# Patient Record
Sex: MALE | Race: BLACK OR AFRICAN AMERICAN | NOT HISPANIC OR LATINO | Employment: UNEMPLOYED | URBAN - NONMETROPOLITAN AREA
[De-identification: names, ages, dates, MRNs, and addresses within clinical notes are randomized per-mention and may not be internally consistent; named-entity substitution may affect disease eponyms.]

---

## 2017-07-17 ENCOUNTER — APPOINTMENT (OUTPATIENT)
Dept: CT IMAGING | Facility: HOSPITAL | Age: 42
End: 2017-07-17

## 2017-07-17 ENCOUNTER — HOSPITAL ENCOUNTER (EMERGENCY)
Facility: HOSPITAL | Age: 42
Discharge: HOME | End: 2017-07-17
Attending: EMERGENCY MEDICINE | Admitting: EMERGENCY MEDICINE

## 2017-07-17 VITALS
WEIGHT: 285 LBS | OXYGEN SATURATION: 100 % | TEMPERATURE: 97.4 F | HEIGHT: 74 IN | SYSTOLIC BLOOD PRESSURE: 127 MMHG | DIASTOLIC BLOOD PRESSURE: 89 MMHG | HEART RATE: 88 BPM | RESPIRATION RATE: 20 BRPM | BODY MASS INDEX: 36.57 KG/M2

## 2017-07-17 DIAGNOSIS — F10.920 ALCOHOL INTOXICATION, UNCOMPLICATED (HCC): Primary | ICD-10-CM

## 2017-07-17 LAB
ALBUMIN SERPL-MCNC: 4.6 G/DL (ref 3.5–5)
ALBUMIN/GLOB SERPL: 1.4 G/DL (ref 1–2)
ALP SERPL-CCNC: 81 U/L (ref 38–126)
ALT SERPL W P-5'-P-CCNC: 29 U/L (ref 13–69)
AMPHET+METHAMPHET UR QL: NEGATIVE
AMPHETAMINES UR QL: NEGATIVE
ANION GAP SERPL CALCULATED.3IONS-SCNC: 19.3 MMOL/L
AST SERPL-CCNC: 34 U/L (ref 15–46)
BACTERIA UR QL AUTO: ABNORMAL /HPF
BARBITURATES UR QL SCN: NEGATIVE
BASOPHILS # BLD AUTO: 0.09 10*3/MM3 (ref 0–0.2)
BASOPHILS NFR BLD AUTO: 1.8 % (ref 0–2.5)
BENZODIAZ UR QL SCN: NEGATIVE
BILIRUB SERPL-MCNC: 0.8 MG/DL (ref 0.2–1.3)
BILIRUB UR QL STRIP: NEGATIVE
BUN BLD-MCNC: 17 MG/DL (ref 7–20)
BUN/CREAT SERPL: 15.5 (ref 6.3–21.9)
BUPRENORPHINE SERPL-MCNC: NEGATIVE NG/ML
CALCIUM SPEC-SCNC: 9.3 MG/DL (ref 8.4–10.2)
CANNABINOIDS SERPL QL: NEGATIVE
CHLORIDE SERPL-SCNC: 108 MMOL/L (ref 98–107)
CLARITY UR: CLEAR
CO2 SERPL-SCNC: 25 MMOL/L (ref 26–30)
COCAINE UR QL: NEGATIVE
COLOR UR: YELLOW
CREAT BLD-MCNC: 1.1 MG/DL (ref 0.6–1.3)
DEPRECATED RDW RBC AUTO: 45.2 FL (ref 37–54)
EOSINOPHIL # BLD AUTO: 0.1 10*3/MM3 (ref 0–0.7)
EOSINOPHIL NFR BLD AUTO: 2 % (ref 0–7)
ERYTHROCYTE [DISTWIDTH] IN BLOOD BY AUTOMATED COUNT: 15.1 % (ref 11.5–14.5)
ETHANOL BLD-MCNC: 321 MG/DL
ETHANOL UR QL: 0.32 %
GFR SERPL CREATININE-BSD FRML MDRD: 89 ML/MIN/1.73
GLOBULIN UR ELPH-MCNC: 3.2 GM/DL
GLUCOSE BLD-MCNC: 111 MG/DL (ref 74–98)
GLUCOSE UR STRIP-MCNC: NEGATIVE MG/DL
HCT VFR BLD AUTO: 44.8 % (ref 42–52)
HGB BLD-MCNC: 14.6 G/DL (ref 14–18)
HGB UR QL STRIP.AUTO: NEGATIVE
HYALINE CASTS UR QL AUTO: ABNORMAL /LPF
IMM GRANULOCYTES # BLD: 0.01 10*3/MM3 (ref 0–0.06)
IMM GRANULOCYTES NFR BLD: 0.2 % (ref 0–0.6)
KETONES UR QL STRIP: NEGATIVE
LEUKOCYTE ESTERASE UR QL STRIP.AUTO: ABNORMAL
LYMPHOCYTES # BLD AUTO: 2.45 10*3/MM3 (ref 0.6–3.4)
LYMPHOCYTES NFR BLD AUTO: 48.8 % (ref 10–50)
MCH RBC QN AUTO: 27.3 PG (ref 27–31)
MCHC RBC AUTO-ENTMCNC: 32.6 G/DL (ref 30–37)
MCV RBC AUTO: 83.9 FL (ref 80–94)
METHADONE UR QL SCN: NEGATIVE
MONOCYTES # BLD AUTO: 0.47 10*3/MM3 (ref 0–0.9)
MONOCYTES NFR BLD AUTO: 9.4 % (ref 0–12)
NEUTROPHILS # BLD AUTO: 1.9 10*3/MM3 (ref 2–6.9)
NEUTROPHILS NFR BLD AUTO: 37.8 % (ref 37–80)
NITRITE UR QL STRIP: NEGATIVE
NRBC BLD MANUAL-RTO: 0 /100 WBC (ref 0–0)
OPIATES UR QL: NEGATIVE
OXYCODONE UR QL SCN: NEGATIVE
PCP UR QL SCN: NEGATIVE
PH UR STRIP.AUTO: 5.5 [PH] (ref 5–8)
PLATELET # BLD AUTO: 275 10*3/MM3 (ref 130–400)
PMV BLD AUTO: 10.4 FL (ref 6–12)
POTASSIUM BLD-SCNC: 4.3 MMOL/L (ref 3.5–5.1)
PROPOXYPH UR QL: NEGATIVE
PROT SERPL-MCNC: 7.8 G/DL (ref 6.3–8.2)
PROT UR QL STRIP: ABNORMAL
RBC # BLD AUTO: 5.34 10*6/MM3 (ref 4.7–6.1)
RBC # UR: ABNORMAL /HPF
REF LAB TEST METHOD: ABNORMAL
SODIUM BLD-SCNC: 148 MMOL/L (ref 137–145)
SP GR UR STRIP: 1.02 (ref 1–1.03)
SQUAMOUS #/AREA URNS HPF: ABNORMAL /HPF
TRICYCLICS UR QL SCN: NEGATIVE
TROPONIN I SERPL-MCNC: <0.012 NG/ML (ref 0–0.03)
UROBILINOGEN UR QL STRIP: ABNORMAL
WBC NRBC COR # BLD: 5.02 10*3/MM3 (ref 4.8–10.8)
WBC UR QL AUTO: ABNORMAL /HPF

## 2017-07-17 PROCEDURE — 84484 ASSAY OF TROPONIN QUANT: CPT | Performed by: PHYSICIAN ASSISTANT

## 2017-07-17 PROCEDURE — 70450 CT HEAD/BRAIN W/O DYE: CPT

## 2017-07-17 PROCEDURE — 85025 COMPLETE CBC W/AUTO DIFF WBC: CPT | Performed by: PHYSICIAN ASSISTANT

## 2017-07-17 PROCEDURE — 80307 DRUG TEST PRSMV CHEM ANLYZR: CPT | Performed by: PHYSICIAN ASSISTANT

## 2017-07-17 PROCEDURE — 96366 THER/PROPH/DIAG IV INF ADDON: CPT

## 2017-07-17 PROCEDURE — 80306 DRUG TEST PRSMV INSTRMNT: CPT | Performed by: PHYSICIAN ASSISTANT

## 2017-07-17 PROCEDURE — 96375 TX/PRO/DX INJ NEW DRUG ADDON: CPT

## 2017-07-17 PROCEDURE — 25010000002 THIAMINE PER 100 MG: Performed by: PHYSICIAN ASSISTANT

## 2017-07-17 PROCEDURE — 96361 HYDRATE IV INFUSION ADD-ON: CPT

## 2017-07-17 PROCEDURE — 25010000002 MAGNESIUM SULFATE PER 500 MG OF MAGNESIUM: Performed by: PHYSICIAN ASSISTANT

## 2017-07-17 PROCEDURE — 81001 URINALYSIS AUTO W/SCOPE: CPT | Performed by: PHYSICIAN ASSISTANT

## 2017-07-17 PROCEDURE — 80053 COMPREHEN METABOLIC PANEL: CPT | Performed by: PHYSICIAN ASSISTANT

## 2017-07-17 PROCEDURE — 93005 ELECTROCARDIOGRAM TRACING: CPT | Performed by: PHYSICIAN ASSISTANT

## 2017-07-17 PROCEDURE — 96365 THER/PROPH/DIAG IV INF INIT: CPT

## 2017-07-17 PROCEDURE — 99284 EMERGENCY DEPT VISIT MOD MDM: CPT

## 2017-07-17 PROCEDURE — 25010000002 DIAZEPAM PER 5 MG: Performed by: EMERGENCY MEDICINE

## 2017-07-17 RX ORDER — MELOXICAM 7.5 MG/1
7.5 TABLET ORAL 2 TIMES DAILY
COMMUNITY

## 2017-07-17 RX ORDER — HYDROCHLOROTHIAZIDE 25 MG/1
25 TABLET ORAL DAILY
COMMUNITY

## 2017-07-17 RX ORDER — LANOLIN ALCOHOL/MO/W.PET/CERES
1000 CREAM (GRAM) TOPICAL DAILY
COMMUNITY

## 2017-07-17 RX ORDER — HYDROXYZINE PAMOATE 50 MG/1
50 CAPSULE ORAL AS NEEDED
COMMUNITY

## 2017-07-17 RX ORDER — FOLIC ACID 1 MG/1
1 TABLET ORAL DAILY
COMMUNITY

## 2017-07-17 RX ORDER — DIAZEPAM 5 MG/ML
5 INJECTION, SOLUTION INTRAMUSCULAR; INTRAVENOUS ONCE
Status: COMPLETED | OUTPATIENT
Start: 2017-07-17 | End: 2017-07-17

## 2017-07-17 RX ORDER — CLONIDINE HYDROCHLORIDE 0.1 MG/1
0.1 TABLET ORAL 2 TIMES DAILY
COMMUNITY

## 2017-07-17 RX ORDER — METHOCARBAMOL 750 MG/1
750 TABLET, FILM COATED ORAL 2 TIMES DAILY
COMMUNITY

## 2017-07-17 RX ORDER — OMEPRAZOLE 40 MG/1
40 CAPSULE, DELAYED RELEASE ORAL DAILY
COMMUNITY

## 2017-07-17 RX ADMIN — DIAZEPAM 5 MG: 5 INJECTION, SOLUTION INTRAMUSCULAR; INTRAVENOUS at 20:07

## 2017-07-17 RX ADMIN — SODIUM CHLORIDE 1000 ML: 9 INJECTION, SOLUTION INTRAVENOUS at 19:28

## 2017-07-17 RX ADMIN — FOLIC ACID 1000 ML/HR: 5 INJECTION, SOLUTION INTRAMUSCULAR; INTRAVENOUS; SUBCUTANEOUS at 20:03

## 2017-07-17 NOTE — ED PROVIDER NOTES
"Subjective   HPI Comments: 42-year-old male presents for altered mental status, he reports that he drinks daily and had \"a few drinks\" today he also worked in a hot trailer all day and did not have much water.  EMS was called because his girlfriend states that he passed out when he got shower and was confused and altered.  He said slurred speech and he smells of alcohol, she states that he is an alcoholic and withdrawal symptoms he doesn't drink.  He denies any chest pain or shortness of breath at this time and his headache is improved and he is less confused    Patient is a 42 y.o. male presenting with altered mental status.   History provided by:  Patient   used: No    Altered Mental Status   Presenting symptoms: behavior changes, disorientation and lethargy    Severity:  Mild  Most recent episode:  Today  Episode history:  Single  Duration:  30 minutes  Timing:  Intermittent  Progression:  Improving  Chronicity:  New  Context: alcohol use    Associated symptoms: headaches, slurred speech and weakness        Review of Systems   Neurological: Positive for weakness and headaches.        Altered mental status   All other systems reviewed and are negative.      Past Medical History:   Diagnosis Date   • Anxiety    • Arthritis    • Asthma    • COPD (chronic obstructive pulmonary disease)    • GERD (gastroesophageal reflux disease)    • H/O chest tube placement    • Hypertension    • Seizures     WITHDRAWAL       Allergies   Allergen Reactions   • Penicillins Rash       Past Surgical History:   Procedure Laterality Date   • KNEE ARTHROSCOPY Bilateral    • WRIST ARTHROPLASTY Left        History reviewed. No pertinent family history.    Social History     Social History   • Marital status: Single     Spouse name: N/A   • Number of children: N/A   • Years of education: N/A     Social History Main Topics   • Smoking status: Current Every Day Smoker     Packs/day: 0.50     Types: Cigarettes   • Smokeless " tobacco: None   • Alcohol use Yes      Comment: 1/2 [place] DAILY   • Drug use: No   • Sexual activity: Defer     Other Topics Concern   • None     Social History Narrative   • None           Objective   Physical Exam   Constitutional: He is oriented to person, place, and time. He appears well-developed and well-nourished.   HENT:   Head: Normocephalic and atraumatic.   Left Ear: External ear normal.   Eyes: EOM are normal. Pupils are equal, round, and reactive to light.   Neck: Normal range of motion.   Cardiovascular: Normal rate and regular rhythm.    Pulmonary/Chest: Effort normal and breath sounds normal.   Abdominal: Soft. Bowel sounds are normal.   Musculoskeletal: Normal range of motion.   Neurological: He is alert and oriented to person, place, and time.   Slow speech, ataxia   Skin: Skin is warm and dry.   Psychiatric:   Depressed affect   Nursing note and vitals reviewed.      Procedures         ED Course  ED Course                  MDM    Final diagnoses:   Alcohol intoxication, uncomplicated            Angel Campos Jr., PATIFFANIE  07/17/17 2730

## 2019-08-07 ENCOUNTER — APPOINTMENT (OUTPATIENT)
Dept: GENERAL RADIOLOGY | Facility: HOSPITAL | Age: 44
End: 2019-08-07

## 2019-08-07 ENCOUNTER — HOSPITAL ENCOUNTER (EMERGENCY)
Facility: HOSPITAL | Age: 44
Discharge: HOME | End: 2019-08-08
Attending: STUDENT IN AN ORGANIZED HEALTH CARE EDUCATION/TRAINING PROGRAM | Admitting: STUDENT IN AN ORGANIZED HEALTH CARE EDUCATION/TRAINING PROGRAM

## 2019-08-07 DIAGNOSIS — R07.9 CHEST PAIN, UNSPECIFIED TYPE: Primary | ICD-10-CM

## 2019-08-07 LAB
ALBUMIN SERPL-MCNC: 4.4 G/DL (ref 3.5–5.2)
ALBUMIN/GLOB SERPL: 1.3 G/DL
ALP SERPL-CCNC: 59 U/L (ref 39–117)
ALT SERPL W P-5'-P-CCNC: 13 U/L (ref 1–41)
ANION GAP SERPL CALCULATED.3IONS-SCNC: 18 MMOL/L (ref 5–15)
AST SERPL-CCNC: 19 U/L (ref 1–40)
BASOPHILS # BLD AUTO: 0.08 10*3/MM3 (ref 0–0.2)
BASOPHILS NFR BLD AUTO: 1.6 % (ref 0–1.5)
BILIRUB SERPL-MCNC: 0.3 MG/DL (ref 0.2–1.2)
BUN BLD-MCNC: 11 MG/DL (ref 6–20)
BUN/CREAT SERPL: 10.3 (ref 7–25)
CALCIUM SPEC-SCNC: 9.6 MG/DL (ref 8.6–10.5)
CHLORIDE SERPL-SCNC: 100 MMOL/L (ref 98–107)
CO2 SERPL-SCNC: 23.9 MMOL/L (ref 22–29)
CREAT BLD-MCNC: 1.07 MG/DL (ref 0.76–1.27)
DEPRECATED RDW RBC AUTO: 44.5 FL (ref 37–54)
EOSINOPHIL # BLD AUTO: 0.27 10*3/MM3 (ref 0–0.4)
EOSINOPHIL NFR BLD AUTO: 5.4 % (ref 0.3–6.2)
ERYTHROCYTE [DISTWIDTH] IN BLOOD BY AUTOMATED COUNT: 14.7 % (ref 12.3–15.4)
GFR SERPL CREATININE-BSD FRML MDRD: 91 ML/MIN/1.73
GLOBULIN UR ELPH-MCNC: 3.5 GM/DL
GLUCOSE BLD-MCNC: 114 MG/DL (ref 65–99)
HCT VFR BLD AUTO: 47.4 % (ref 37.5–51)
HGB BLD-MCNC: 15.1 G/DL (ref 13–17.7)
IMM GRANULOCYTES # BLD AUTO: 0.01 10*3/MM3 (ref 0–0.05)
IMM GRANULOCYTES NFR BLD AUTO: 0.2 % (ref 0–0.5)
LYMPHOCYTES # BLD AUTO: 1.91 10*3/MM3 (ref 0.7–3.1)
LYMPHOCYTES NFR BLD AUTO: 38.3 % (ref 19.6–45.3)
MCH RBC QN AUTO: 26.4 PG (ref 26.6–33)
MCHC RBC AUTO-ENTMCNC: 31.9 G/DL (ref 31.5–35.7)
MCV RBC AUTO: 82.9 FL (ref 79–97)
MONOCYTES # BLD AUTO: 0.41 10*3/MM3 (ref 0.1–0.9)
MONOCYTES NFR BLD AUTO: 8.2 % (ref 5–12)
NEUTROPHILS # BLD AUTO: 2.31 10*3/MM3 (ref 1.7–7)
NEUTROPHILS NFR BLD AUTO: 46.3 % (ref 42.7–76)
NRBC BLD AUTO-RTO: 0 /100 WBC (ref 0–0.2)
PLATELET # BLD AUTO: 315 10*3/MM3 (ref 140–450)
PMV BLD AUTO: 9.9 FL (ref 6–12)
POTASSIUM BLD-SCNC: 3.9 MMOL/L (ref 3.5–5.2)
PROT SERPL-MCNC: 7.9 G/DL (ref 6–8.5)
RBC # BLD AUTO: 5.72 10*6/MM3 (ref 4.14–5.8)
SODIUM BLD-SCNC: 138 MMOL/L (ref 136–145)
TROPONIN I SERPL-MCNC: 0.02 NG/ML (ref 0–0.05)
TROPONIN T SERPL-MCNC: <0.01 NG/ML (ref 0–0.03)
WBC NRBC COR # BLD: 4.99 10*3/MM3 (ref 3.4–10.8)

## 2019-08-07 PROCEDURE — 71045 X-RAY EXAM CHEST 1 VIEW: CPT

## 2019-08-07 PROCEDURE — 93005 ELECTROCARDIOGRAM TRACING: CPT | Performed by: STUDENT IN AN ORGANIZED HEALTH CARE EDUCATION/TRAINING PROGRAM

## 2019-08-07 PROCEDURE — 84484 ASSAY OF TROPONIN QUANT: CPT | Performed by: STUDENT IN AN ORGANIZED HEALTH CARE EDUCATION/TRAINING PROGRAM

## 2019-08-07 PROCEDURE — 85025 COMPLETE CBC W/AUTO DIFF WBC: CPT | Performed by: STUDENT IN AN ORGANIZED HEALTH CARE EDUCATION/TRAINING PROGRAM

## 2019-08-07 PROCEDURE — 80053 COMPREHEN METABOLIC PANEL: CPT | Performed by: STUDENT IN AN ORGANIZED HEALTH CARE EDUCATION/TRAINING PROGRAM

## 2019-08-07 PROCEDURE — 99284 EMERGENCY DEPT VISIT MOD MDM: CPT

## 2019-08-07 RX ORDER — SODIUM CHLORIDE 0.9 % (FLUSH) 0.9 %
10 SYRINGE (ML) INJECTION AS NEEDED
Status: DISCONTINUED | OUTPATIENT
Start: 2019-08-07 | End: 2019-08-08 | Stop reason: HOSPADM

## 2019-08-08 VITALS
DIASTOLIC BLOOD PRESSURE: 93 MMHG | HEIGHT: 74 IN | RESPIRATION RATE: 18 BRPM | TEMPERATURE: 98 F | BODY MASS INDEX: 40.43 KG/M2 | WEIGHT: 315 LBS | SYSTOLIC BLOOD PRESSURE: 136 MMHG | OXYGEN SATURATION: 99 % | HEART RATE: 78 BPM

## 2019-08-08 LAB
HOLD SPECIMEN: NORMAL
HOLD SPECIMEN: NORMAL
TROPONIN T SERPL-MCNC: <0.01 NG/ML (ref 0–0.03)
WHOLE BLOOD HOLD SPECIMEN: NORMAL
WHOLE BLOOD HOLD SPECIMEN: NORMAL

## 2019-08-08 PROCEDURE — 84484 ASSAY OF TROPONIN QUANT: CPT | Performed by: PHYSICIAN ASSISTANT

## 2019-08-08 NOTE — ED PROVIDER NOTES
Subjective   Patient is a 44-year-old male with history of anxiety, asthma, COPD, GERD, and hypertension presenting to the ER via EMS for evaluation of chest pain.  Patient is a current everyday smoker.  He states proximal 1 hour prior to arrival he was at his home moving car seats from a car when he began feeling a midsternal chest pain he describes sharp in nature that radiated toward his left shoulder.  He states that lasted approximately 5 to 6 minutes and resolved.  He states he felt a bit short of breath diaphoretic at that time.  He denies any known personal cardiac history.  Says he had another episode of pain when EMS got there that did not last as long or feel as severe.  He has not had any chest pain since that time.  He states he is felt intermittently dizzy at times but no current dizziness.  He denies any recent headaches, fever, chills, productive cough, difficulty breathing, syncope, abdominal pain, emesis, diarrhea, leg pain or swelling, or any other symptoms.  He states he had a stress test years ago.  He was given aspirin in route by EMS.            Review of Systems   All other systems reviewed and are negative.      Past Medical History:   Diagnosis Date   • Anxiety    • Arthritis    • Asthma    • COPD (chronic obstructive pulmonary disease) (CMS/HCC)    • GERD (gastroesophageal reflux disease)    • H/O chest tube placement    • Hypertension    • Seizures (CMS/[facility])     WITHDRAWAL       Allergies   Allergen Reactions   • Penicillins Rash       Past Surgical History:   Procedure Laterality Date   • KNEE ARTHROSCOPY Bilateral    • WRIST ARTHROPLASTY Left        History reviewed. No pertinent family history.    Social History     Socioeconomic History   • Marital status: Single     Spouse name: Not on file   • Number of children: Not on file   • Years of education: Not on file   • Highest education level: Not on file   Tobacco Use   • Smoking status: Current Every Day Smoker     Packs/day: 0.50      Types: Cigarettes   Substance and Sexual Activity   • Alcohol use: Yes     Alcohol/week: 4.2 oz     Types: 7 Cans of beer per week   • Drug use: No   • Sexual activity: Defer           Objective   Physical Exam   Nursing note and vitals reviewed.    GEN: No acute distress, sitting upright in stretcher.  He is awake and alert.  He is obese.  He does not appear toxic  Head: Normocephalic, atraumatic  Eyes: Pupils equal round reactive to light, EOM intact  ENT: Posterior pharynx normal in appearance, oral mucosa is moist, tongue midline   Chest: Nontender to palpation  Cardiovascular: Regular rate and rhythm  Lungs: Breathing is even and nonlabored, no wheezing or adventitious sounds appreciated  Abdomen: Soft, nontender, nondistended, no peritoneal signs  Extremities: No edema, normal appearance, full ROM without deficits.  Radial and dorsalis pedis pulses are 2+ and equal  Neuro: GCS 15  Psych: Mood and affect are appropriate    Procedures           ED Course  ED Course as of Aug 08 0201   Wed Aug 07, 2019   2336 EKG shows sinus rhythm with a rate of 78.  No significant ST segments.  Normal EKG.  Interpreted by me.  [DT]   2344 Troponin I: 0.020 [LA]   2355 Glucose: (!) 114 [LA]   2355 Creatinine: 1.07 [LA]   2355 Sodium: 138 [LA]   2355 Potassium: 3.9 [LA]   2355 Chloride: 100 [LA]   2355 Calcium: 9.6 [LA]   2355 ALT (SGPT): 13 [LA]   2355 AST (SGOT): 19 [LA]   2355 Alkaline Phosphatase: 59 [LA]   2355 Total Bilirubin: 0.3 [LA]   Thu Aug 08, 2019   0010 Troponin T: <0.010 [LA]   0024 X-ray reviewed with Dr. Carpenter and we do not see any acute cardiopulmonary abnormalities.  [LA]   0101 Lab work stable.  Initial troponin negative.  We are awaiting a repeat troponin.  [LA]   0104 On reassessment, patient is resting comfortably in stretcher, denies any recurrence of chest pain.  His vital signs are stable.  Discussed all the findings with patient as well as follow-up.  [LA]   0200 Second troponin has returned to normal.   Patient will need prompt follow-up for further evaluation.  Did discuss this with patient.  He does voice understanding.  [DT]      ED Course User Index  [DT] Tani Carpenter MD  [LA] Reema Alexis PA-C                  MDM  Number of Diagnoses or Management Options  Diagnosis management comments: On arrival, patient is hypertensive but afebrile, no acute distress, nontoxic in appearance.  He is not hypoxic.  Differential includes cardiac arrhythmia, ACS, electrolyte abnormalities, pneumonia, bronchitis, COPD exacerbation, cardiomegaly, pleural effusion, pneumothorax, and other concerns.  There is lower concern for aortic dissection, pulmonary embolism. He is PERC negative.  Will obtain basic labs including troponin, EKG, chest x-ray.     Initial EKG without signs of ischemia.  Lab work stable.  Initial troponin negative.  Patient is resting comfortably in the stretcher.  Chest x-ray without acute abnormalities.  We are awaiting second troponin and if this is negative patient can be discharged home with cardiology follow-up and strict return precautions.  Patient care was handed to Dr. Carpenter at 0100 awaiting repeat troponin.         Amount and/or Complexity of Data Reviewed  Clinical lab tests: reviewed and ordered  Tests in the radiology section of CPT®: reviewed and ordered  Discussion of test results with the performing providers: yes  Review and summarize past medical records: yes  Discuss the patient with other providers: yes  Independent visualization of images, tracings, or specimens: yes    Risk of Complications, Morbidity, and/or Mortality  Presenting problems: high  Diagnostic procedures: moderate  Management options: low          Final diagnoses:   Chest pain, unspecified type            Tani Carpenter MD  08/08/19 020

## 2020-06-10 ENCOUNTER — HOSPITAL ENCOUNTER (EMERGENCY)
Facility: HOSPITAL | Age: 45
Discharge: HOME | End: 2020-06-10
Attending: EMERGENCY MEDICINE | Admitting: EMERGENCY MEDICINE

## 2020-06-10 ENCOUNTER — APPOINTMENT (OUTPATIENT)
Dept: CT IMAGING | Facility: HOSPITAL | Age: 45
End: 2020-06-10

## 2020-06-10 ENCOUNTER — APPOINTMENT (OUTPATIENT)
Dept: GENERAL RADIOLOGY | Facility: HOSPITAL | Age: 45
End: 2020-06-10

## 2020-06-10 VITALS
TEMPERATURE: 97.3 F | SYSTOLIC BLOOD PRESSURE: 122 MMHG | DIASTOLIC BLOOD PRESSURE: 66 MMHG | BODY MASS INDEX: 40.43 KG/M2 | RESPIRATION RATE: 18 BRPM | WEIGHT: 315 LBS | OXYGEN SATURATION: 94 % | HEART RATE: 94 BPM | HEIGHT: 74 IN

## 2020-06-10 DIAGNOSIS — R10.33 PERIUMBILICAL ABDOMINAL PAIN: Primary | ICD-10-CM

## 2020-06-10 DIAGNOSIS — F10.920 ALCOHOLIC INTOXICATION WITHOUT COMPLICATION (HCC): ICD-10-CM

## 2020-06-10 LAB
ALBUMIN SERPL-MCNC: 4.3 G/DL (ref 3.5–5.2)
ALBUMIN/GLOB SERPL: 1.3 G/DL
ALP SERPL-CCNC: 92 U/L (ref 39–117)
ALT SERPL W P-5'-P-CCNC: 20 U/L (ref 1–41)
AMPHET+METHAMPHET UR QL: NEGATIVE
AMPHETAMINES UR QL: NEGATIVE
ANION GAP SERPL CALCULATED.3IONS-SCNC: 16 MMOL/L (ref 5–15)
APAP SERPL-MCNC: <5 MCG/ML (ref 10–30)
AST SERPL-CCNC: 40 U/L (ref 1–40)
BARBITURATES UR QL SCN: NEGATIVE
BASOPHILS # BLD AUTO: 0.11 10*3/MM3 (ref 0–0.2)
BASOPHILS NFR BLD AUTO: 2.1 % (ref 0–1.5)
BENZODIAZ UR QL SCN: NEGATIVE
BILIRUB SERPL-MCNC: 0.3 MG/DL (ref 0.2–1.2)
BILIRUB UR QL STRIP: NEGATIVE
BUN BLD-MCNC: 16 MG/DL (ref 6–20)
BUN/CREAT SERPL: 14.5 (ref 7–25)
BUPRENORPHINE SERPL-MCNC: NEGATIVE NG/ML
CALCIUM SPEC-SCNC: 8.6 MG/DL (ref 8.6–10.5)
CANNABINOIDS SERPL QL: NEGATIVE
CHLORIDE SERPL-SCNC: 103 MMOL/L (ref 98–107)
CLARITY UR: CLEAR
CO2 SERPL-SCNC: 22 MMOL/L (ref 22–29)
COCAINE UR QL: NEGATIVE
COLOR UR: YELLOW
CREAT BLD-MCNC: 1.1 MG/DL (ref 0.76–1.27)
DEPRECATED RDW RBC AUTO: 44.1 FL (ref 37–54)
EOSINOPHIL # BLD AUTO: 0.12 10*3/MM3 (ref 0–0.4)
EOSINOPHIL NFR BLD AUTO: 2.3 % (ref 0.3–6.2)
ERYTHROCYTE [DISTWIDTH] IN BLOOD BY AUTOMATED COUNT: 14.5 % (ref 12.3–15.4)
ETHANOL BLD-MCNC: 247 MG/DL (ref 0–10)
GFR SERPL CREATININE-BSD FRML MDRD: 88 ML/MIN/1.73
GLOBULIN UR ELPH-MCNC: 3.2 GM/DL
GLUCOSE BLD-MCNC: 95 MG/DL (ref 65–99)
GLUCOSE UR STRIP-MCNC: NEGATIVE MG/DL
HCT VFR BLD AUTO: 48.2 % (ref 37.5–51)
HGB BLD-MCNC: 15.5 G/DL (ref 13–17.7)
HGB UR QL STRIP.AUTO: NEGATIVE
HOLD SPECIMEN: NORMAL
HOLD SPECIMEN: NORMAL
IMM GRANULOCYTES # BLD AUTO: 0.01 10*3/MM3 (ref 0–0.05)
IMM GRANULOCYTES NFR BLD AUTO: 0.2 % (ref 0–0.5)
KETONES UR QL STRIP: ABNORMAL
LEUKOCYTE ESTERASE UR QL STRIP.AUTO: NEGATIVE
LIPASE SERPL-CCNC: 51 U/L (ref 13–60)
LYMPHOCYTES # BLD AUTO: 2.62 10*3/MM3 (ref 0.7–3.1)
LYMPHOCYTES NFR BLD AUTO: 49.6 % (ref 19.6–45.3)
MAGNESIUM SERPL-MCNC: 2.2 MG/DL (ref 1.6–2.6)
MCH RBC QN AUTO: 27.2 PG (ref 26.6–33)
MCHC RBC AUTO-ENTMCNC: 32.2 G/DL (ref 31.5–35.7)
MCV RBC AUTO: 84.6 FL (ref 79–97)
METHADONE UR QL SCN: NEGATIVE
MONOCYTES # BLD AUTO: 0.41 10*3/MM3 (ref 0.1–0.9)
MONOCYTES NFR BLD AUTO: 7.8 % (ref 5–12)
NEUTROPHILS # BLD AUTO: 2.01 10*3/MM3 (ref 1.7–7)
NEUTROPHILS NFR BLD AUTO: 38 % (ref 42.7–76)
NITRITE UR QL STRIP: NEGATIVE
NRBC BLD AUTO-RTO: 0 /100 WBC (ref 0–0.2)
OPIATES UR QL: NEGATIVE
OXYCODONE UR QL SCN: NEGATIVE
PCP UR QL SCN: NEGATIVE
PH UR STRIP.AUTO: <=5 [PH] (ref 5–8)
PLATELET # BLD AUTO: 382 10*3/MM3 (ref 140–450)
PMV BLD AUTO: 9.9 FL (ref 6–12)
POTASSIUM BLD-SCNC: 4.1 MMOL/L (ref 3.5–5.2)
PROPOXYPH UR QL: NEGATIVE
PROT SERPL-MCNC: 7.5 G/DL (ref 6–8.5)
PROT UR QL STRIP: ABNORMAL
RBC # BLD AUTO: 5.7 10*6/MM3 (ref 4.14–5.8)
SALICYLATES SERPL-MCNC: <0.3 MG/DL
SODIUM BLD-SCNC: 141 MMOL/L (ref 136–145)
SP GR UR STRIP: 1.03 (ref 1–1.03)
TRICYCLICS UR QL SCN: NEGATIVE
TROPONIN T SERPL-MCNC: <0.01 NG/ML (ref 0–0.03)
UROBILINOGEN UR QL STRIP: ABNORMAL
WBC NRBC COR # BLD: 5.28 10*3/MM3 (ref 3.4–10.8)
WHOLE BLOOD HOLD SPECIMEN: NORMAL
WHOLE BLOOD HOLD SPECIMEN: NORMAL

## 2020-06-10 PROCEDURE — 96374 THER/PROPH/DIAG INJ IV PUSH: CPT

## 2020-06-10 PROCEDURE — 80307 DRUG TEST PRSMV CHEM ANLYZR: CPT | Performed by: EMERGENCY MEDICINE

## 2020-06-10 PROCEDURE — 99284 EMERGENCY DEPT VISIT MOD MDM: CPT

## 2020-06-10 PROCEDURE — 93005 ELECTROCARDIOGRAM TRACING: CPT | Performed by: EMERGENCY MEDICINE

## 2020-06-10 PROCEDURE — 84484 ASSAY OF TROPONIN QUANT: CPT | Performed by: EMERGENCY MEDICINE

## 2020-06-10 PROCEDURE — 83735 ASSAY OF MAGNESIUM: CPT | Performed by: EMERGENCY MEDICINE

## 2020-06-10 PROCEDURE — 80053 COMPREHEN METABOLIC PANEL: CPT | Performed by: EMERGENCY MEDICINE

## 2020-06-10 PROCEDURE — 71045 X-RAY EXAM CHEST 1 VIEW: CPT

## 2020-06-10 PROCEDURE — 25010000002 ONDANSETRON PER 1 MG: Performed by: EMERGENCY MEDICINE

## 2020-06-10 PROCEDURE — 85025 COMPLETE CBC W/AUTO DIFF WBC: CPT | Performed by: EMERGENCY MEDICINE

## 2020-06-10 PROCEDURE — 25010000002 IOPAMIDOL 61 % SOLUTION: Performed by: EMERGENCY MEDICINE

## 2020-06-10 PROCEDURE — 81003 URINALYSIS AUTO W/O SCOPE: CPT | Performed by: EMERGENCY MEDICINE

## 2020-06-10 PROCEDURE — 25010000002 HYDROMORPHONE 1 MG/ML SOLUTION: Performed by: EMERGENCY MEDICINE

## 2020-06-10 PROCEDURE — 96375 TX/PRO/DX INJ NEW DRUG ADDON: CPT

## 2020-06-10 PROCEDURE — 74177 CT ABD & PELVIS W/CONTRAST: CPT

## 2020-06-10 PROCEDURE — 83690 ASSAY OF LIPASE: CPT | Performed by: EMERGENCY MEDICINE

## 2020-06-10 RX ORDER — SODIUM CHLORIDE 0.9 % (FLUSH) 0.9 %
10 SYRINGE (ML) INJECTION AS NEEDED
Status: DISCONTINUED | OUTPATIENT
Start: 2020-06-10 | End: 2020-06-10 | Stop reason: HOSPADM

## 2020-06-10 RX ORDER — ONDANSETRON 2 MG/ML
4 INJECTION INTRAMUSCULAR; INTRAVENOUS ONCE
Status: COMPLETED | OUTPATIENT
Start: 2020-06-10 | End: 2020-06-10

## 2020-06-10 RX ORDER — ESCITALOPRAM OXALATE 20 MG/1
20 TABLET ORAL DAILY
COMMUNITY

## 2020-06-10 RX ORDER — AMANTADINE HYDROCHLORIDE 100 MG/1
100 CAPSULE, GELATIN COATED ORAL 2 TIMES DAILY
COMMUNITY

## 2020-06-10 RX ORDER — ONDANSETRON 4 MG/1
4 TABLET, FILM COATED ORAL EVERY 8 HOURS PRN
Qty: 15 TABLET | Refills: 0 | Status: SHIPPED | OUTPATIENT
Start: 2020-06-10

## 2020-06-10 RX ORDER — CELECOXIB 400 MG/1
400 CAPSULE ORAL 2 TIMES DAILY
Status: ON HOLD | COMMUNITY
End: 2021-02-11 | Stop reason: SDUPTHER

## 2020-06-10 RX ORDER — DICYCLOMINE HCL 20 MG
20 TABLET ORAL EVERY 8 HOURS PRN
Qty: 20 TABLET | Refills: 0 | Status: SHIPPED | OUTPATIENT
Start: 2020-06-10

## 2020-06-10 RX ADMIN — ONDANSETRON 4 MG: 2 INJECTION INTRAMUSCULAR; INTRAVENOUS at 00:50

## 2020-06-10 RX ADMIN — SODIUM CHLORIDE 1000 ML: 9 INJECTION, SOLUTION INTRAVENOUS at 00:50

## 2020-06-10 RX ADMIN — IOPAMIDOL 100 ML: 612 INJECTION, SOLUTION INTRAVENOUS at 01:27

## 2020-06-10 RX ADMIN — HYDROMORPHONE HYDROCHLORIDE 1 MG: 1 INJECTION, SOLUTION INTRAMUSCULAR; INTRAVENOUS; SUBCUTANEOUS at 00:50

## 2020-06-10 NOTE — ED PROVIDER NOTES
Subjective   45-year-old male presents for evaluation of abdominal pain.  Of note, the patient states that he has a prior history of pancreatitis.  He states that he has been on a drinking binge over the past 4 days and as result has been experiencing epigastric abdominal pain for the past 3 days.  He states that the pain is limited to his upper abdomen and is nonradiating in nature.  Pain is currently 10 out of 10 in severity.  He endorses accompanying nausea.  No diarrhea.  No sick contacts.  No recent foreign travel.  He denies any known exposures to anyone with a novel coronavirus.          Review of Systems   Constitutional: Negative for chills and fever.   Gastrointestinal: Positive for abdominal pain and nausea.   All other systems reviewed and are negative.      Past Medical History:   Diagnosis Date   • Anxiety    • Arthritis    • Asthma    • COPD (chronic obstructive pulmonary disease) (CMS/[facility])    • GERD (gastroesophageal reflux disease)    • H/O chest tube placement    • Hypertension    • Seizures (CMS/[facility])     WITHDRAWAL       Allergies   Allergen Reactions   • Penicillins Rash       Past Surgical History:   Procedure Laterality Date   • KNEE ARTHROSCOPY Bilateral    • WRIST ARTHROPLASTY Left        History reviewed. No pertinent family history.    Social History     Socioeconomic History   • Marital status: Single     Spouse name: Not on file   • Number of children: Not on file   • Years of education: Not on file   • Highest education level: Not on file   Tobacco Use   • Smoking status: Current Every Day Smoker     Packs/day: 1.00     Types: Cigarettes   • Smokeless tobacco: Never Used   Substance and Sexual Activity   • Alcohol use: Yes     Alcohol/week: 7.0 standard drinks     Types: 7 Cans of beer per week     Comment: pt has drank a gallon and a half of liquor for the last 4 days   • Drug use: No   • Sexual activity: Defer           Objective   Physical Exam   Constitutional: He is oriented to  person, place, and time. He appears well-developed and well-nourished. No distress.   Nontoxic-appearing male, obese   HENT:   Head: Normocephalic and atraumatic.   Mouth/Throat: Oropharynx is clear and moist.   Neck: Normal range of motion. No JVD present.   Cardiovascular: Normal rate, regular rhythm and normal heart sounds. Exam reveals no gallop and no friction rub.   No murmur heard.  Pulmonary/Chest: Effort normal and breath sounds normal. No respiratory distress. He has no wheezes. He has no rales.   Abdominal: Soft. Normal appearance and bowel sounds are normal. He exhibits no distension and no mass. There is tenderness. There is guarding.   Focal supraumbilical abdominal tenderness with voluntary guarding noted, no pain out of proportion to exam   Musculoskeletal: Normal range of motion.   Neurological: He is alert and oriented to person, place, and time.   Skin: Skin is warm and dry. No rash noted. He is not diaphoretic. No erythema. No pallor.   Psychiatric: He has a normal mood and affect. Judgment and thought content normal.   Nursing note and vitals reviewed.      Procedures           ED Course  ED Course as of Jeff 10 0527   Wed Jeff 10, 2020   0526 45-year-old male with a prior history of pancreatitis presents for evaluation of abdominal pain for the past 3 days after recent drinking binge.  On arrival to the ED, patient nontoxic-appearing.  Exam remarkable for supraumbilical abdominal tenderness with voluntary guarding noted.  No pain out of proportion to exam.  Labs remarkable for blood alcohol level of 247.  Advanced imaging unremarkable for pancreatitis or acute/emergent intra-abdominal process.  Upon reevaluation, patient felt improved.  Reassured and counseled regarding symptomatic management.  The patient was given resources to help him with his alcoholism.  He is tolerating p.o.  His pain is adequately controlled.  He will follow-up with his primary care physician within the next week.   Agreeable with plan and given appropriate strict return precautions.    [DD]      ED Course User Index  [DD] Tani Greer MD                                   Recent Results (from the past 24 hour(s))   Comprehensive Metabolic Panel    Collection Time: 06/10/20 12:53 AM   Result Value Ref Range    Glucose 95 65 - 99 mg/dL    BUN 16 6 - 20 mg/dL    Creatinine 1.10 0.76 - 1.27 mg/dL    Sodium 141 136 - 145 mmol/L    Potassium 4.1 3.5 - 5.2 mmol/L    Chloride 103 98 - 107 mmol/L    CO2 22.0 22.0 - 29.0 mmol/L    Calcium 8.6 8.6 - 10.5 mg/dL    Total Protein 7.5 6.0 - 8.5 g/dL    Albumin 4.30 3.50 - 5.20 g/dL    ALT (SGPT) 20 1 - 41 U/L    AST (SGOT) 40 1 - 40 U/L    Alkaline Phosphatase 92 39 - 117 U/L    Total Bilirubin 0.3 0.2 - 1.2 mg/dL    eGFR  African Amer 88 >60 mL/min/1.73    Globulin 3.2 gm/dL    A/G Ratio 1.3 g/dL    BUN/Creatinine Ratio 14.5 7.0 - 25.0    Anion Gap 16.0 (H) 5.0 - 15.0 mmol/L   Lipase    Collection Time: 06/10/20 12:53 AM   Result Value Ref Range    Lipase 51 13 - 60 U/L   Troponin    Collection Time: 06/10/20 12:53 AM   Result Value Ref Range    Troponin T <0.010 0.000 - 0.030 ng/mL   Light Blue Top    Collection Time: 06/10/20 12:53 AM   Result Value Ref Range    Extra Tube hold for add-on    Green Top (Gel)    Collection Time: 06/10/20 12:53 AM   Result Value Ref Range    Extra Tube Hold for add-ons.    Lavender Top    Collection Time: 06/10/20 12:53 AM   Result Value Ref Range    Extra Tube hold for add-on    Gold Top - SST    Collection Time: 06/10/20 12:53 AM   Result Value Ref Range    Extra Tube Hold for add-ons.    CBC Auto Differential    Collection Time: 06/10/20 12:53 AM   Result Value Ref Range    WBC 5.28 3.40 - 10.80 10*3/mm3    RBC 5.70 4.14 - 5.80 10*6/mm3    Hemoglobin 15.5 13.0 - 17.7 g/dL    Hematocrit 48.2 37.5 - 51.0 %    MCV 84.6 79.0 - 97.0 fL    MCH 27.2 26.6 - 33.0 pg    MCHC 32.2 31.5 - 35.7 g/dL    RDW 14.5 12.3 - 15.4 %    RDW-SD 44.1 37.0 - 54.0 fl    MPV  9.9 6.0 - 12.0 fL    Platelets 382 140 - 450 10*3/mm3    Neutrophil % 38.0 (L) 42.7 - 76.0 %    Lymphocyte % 49.6 (H) 19.6 - 45.3 %    Monocyte % 7.8 5.0 - 12.0 %    Eosinophil % 2.3 0.3 - 6.2 %    Basophil % 2.1 (H) 0.0 - 1.5 %    Immature Grans % 0.2 0.0 - 0.5 %    Neutrophils, Absolute 2.01 1.70 - 7.00 10*3/mm3    Lymphocytes, Absolute 2.62 0.70 - 3.10 10*3/mm3    Monocytes, Absolute 0.41 0.10 - 0.90 10*3/mm3    Eosinophils, Absolute 0.12 0.00 - 0.40 10*3/mm3    Basophils, Absolute 0.11 0.00 - 0.20 10*3/mm3    Immature Grans, Absolute 0.01 0.00 - 0.05 10*3/mm3    nRBC 0.0 0.0 - 0.2 /100 WBC   Magnesium    Collection Time: 06/10/20 12:53 AM   Result Value Ref Range    Magnesium 2.2 1.6 - 2.6 mg/dL   Salicylate Level    Collection Time: 06/10/20 12:53 AM   Result Value Ref Range    Salicylate <0.3 <=30.0 mg/dL   Ethanol    Collection Time: 06/10/20 12:53 AM   Result Value Ref Range    Ethanol 247 (H) 0 - 10 mg/dL   Acetaminophen Level    Collection Time: 06/10/20 12:53 AM   Result Value Ref Range    Acetaminophen <5.0 (L) 10.0 - 30.0 mcg/mL   Urinalysis With Microscopic If Indicated (No Culture) - Urine, Clean Catch    Collection Time: 06/10/20  1:00 AM   Result Value Ref Range    Color, UA Yellow Yellow, Straw    Appearance, UA Clear Clear    pH, UA <=5.0 5.0 - 8.0    Specific Gravity, UA 1.027 1.001 - 1.030    Glucose, UA Negative Negative    Ketones, UA Trace (A) Negative    Bilirubin, UA Negative Negative    Blood, UA Negative Negative    Protein, UA Trace (A) Negative    Leuk Esterase, UA Negative Negative    Nitrite, UA Negative Negative    Urobilinogen, UA 1.0 E.U./dL 0.2 - 1.0 E.U./dL   Urine Drug Screen - Urine, Clean Catch    Collection Time: 06/10/20  1:00 AM   Result Value Ref Range    THC, Screen, Urine Negative Negative    Phencyclidine (PCP), Urine Negative Negative    Cocaine Screen, Urine Negative Negative    Methamphetamine, Ur Negative Negative    Opiate Screen Negative Negative    Amphetamine  Screen, Urine Negative Negative    Benzodiazepine Screen, Urine Negative Negative    Tricyclic Antidepressants Screen Negative Negative    Methadone Screen, Urine Negative Negative    Barbiturates Screen, Urine Negative Negative    Oxycodone Screen, Urine Negative Negative    Propoxyphene Screen Negative Negative    Buprenorphine, Screen, Urine Negative Negative     Note: In addition to lab results from this visit, the labs listed above may include labs taken at another facility or during a different encounter within the last 24 hours. Please correlate lab times with ED admission and discharge times for further clarification of the services performed during this visit.    CT Abdomen Pelvis With Contrast   Final Result   1. No acute abnormality within the abdomen or pelvis.   2. Normal pancreas.   3. Hepatomegaly and diffuse hepatic steatosis. No splenomegaly or ascites.   4. Normal appendix.      Signer Name: Connor Clemente MD    Signed: 6/10/2020 1:47 AM    Workstation Name: [facility]     Radiology [facility]      XR Chest 1 View   Final Result   No active disease.      Signer Name: Connor Clemente MD    Signed: 6/10/2020 1:43 AM    Workstation Name: [facility]     Radiology [facility]        Vitals:    06/10/20 0045 06/10/20 0100 06/10/20 0145 06/10/20 0200   BP: (!) 152/101 152/95 (!) 154/101 122/66   Pulse: 100 101 90 94   Resp:       Temp:       TempSrc:       SpO2: 95% 96% 91% 94%   Weight:       Height:         Medications   sodium chloride 0.9 % bolus 1,000 mL (0 mL Intravenous Stopped 6/10/20 0144)   HYDROmorphone (DILAUDID) injection 1 mg (1 mg Intravenous Given 6/10/20 0050)   ondansetron (ZOFRAN) injection 4 mg (4 mg Intravenous Given 6/10/20 0050)   iopamidol (ISOVUE-300) 61 % injection 100 mL ( Intravenous Canceled Entry 6/10/20 0136)     ECG/EMG Results (last 24 hours)     Procedure Component Value Units Date/Time    ECG 12 Lead [[id number]] Collected:   06/10/20 0033     Updated:  06/10/20 0033        ECG 12 Lead                     MDM    Final diagnoses:   Periumbilical abdominal pain   Alcoholic intoxication without complication (CMS/HCC)            Tani Greer MD  06/10/20 0512

## 2021-01-19 ENCOUNTER — HOSPITAL ENCOUNTER (INPATIENT)
Facility: HOSPITAL | Age: 46
LOS: 23 days | Discharge: HOME | End: 2021-02-11
Attending: EMERGENCY MEDICINE | Admitting: INTERNAL MEDICINE

## 2021-01-19 ENCOUNTER — APPOINTMENT (OUTPATIENT)
Dept: CT IMAGING | Facility: HOSPITAL | Age: 46
End: 2021-01-19

## 2021-01-19 DIAGNOSIS — F10.931 ALCOHOL WITHDRAWAL SYNDROME, WITH DELIRIUM (HCC): ICD-10-CM

## 2021-01-19 DIAGNOSIS — K85.90 ACUTE PANCREATITIS, UNSPECIFIED COMPLICATION STATUS, UNSPECIFIED PANCREATITIS TYPE: Primary | ICD-10-CM

## 2021-01-19 PROBLEM — I10 ESSENTIAL HYPERTENSION: Status: ACTIVE | Noted: 2021-01-19

## 2021-01-19 PROBLEM — F10.10 ALCOHOL ABUSE: Status: ACTIVE | Noted: 2021-01-19

## 2021-01-19 PROBLEM — K21.9 GERD WITHOUT ESOPHAGITIS: Status: ACTIVE | Noted: 2021-01-19

## 2021-01-19 PROBLEM — E87.20 LACTIC ACIDOSIS: Status: ACTIVE | Noted: 2021-01-19

## 2021-01-19 LAB
ALBUMIN SERPL-MCNC: 4.3 G/DL (ref 3.5–5.2)
ALBUMIN/GLOB SERPL: 1.3 G/DL
ALP SERPL-CCNC: 87 U/L (ref 39–117)
ALT SERPL W P-5'-P-CCNC: 15 U/L (ref 1–41)
ANION GAP SERPL CALCULATED.3IONS-SCNC: 17 MMOL/L (ref 5–15)
AST SERPL-CCNC: 26 U/L (ref 1–40)
BASOPHILS # BLD AUTO: 0.08 10*3/MM3 (ref 0–0.2)
BASOPHILS NFR BLD AUTO: 1.1 % (ref 0–1.5)
BILIRUB SERPL-MCNC: 0.5 MG/DL (ref 0–1.2)
BILIRUB UR QL STRIP: NEGATIVE
BUN SERPL-MCNC: 21 MG/DL (ref 6–20)
BUN/CREAT SERPL: 24.7 (ref 7–25)
CALCIUM SPEC-SCNC: 9.4 MG/DL (ref 8.6–10.5)
CHLORIDE SERPL-SCNC: 102 MMOL/L (ref 98–107)
CLARITY UR: CLEAR
CO2 SERPL-SCNC: 20 MMOL/L (ref 22–29)
COLOR UR: YELLOW
CREAT SERPL-MCNC: 0.85 MG/DL (ref 0.76–1.27)
D-LACTATE SERPL-SCNC: 1 MMOL/L (ref 0.5–2)
D-LACTATE SERPL-SCNC: 2.9 MMOL/L (ref 0.5–2)
DEPRECATED RDW RBC AUTO: 45.3 FL (ref 37–54)
EOSINOPHIL # BLD AUTO: 0.1 10*3/MM3 (ref 0–0.4)
EOSINOPHIL NFR BLD AUTO: 1.4 % (ref 0.3–6.2)
ERYTHROCYTE [DISTWIDTH] IN BLOOD BY AUTOMATED COUNT: 15.4 % (ref 12.3–15.4)
GFR SERPL CREATININE-BSD FRML MDRD: 118 ML/MIN/1.73
GLOBULIN UR ELPH-MCNC: 3.3 GM/DL
GLUCOSE SERPL-MCNC: 102 MG/DL (ref 65–99)
GLUCOSE UR STRIP-MCNC: NEGATIVE MG/DL
HCT VFR BLD AUTO: 50.2 % (ref 37.5–51)
HGB BLD-MCNC: 15.8 G/DL (ref 13–17.7)
HGB UR QL STRIP.AUTO: NEGATIVE
HOLD SPECIMEN: NORMAL
HOLD SPECIMEN: NORMAL
IMM GRANULOCYTES # BLD AUTO: 0.02 10*3/MM3 (ref 0–0.05)
IMM GRANULOCYTES NFR BLD AUTO: 0.3 % (ref 0–0.5)
KETONES UR QL STRIP: ABNORMAL
LACTATE HOLD SPECIMEN: NORMAL
LEUKOCYTE ESTERASE UR QL STRIP.AUTO: NEGATIVE
LIPASE SERPL-CCNC: 1298 U/L (ref 13–60)
LYMPHOCYTES # BLD AUTO: 1.83 10*3/MM3 (ref 0.7–3.1)
LYMPHOCYTES NFR BLD AUTO: 25.9 % (ref 19.6–45.3)
MAGNESIUM SERPL-MCNC: 2 MG/DL (ref 1.6–2.6)
MCH RBC QN AUTO: 26 PG (ref 26.6–33)
MCHC RBC AUTO-ENTMCNC: 31.5 G/DL (ref 31.5–35.7)
MCV RBC AUTO: 82.6 FL (ref 79–97)
MONOCYTES # BLD AUTO: 0.48 10*3/MM3 (ref 0.1–0.9)
MONOCYTES NFR BLD AUTO: 6.8 % (ref 5–12)
NEUTROPHILS NFR BLD AUTO: 4.55 10*3/MM3 (ref 1.7–7)
NEUTROPHILS NFR BLD AUTO: 64.5 % (ref 42.7–76)
NITRITE UR QL STRIP: NEGATIVE
NRBC BLD AUTO-RTO: 0 /100 WBC (ref 0–0.2)
PH UR STRIP.AUTO: 6 [PH] (ref 5–8)
PLATELET # BLD AUTO: 371 10*3/MM3 (ref 140–450)
PMV BLD AUTO: 9.8 FL (ref 6–12)
POTASSIUM SERPL-SCNC: 4 MMOL/L (ref 3.5–5.2)
PROT SERPL-MCNC: 7.6 G/DL (ref 6–8.5)
PROT UR QL STRIP: NEGATIVE
RBC # BLD AUTO: 6.08 10*6/MM3 (ref 4.14–5.8)
SODIUM SERPL-SCNC: 139 MMOL/L (ref 136–145)
SP GR UR STRIP: 1.03 (ref 1–1.03)
UROBILINOGEN UR QL STRIP: ABNORMAL
WBC # BLD AUTO: 7.06 10*3/MM3 (ref 3.4–10.8)
WHOLE BLOOD HOLD SPECIMEN: NORMAL
WHOLE BLOOD HOLD SPECIMEN: NORMAL

## 2021-01-19 PROCEDURE — 25010000002 HYDROMORPHONE PER 4 MG: Performed by: EMERGENCY MEDICINE

## 2021-01-19 PROCEDURE — 99223 1ST HOSP IP/OBS HIGH 75: CPT | Performed by: FAMILY MEDICINE

## 2021-01-19 PROCEDURE — 93005 ELECTROCARDIOGRAM TRACING: CPT | Performed by: NURSE PRACTITIONER

## 2021-01-19 PROCEDURE — 25010000002 HYDRALAZINE PER 20 MG: Performed by: FAMILY MEDICINE

## 2021-01-19 PROCEDURE — 25010000002 ONDANSETRON PER 1 MG: Performed by: FAMILY MEDICINE

## 2021-01-19 PROCEDURE — 25010000002 ONDANSETRON PER 1 MG: Performed by: EMERGENCY MEDICINE

## 2021-01-19 PROCEDURE — 85025 COMPLETE CBC W/AUTO DIFF WBC: CPT | Performed by: EMERGENCY MEDICINE

## 2021-01-19 PROCEDURE — 74177 CT ABD & PELVIS W/CONTRAST: CPT

## 2021-01-19 PROCEDURE — 25010000002 DIAZEPAM PER 5 MG: Performed by: FAMILY MEDICINE

## 2021-01-19 PROCEDURE — 25010000002 KETOROLAC TROMETHAMINE PER 15 MG: Performed by: EMERGENCY MEDICINE

## 2021-01-19 PROCEDURE — 81003 URINALYSIS AUTO W/O SCOPE: CPT | Performed by: EMERGENCY MEDICINE

## 2021-01-19 PROCEDURE — 83690 ASSAY OF LIPASE: CPT | Performed by: EMERGENCY MEDICINE

## 2021-01-19 PROCEDURE — 25010000002 THIAMINE PER 100 MG: Performed by: FAMILY MEDICINE

## 2021-01-19 PROCEDURE — 25010000002 IOPAMIDOL 61 % SOLUTION: Performed by: EMERGENCY MEDICINE

## 2021-01-19 PROCEDURE — 25010000002 HYDROMORPHONE 1 MG/ML SOLUTION: Performed by: EMERGENCY MEDICINE

## 2021-01-19 PROCEDURE — 80053 COMPREHEN METABOLIC PANEL: CPT | Performed by: EMERGENCY MEDICINE

## 2021-01-19 PROCEDURE — 99284 EMERGENCY DEPT VISIT MOD MDM: CPT

## 2021-01-19 PROCEDURE — 25010000002 HYDROMORPHONE 1 MG/ML SOLUTION: Performed by: FAMILY MEDICINE

## 2021-01-19 PROCEDURE — 25010000002 ENOXAPARIN PER 10 MG: Performed by: FAMILY MEDICINE

## 2021-01-19 PROCEDURE — 83735 ASSAY OF MAGNESIUM: CPT | Performed by: NURSE PRACTITIONER

## 2021-01-19 PROCEDURE — 83605 ASSAY OF LACTIC ACID: CPT | Performed by: EMERGENCY MEDICINE

## 2021-01-19 RX ORDER — LORAZEPAM 2 MG/ML
2 INJECTION INTRAMUSCULAR
Status: DISCONTINUED | OUTPATIENT
Start: 2021-01-19 | End: 2021-01-22

## 2021-01-19 RX ORDER — SODIUM CHLORIDE 9 MG/ML
10 INJECTION INTRAVENOUS AS NEEDED
Status: DISCONTINUED | OUTPATIENT
Start: 2021-01-19 | End: 2021-01-25

## 2021-01-19 RX ORDER — LABETALOL HYDROCHLORIDE 5 MG/ML
10 INJECTION, SOLUTION INTRAVENOUS ONCE
Status: COMPLETED | OUTPATIENT
Start: 2021-01-19 | End: 2021-01-19

## 2021-01-19 RX ORDER — SODIUM CHLORIDE 0.9 % (FLUSH) 0.9 %
10 SYRINGE (ML) INJECTION AS NEEDED
Status: DISCONTINUED | OUTPATIENT
Start: 2021-01-19 | End: 2021-01-25

## 2021-01-19 RX ORDER — DIAZEPAM 5 MG/ML
5 INJECTION, SOLUTION INTRAMUSCULAR; INTRAVENOUS EVERY 6 HOURS
Status: DISCONTINUED | OUTPATIENT
Start: 2021-01-19 | End: 2021-01-20

## 2021-01-19 RX ORDER — ACETAMINOPHEN 650 MG/1
650 SUPPOSITORY RECTAL EVERY 4 HOURS PRN
Status: DISCONTINUED | OUTPATIENT
Start: 2021-01-19 | End: 2021-01-22

## 2021-01-19 RX ORDER — KETOROLAC TROMETHAMINE 30 MG/ML
30 INJECTION, SOLUTION INTRAMUSCULAR; INTRAVENOUS EVERY 6 HOURS PRN
Status: DISCONTINUED | OUTPATIENT
Start: 2021-01-19 | End: 2021-01-19

## 2021-01-19 RX ORDER — LORAZEPAM 1 MG/1
2 TABLET ORAL
Status: DISCONTINUED | OUTPATIENT
Start: 2021-01-19 | End: 2021-01-22

## 2021-01-19 RX ORDER — SODIUM CHLORIDE 0.9 % (FLUSH) 0.9 %
10 SYRINGE (ML) INJECTION EVERY 12 HOURS SCHEDULED
Status: DISCONTINUED | OUTPATIENT
Start: 2021-01-19 | End: 2021-01-22

## 2021-01-19 RX ORDER — SODIUM CHLORIDE 0.9 % (FLUSH) 0.9 %
10 SYRINGE (ML) INJECTION AS NEEDED
Status: DISCONTINUED | OUTPATIENT
Start: 2021-01-19 | End: 2021-01-22

## 2021-01-19 RX ORDER — LORAZEPAM 2 MG/ML
1 INJECTION INTRAMUSCULAR
Status: DISCONTINUED | OUTPATIENT
Start: 2021-01-19 | End: 2021-01-22

## 2021-01-19 RX ORDER — SODIUM CHLORIDE 9 MG/ML
125 INJECTION, SOLUTION INTRAVENOUS CONTINUOUS
Status: DISCONTINUED | OUTPATIENT
Start: 2021-01-19 | End: 2021-01-21

## 2021-01-19 RX ORDER — HYDROMORPHONE HYDROCHLORIDE 1 MG/ML
0.5 INJECTION, SOLUTION INTRAMUSCULAR; INTRAVENOUS; SUBCUTANEOUS ONCE
Status: COMPLETED | OUTPATIENT
Start: 2021-01-19 | End: 2021-01-19

## 2021-01-19 RX ORDER — ONDANSETRON 4 MG/1
4 TABLET, FILM COATED ORAL EVERY 6 HOURS PRN
Status: DISCONTINUED | OUTPATIENT
Start: 2021-01-19 | End: 2021-01-22

## 2021-01-19 RX ORDER — ACETAMINOPHEN 325 MG/1
650 TABLET ORAL EVERY 4 HOURS PRN
Status: DISCONTINUED | OUTPATIENT
Start: 2021-01-19 | End: 2021-01-22

## 2021-01-19 RX ORDER — PANTOPRAZOLE SODIUM 40 MG/10ML
40 INJECTION, POWDER, LYOPHILIZED, FOR SOLUTION INTRAVENOUS
Status: DISCONTINUED | OUTPATIENT
Start: 2021-01-20 | End: 2021-01-20

## 2021-01-19 RX ORDER — ACETAMINOPHEN 160 MG/5ML
650 SOLUTION ORAL EVERY 4 HOURS PRN
Status: DISCONTINUED | OUTPATIENT
Start: 2021-01-19 | End: 2021-01-23

## 2021-01-19 RX ORDER — ONDANSETRON 2 MG/ML
4 INJECTION INTRAMUSCULAR; INTRAVENOUS EVERY 6 HOURS PRN
Status: DISCONTINUED | OUTPATIENT
Start: 2021-01-19 | End: 2021-02-11 | Stop reason: HOSPADM

## 2021-01-19 RX ORDER — LORAZEPAM 1 MG/1
1 TABLET ORAL
Status: DISCONTINUED | OUTPATIENT
Start: 2021-01-19 | End: 2021-01-22

## 2021-01-19 RX ORDER — NALOXONE HCL 0.4 MG/ML
0.4 VIAL (ML) INJECTION
Status: DISCONTINUED | OUTPATIENT
Start: 2021-01-19 | End: 2021-01-22

## 2021-01-19 RX ORDER — ONDANSETRON 2 MG/ML
4 INJECTION INTRAMUSCULAR; INTRAVENOUS ONCE
Status: COMPLETED | OUTPATIENT
Start: 2021-01-19 | End: 2021-01-19

## 2021-01-19 RX ORDER — HYDRALAZINE HYDROCHLORIDE 20 MG/ML
10 INJECTION INTRAMUSCULAR; INTRAVENOUS EVERY 6 HOURS PRN
Status: DISCONTINUED | OUTPATIENT
Start: 2021-01-19 | End: 2021-02-11 | Stop reason: HOSPADM

## 2021-01-19 RX ADMIN — HYDROMORPHONE HYDROCHLORIDE 1 MG: 1 INJECTION, SOLUTION INTRAMUSCULAR; INTRAVENOUS; SUBCUTANEOUS at 09:44

## 2021-01-19 RX ADMIN — SODIUM CHLORIDE, PRESERVATIVE FREE 10 ML: 5 INJECTION INTRAVENOUS at 20:43

## 2021-01-19 RX ADMIN — DIAZEPAM 5 MG: 5 INJECTION, SOLUTION INTRAMUSCULAR; INTRAVENOUS at 20:37

## 2021-01-19 RX ADMIN — HYDROMORPHONE HYDROCHLORIDE 1 MG: 1 INJECTION, SOLUTION INTRAMUSCULAR; INTRAVENOUS; SUBCUTANEOUS at 14:14

## 2021-01-19 RX ADMIN — KETOROLAC TROMETHAMINE 30 MG: 30 INJECTION, SOLUTION INTRAMUSCULAR; INTRAVENOUS at 11:01

## 2021-01-19 RX ADMIN — HYDROMORPHONE HYDROCHLORIDE 1 MG: 1 INJECTION, SOLUTION INTRAMUSCULAR; INTRAVENOUS; SUBCUTANEOUS at 22:05

## 2021-01-19 RX ADMIN — LABETALOL 20 MG/4 ML (5 MG/ML) INTRAVENOUS SYRINGE 10 MG: at 18:38

## 2021-01-19 RX ADMIN — SODIUM CHLORIDE 125 ML/HR: 9 INJECTION, SOLUTION INTRAVENOUS at 14:30

## 2021-01-19 RX ADMIN — ENOXAPARIN SODIUM 40 MG: 40 INJECTION SUBCUTANEOUS at 14:31

## 2021-01-19 RX ADMIN — IOPAMIDOL 100 ML: 612 INJECTION, SOLUTION INTRAVENOUS at 12:04

## 2021-01-19 RX ADMIN — HYDROMORPHONE HYDROCHLORIDE 1 MG: 1 INJECTION, SOLUTION INTRAMUSCULAR; INTRAVENOUS; SUBCUTANEOUS at 17:13

## 2021-01-19 RX ADMIN — DIAZEPAM 5 MG: 5 INJECTION, SOLUTION INTRAMUSCULAR; INTRAVENOUS at 16:18

## 2021-01-19 RX ADMIN — THIAMINE HYDROCHLORIDE 50 ML/HR: 100 INJECTION, SOLUTION INTRAMUSCULAR; INTRAVENOUS at 16:18

## 2021-01-19 RX ADMIN — SODIUM CHLORIDE 1000 ML: 9 INJECTION, SOLUTION INTRAVENOUS at 09:44

## 2021-01-19 RX ADMIN — HYDROMORPHONE HYDROCHLORIDE 0.5 MG: 1 INJECTION, SOLUTION INTRAMUSCULAR; INTRAVENOUS; SUBCUTANEOUS at 13:21

## 2021-01-19 RX ADMIN — ONDANSETRON 4 MG: 2 INJECTION INTRAMUSCULAR; INTRAVENOUS at 09:44

## 2021-01-19 RX ADMIN — HYDRALAZINE HYDROCHLORIDE 10 MG: 20 INJECTION INTRAMUSCULAR; INTRAVENOUS at 17:05

## 2021-01-19 RX ADMIN — HYDROMORPHONE HYDROCHLORIDE 1 MG: 1 INJECTION, SOLUTION INTRAMUSCULAR; INTRAVENOUS; SUBCUTANEOUS at 19:53

## 2021-01-19 RX ADMIN — ONDANSETRON 4 MG: 2 INJECTION INTRAMUSCULAR; INTRAVENOUS at 20:37

## 2021-01-20 ENCOUNTER — APPOINTMENT (OUTPATIENT)
Dept: GENERAL RADIOLOGY | Facility: HOSPITAL | Age: 46
End: 2021-01-20

## 2021-01-20 LAB
ALBUMIN SERPL-MCNC: 3.6 G/DL (ref 3.5–5.2)
ALBUMIN/GLOB SERPL: 1 G/DL
ALP SERPL-CCNC: 78 U/L (ref 39–117)
ALT SERPL W P-5'-P-CCNC: 12 U/L (ref 1–41)
ANION GAP SERPL CALCULATED.3IONS-SCNC: 18 MMOL/L (ref 5–15)
AST SERPL-CCNC: 25 U/L (ref 1–40)
BILIRUB SERPL-MCNC: 0.8 MG/DL (ref 0–1.2)
BUN SERPL-MCNC: 21 MG/DL (ref 6–20)
BUN/CREAT SERPL: 18.1 (ref 7–25)
CALCIUM SPEC-SCNC: 8.5 MG/DL (ref 8.6–10.5)
CHLORIDE SERPL-SCNC: 104 MMOL/L (ref 98–107)
CHOLEST SERPL-MCNC: 165 MG/DL (ref 0–200)
CO2 SERPL-SCNC: 16 MMOL/L (ref 22–29)
CREAT SERPL-MCNC: 1.16 MG/DL (ref 0.76–1.27)
D-LACTATE SERPL-SCNC: 2 MMOL/L (ref 0.5–2)
DEPRECATED RDW RBC AUTO: 46.7 FL (ref 37–54)
ERYTHROCYTE [DISTWIDTH] IN BLOOD BY AUTOMATED COUNT: 16.8 % (ref 12.3–15.4)
GFR SERPL CREATININE-BSD FRML MDRD: 82 ML/MIN/1.73
GLOBULIN UR ELPH-MCNC: 3.6 GM/DL
GLUCOSE BLDC GLUCOMTR-MCNC: 101 MG/DL (ref 70–130)
GLUCOSE SERPL-MCNC: 102 MG/DL (ref 65–99)
HCT VFR BLD AUTO: 56.4 % (ref 37.5–51)
HDLC SERPL-MCNC: 35 MG/DL (ref 40–60)
HGB BLD-MCNC: 17.9 G/DL (ref 13–17.7)
LDLC SERPL CALC-MCNC: 39 MG/DL (ref 0–100)
LDLC/HDLC SERPL: -0.03 {RATIO}
LIPASE SERPL-CCNC: 875 U/L (ref 13–60)
MAGNESIUM SERPL-MCNC: 1.7 MG/DL (ref 1.6–2.6)
MCH RBC QN AUTO: 27.1 PG (ref 26.6–33)
MCHC RBC AUTO-ENTMCNC: 31.7 G/DL (ref 31.5–35.7)
MCV RBC AUTO: 85.5 FL (ref 79–97)
NT-PROBNP SERPL-MCNC: 406.6 PG/ML (ref 0–450)
PLATELET # BLD AUTO: 314 10*3/MM3 (ref 140–450)
PMV BLD AUTO: 10 FL (ref 6–12)
POTASSIUM SERPL-SCNC: 4.6 MMOL/L (ref 3.5–5.2)
PROCALCITONIN SERPL-MCNC: 0.36 NG/ML (ref 0–0.25)
PROT SERPL-MCNC: 7.2 G/DL (ref 6–8.5)
RBC # BLD AUTO: 6.6 10*6/MM3 (ref 4.14–5.8)
SARS-COV-2 RNA RESP QL NAA+PROBE: NOT DETECTED
SODIUM SERPL-SCNC: 138 MMOL/L (ref 136–145)
TRIGL SERPL-MCNC: 655 MG/DL (ref 0–150)
TROPONIN T SERPL-MCNC: <0.01 NG/ML (ref 0–0.03)
TSH SERPL DL<=0.05 MIU/L-ACNC: 0.96 UIU/ML (ref 0.27–4.2)
VLDLC SERPL-MCNC: 91 MG/DL (ref 5–40)
WBC # BLD AUTO: 15.88 10*3/MM3 (ref 3.4–10.8)

## 2021-01-20 PROCEDURE — 80061 LIPID PANEL: CPT | Performed by: FAMILY MEDICINE

## 2021-01-20 PROCEDURE — 71045 X-RAY EXAM CHEST 1 VIEW: CPT

## 2021-01-20 PROCEDURE — 25010000002 LORAZEPAM PER 2 MG: Performed by: FAMILY MEDICINE

## 2021-01-20 PROCEDURE — 25010000003 MAGNESIUM SULFATE 4 GM/100ML SOLUTION: Performed by: NURSE PRACTITIONER

## 2021-01-20 PROCEDURE — 74018 RADEX ABDOMEN 1 VIEW: CPT

## 2021-01-20 PROCEDURE — 84145 PROCALCITONIN (PCT): CPT | Performed by: NURSE PRACTITIONER

## 2021-01-20 PROCEDURE — 25010000002 HYDROMORPHONE PER 4 MG: Performed by: FAMILY MEDICINE

## 2021-01-20 PROCEDURE — 99232 SBSQ HOSP IP/OBS MODERATE 35: CPT | Performed by: FAMILY MEDICINE

## 2021-01-20 PROCEDURE — 83690 ASSAY OF LIPASE: CPT | Performed by: FAMILY MEDICINE

## 2021-01-20 PROCEDURE — 80050 GENERAL HEALTH PANEL: CPT | Performed by: FAMILY MEDICINE

## 2021-01-20 PROCEDURE — 83880 ASSAY OF NATRIURETIC PEPTIDE: CPT | Performed by: NURSE PRACTITIONER

## 2021-01-20 PROCEDURE — 93010 ELECTROCARDIOGRAM REPORT: CPT | Performed by: INTERNAL MEDICINE

## 2021-01-20 PROCEDURE — 25010000002 DIAZEPAM PER 5 MG: Performed by: FAMILY MEDICINE

## 2021-01-20 PROCEDURE — 25010000002 THIAMINE PER 100 MG: Performed by: FAMILY MEDICINE

## 2021-01-20 PROCEDURE — 25010000002 HYDROMORPHONE 1 MG/ML SOLUTION: Performed by: FAMILY MEDICINE

## 2021-01-20 PROCEDURE — 87040 BLOOD CULTURE FOR BACTERIA: CPT | Performed by: NURSE PRACTITIONER

## 2021-01-20 PROCEDURE — 25010000002 ONDANSETRON PER 1 MG: Performed by: FAMILY MEDICINE

## 2021-01-20 PROCEDURE — U0004 COV-19 TEST NON-CDC HGH THRU: HCPCS | Performed by: FAMILY MEDICINE

## 2021-01-20 PROCEDURE — 94799 UNLISTED PULMONARY SVC/PX: CPT

## 2021-01-20 PROCEDURE — 25010000002 ENOXAPARIN PER 10 MG: Performed by: FAMILY MEDICINE

## 2021-01-20 PROCEDURE — 82962 GLUCOSE BLOOD TEST: CPT

## 2021-01-20 PROCEDURE — 83735 ASSAY OF MAGNESIUM: CPT | Performed by: FAMILY MEDICINE

## 2021-01-20 PROCEDURE — 83605 ASSAY OF LACTIC ACID: CPT | Performed by: NURSE PRACTITIONER

## 2021-01-20 PROCEDURE — 84484 ASSAY OF TROPONIN QUANT: CPT | Performed by: NURSE PRACTITIONER

## 2021-01-20 RX ORDER — METOPROLOL TARTRATE 5 MG/5ML
2.5 INJECTION INTRAVENOUS EVERY 6 HOURS PRN
Status: DISCONTINUED | OUTPATIENT
Start: 2021-01-20 | End: 2021-02-11 | Stop reason: HOSPADM

## 2021-01-20 RX ORDER — HYDROMORPHONE HYDROCHLORIDE 1 MG/ML
0.5 INJECTION, SOLUTION INTRAMUSCULAR; INTRAVENOUS; SUBCUTANEOUS ONCE
Status: COMPLETED | OUTPATIENT
Start: 2021-01-20 | End: 2021-01-20

## 2021-01-20 RX ORDER — MAGNESIUM SULFATE HEPTAHYDRATE 40 MG/ML
2 INJECTION, SOLUTION INTRAVENOUS AS NEEDED
Status: DISCONTINUED | OUTPATIENT
Start: 2021-01-20 | End: 2021-01-22

## 2021-01-20 RX ORDER — IPRATROPIUM BROMIDE AND ALBUTEROL SULFATE 2.5; .5 MG/3ML; MG/3ML
3 SOLUTION RESPIRATORY (INHALATION) EVERY 4 HOURS PRN
Status: DISCONTINUED | OUTPATIENT
Start: 2021-01-20 | End: 2021-01-21

## 2021-01-20 RX ORDER — PANTOPRAZOLE SODIUM 40 MG/1
40 TABLET, DELAYED RELEASE ORAL
Status: DISCONTINUED | OUTPATIENT
Start: 2021-01-21 | End: 2021-01-22

## 2021-01-20 RX ORDER — MAGNESIUM SULFATE HEPTAHYDRATE 40 MG/ML
4 INJECTION, SOLUTION INTRAVENOUS AS NEEDED
Status: DISCONTINUED | OUTPATIENT
Start: 2021-01-20 | End: 2021-01-22

## 2021-01-20 RX ORDER — DIPHENOXYLATE HYDROCHLORIDE AND ATROPINE SULFATE 2.5; .025 MG/1; MG/1
1 TABLET ORAL DAILY
Status: DISCONTINUED | OUTPATIENT
Start: 2021-01-21 | End: 2021-01-22

## 2021-01-20 RX ORDER — SIMETHICONE 80 MG
80 TABLET,CHEWABLE ORAL 4 TIMES DAILY PRN
Status: DISCONTINUED | OUTPATIENT
Start: 2021-01-20 | End: 2021-01-22

## 2021-01-20 RX ORDER — TRAMADOL HYDROCHLORIDE 50 MG/1
100 TABLET ORAL EVERY 6 HOURS PRN
Status: DISCONTINUED | OUTPATIENT
Start: 2021-01-20 | End: 2021-01-22

## 2021-01-20 RX ORDER — HYDROCODONE BITARTRATE AND ACETAMINOPHEN 5; 325 MG/1; MG/1
1 TABLET ORAL EVERY 6 HOURS PRN
Status: DISPENSED | OUTPATIENT
Start: 2021-01-20 | End: 2021-01-27

## 2021-01-20 RX ORDER — DIPHENOXYLATE HYDROCHLORIDE AND ATROPINE SULFATE 2.5; .025 MG/1; MG/1
1 TABLET ORAL ONCE
Status: COMPLETED | OUTPATIENT
Start: 2021-01-20 | End: 2021-01-20

## 2021-01-20 RX ORDER — FOLIC ACID 1 MG/1
1 TABLET ORAL DAILY
Status: DISCONTINUED | OUTPATIENT
Start: 2021-01-21 | End: 2021-01-22

## 2021-01-20 RX ORDER — DIAZEPAM 5 MG/1
5 TABLET ORAL EVERY 6 HOURS
Status: COMPLETED | OUTPATIENT
Start: 2021-01-20 | End: 2021-01-21

## 2021-01-20 RX ADMIN — THIAMINE HYDROCHLORIDE 50 ML/HR: 100 INJECTION, SOLUTION INTRAMUSCULAR; INTRAVENOUS at 10:15

## 2021-01-20 RX ADMIN — HYDROMORPHONE HYDROCHLORIDE 1 MG: 1 INJECTION, SOLUTION INTRAMUSCULAR; INTRAVENOUS; SUBCUTANEOUS at 00:10

## 2021-01-20 RX ADMIN — HYDROMORPHONE HYDROCHLORIDE 1 MG: 1 INJECTION, SOLUTION INTRAMUSCULAR; INTRAVENOUS; SUBCUTANEOUS at 11:11

## 2021-01-20 RX ADMIN — TRAMADOL HYDROCHLORIDE 100 MG: 50 TABLET, FILM COATED ORAL at 21:17

## 2021-01-20 RX ADMIN — HYDROMORPHONE HYDROCHLORIDE 1 MG: 1 INJECTION, SOLUTION INTRAMUSCULAR; INTRAVENOUS; SUBCUTANEOUS at 03:59

## 2021-01-20 RX ADMIN — METOPROLOL TARTRATE 2.5 MG: 5 INJECTION INTRAVENOUS at 23:19

## 2021-01-20 RX ADMIN — DIAZEPAM 5 MG: 5 INJECTION, SOLUTION INTRAMUSCULAR; INTRAVENOUS at 04:00

## 2021-01-20 RX ADMIN — DIAZEPAM 5 MG: 5 TABLET ORAL at 21:17

## 2021-01-20 RX ADMIN — SODIUM CHLORIDE 125 ML/HR: 9 INJECTION, SOLUTION INTRAVENOUS at 00:09

## 2021-01-20 RX ADMIN — PANTOPRAZOLE SODIUM 40 MG: 40 INJECTION, POWDER, FOR SOLUTION INTRAVENOUS at 06:18

## 2021-01-20 RX ADMIN — HYDROCODONE BITARTRATE AND ACETAMINOPHEN 1 TABLET: 5; 325 TABLET ORAL at 16:34

## 2021-01-20 RX ADMIN — LORAZEPAM 2 MG: 2 INJECTION INTRAMUSCULAR; INTRAVENOUS at 04:10

## 2021-01-20 RX ADMIN — HYDROCODONE BITARTRATE AND ACETAMINOPHEN 1 TABLET: 5; 325 TABLET ORAL at 23:19

## 2021-01-20 RX ADMIN — LORAZEPAM 1 MG: 2 INJECTION INTRAMUSCULAR; INTRAVENOUS at 00:49

## 2021-01-20 RX ADMIN — MULTIVITAMIN TABLET 1 TABLET: TABLET at 15:10

## 2021-01-20 RX ADMIN — HYDROMORPHONE HYDROCHLORIDE 0.5 MG: 1 INJECTION, SOLUTION INTRAMUSCULAR; INTRAVENOUS; SUBCUTANEOUS at 20:12

## 2021-01-20 RX ADMIN — IPRATROPIUM BROMIDE AND ALBUTEROL SULFATE 3 ML: 2.5; .5 SOLUTION RESPIRATORY (INHALATION) at 18:15

## 2021-01-20 RX ADMIN — HYDROMORPHONE HYDROCHLORIDE 1 MG: 1 INJECTION, SOLUTION INTRAMUSCULAR; INTRAVENOUS; SUBCUTANEOUS at 08:22

## 2021-01-20 RX ADMIN — ONDANSETRON 4 MG: 2 INJECTION INTRAMUSCULAR; INTRAVENOUS at 23:19

## 2021-01-20 RX ADMIN — TRAMADOL HYDROCHLORIDE 100 MG: 50 TABLET, FILM COATED ORAL at 15:10

## 2021-01-20 RX ADMIN — ENOXAPARIN SODIUM 40 MG: 40 INJECTION SUBCUTANEOUS at 08:11

## 2021-01-20 RX ADMIN — MAGNESIUM SULFATE HEPTAHYDRATE 4 G: 40 INJECTION, SOLUTION INTRAVENOUS at 05:11

## 2021-01-20 RX ADMIN — METOPROLOL TARTRATE 2.5 MG: 5 INJECTION INTRAVENOUS at 01:23

## 2021-01-20 RX ADMIN — DIAZEPAM 5 MG: 5 TABLET ORAL at 15:10

## 2021-01-20 RX ADMIN — SODIUM CHLORIDE 125 ML/HR: 9 INJECTION, SOLUTION INTRAVENOUS at 08:23

## 2021-01-20 RX ADMIN — ONDANSETRON 4 MG: 2 INJECTION INTRAMUSCULAR; INTRAVENOUS at 11:11

## 2021-01-21 ENCOUNTER — APPOINTMENT (OUTPATIENT)
Dept: GENERAL RADIOLOGY | Facility: HOSPITAL | Age: 46
End: 2021-01-21

## 2021-01-21 ENCOUNTER — APPOINTMENT (OUTPATIENT)
Dept: CARDIOLOGY | Facility: HOSPITAL | Age: 46
End: 2021-01-21

## 2021-01-21 ENCOUNTER — APPOINTMENT (OUTPATIENT)
Dept: CT IMAGING | Facility: HOSPITAL | Age: 46
End: 2021-01-21

## 2021-01-21 PROBLEM — N17.9 AKI (ACUTE KIDNEY INJURY) (HCC): Status: ACTIVE | Noted: 2021-01-21

## 2021-01-21 PROBLEM — E66.01 MORBID OBESITY (HCC): Status: ACTIVE | Noted: 2021-01-21

## 2021-01-21 LAB
AMMONIA BLD-SCNC: 48 UMOL/L (ref 16–60)
ANION GAP SERPL CALCULATED.3IONS-SCNC: 15 MMOL/L (ref 5–15)
ANION GAP SERPL CALCULATED.3IONS-SCNC: 15 MMOL/L (ref 5–15)
ANION GAP SERPL CALCULATED.3IONS-SCNC: 16 MMOL/L (ref 5–15)
ARTERIAL PATENCY WRIST A: ABNORMAL
ATMOSPHERIC PRESS: ABNORMAL MM[HG]
BASE EXCESS BLDA CALC-SCNC: -4.8 MMOL/L (ref 0–2)
BASE EXCESS BLDA CALC-SCNC: -5.9 MMOL/L (ref 0–2)
BASE EXCESS BLDA CALC-SCNC: -7.1 MMOL/L (ref 0–2)
BASOPHILS # BLD AUTO: 0.03 10*3/MM3 (ref 0–0.2)
BASOPHILS # BLD MANUAL: 0 10*3/MM3 (ref 0–0.2)
BASOPHILS NFR BLD AUTO: 0 % (ref 0–1.5)
BASOPHILS NFR BLD AUTO: 0.3 % (ref 0–1.5)
BDY SITE: ABNORMAL
BODY TEMPERATURE: 37 C
BUN SERPL-MCNC: 34 MG/DL (ref 6–20)
BUN/CREAT SERPL: 14.8 (ref 7–25)
BUN/CREAT SERPL: 16 (ref 7–25)
BUN/CREAT SERPL: 9.9 (ref 7–25)
CALCIUM SPEC-SCNC: 8.4 MG/DL (ref 8.6–10.5)
CALCIUM SPEC-SCNC: 8.5 MG/DL (ref 8.6–10.5)
CALCIUM SPEC-SCNC: 8.6 MG/DL (ref 8.6–10.5)
CHLORIDE SERPL-SCNC: 102 MMOL/L (ref 98–107)
CHLORIDE SERPL-SCNC: 105 MMOL/L (ref 98–107)
CHLORIDE SERPL-SCNC: 105 MMOL/L (ref 98–107)
CO2 BLDA-SCNC: 19.4 MMOL/L (ref 22–33)
CO2 BLDA-SCNC: 19.7 MMOL/L (ref 22–33)
CO2 BLDA-SCNC: 21.1 MMOL/L (ref 22–33)
CO2 SERPL-SCNC: 15 MMOL/L (ref 22–29)
CO2 SERPL-SCNC: 16 MMOL/L (ref 22–29)
CO2 SERPL-SCNC: 19 MMOL/L (ref 22–29)
COHGB MFR BLD: 0.9 % (ref 0–2)
COHGB MFR BLD: 1.2 % (ref 0–2)
COHGB MFR BLD: 1.3 % (ref 0–2)
CREAT SERPL-MCNC: 2.12 MG/DL (ref 0.76–1.27)
CREAT SERPL-MCNC: 2.29 MG/DL (ref 0.76–1.27)
CREAT SERPL-MCNC: 3.44 MG/DL (ref 0.76–1.27)
D-LACTATE SERPL-SCNC: 1 MMOL/L (ref 0.5–2)
DEPRECATED RDW RBC AUTO: 47.8 FL (ref 37–54)
DEPRECATED RDW RBC AUTO: 48.1 FL (ref 37–54)
EOSINOPHIL # BLD AUTO: 0.08 10*3/MM3 (ref 0–0.4)
EOSINOPHIL # BLD MANUAL: 0 10*3/MM3 (ref 0–0.4)
EOSINOPHIL NFR BLD AUTO: 0.8 % (ref 0.3–6.2)
EOSINOPHIL NFR BLD MANUAL: 0 % (ref 0.3–6.2)
EPAP: 0
ERYTHROCYTE [DISTWIDTH] IN BLOOD BY AUTOMATED COUNT: 15.3 % (ref 12.3–15.4)
ERYTHROCYTE [DISTWIDTH] IN BLOOD BY AUTOMATED COUNT: 15.6 % (ref 12.3–15.4)
GFR SERPL CREATININE-BSD FRML MDRD: 23 ML/MIN/1.73
GFR SERPL CREATININE-BSD FRML MDRD: 37 ML/MIN/1.73
GFR SERPL CREATININE-BSD FRML MDRD: 41 ML/MIN/1.73
GLUCOSE SERPL-MCNC: 106 MG/DL (ref 65–99)
GLUCOSE SERPL-MCNC: 106 MG/DL (ref 65–99)
GLUCOSE SERPL-MCNC: 135 MG/DL (ref 65–99)
HCO3 BLDA-SCNC: 18.3 MMOL/L (ref 20–26)
HCO3 BLDA-SCNC: 18.7 MMOL/L (ref 20–26)
HCO3 BLDA-SCNC: 20 MMOL/L (ref 20–26)
HCT VFR BLD AUTO: 45.7 % (ref 37.5–51)
HCT VFR BLD AUTO: 51 % (ref 37.5–51)
HCT VFR BLD CALC: 44.5 %
HCT VFR BLD CALC: 45.9 %
HCT VFR BLD CALC: 49 %
HGB BLD-MCNC: 14.1 G/DL (ref 13–17.7)
HGB BLD-MCNC: 15.7 G/DL (ref 13–17.7)
HGB BLDA-MCNC: 14.5 G/DL (ref 13.5–17.5)
HGB BLDA-MCNC: 15 G/DL (ref 13.5–17.5)
HGB BLDA-MCNC: 16 G/DL (ref 13.5–17.5)
IMM GRANULOCYTES # BLD AUTO: 0.06 10*3/MM3 (ref 0–0.05)
IMM GRANULOCYTES NFR BLD AUTO: 0.6 % (ref 0–0.5)
INHALED O2 CONCENTRATION: 21 %
INHALED O2 CONCENTRATION: 21 %
INHALED O2 CONCENTRATION: 30 %
IPAP: 0
LIPASE SERPL-CCNC: 758 U/L (ref 13–60)
LYMPHOCYTES # BLD AUTO: 0.79 10*3/MM3 (ref 0.7–3.1)
LYMPHOCYTES # BLD MANUAL: 0.82 10*3/MM3 (ref 0.7–3.1)
LYMPHOCYTES NFR BLD AUTO: 7.7 % (ref 19.6–45.3)
LYMPHOCYTES NFR BLD MANUAL: 3 % (ref 5–12)
LYMPHOCYTES NFR BLD MANUAL: 7 % (ref 19.6–45.3)
MAGNESIUM SERPL-MCNC: 2.4 MG/DL (ref 1.6–2.6)
MAGNESIUM SERPL-MCNC: 2.4 MG/DL (ref 1.6–2.6)
MCH RBC QN AUTO: 26.3 PG (ref 26.6–33)
MCH RBC QN AUTO: 26.7 PG (ref 26.6–33)
MCHC RBC AUTO-ENTMCNC: 30.8 G/DL (ref 31.5–35.7)
MCHC RBC AUTO-ENTMCNC: 30.9 G/DL (ref 31.5–35.7)
MCV RBC AUTO: 85.1 FL (ref 79–97)
MCV RBC AUTO: 86.7 FL (ref 79–97)
METHGB BLD QL: 0.4 % (ref 0–1.5)
METHGB BLD QL: 0.5 % (ref 0–1.5)
METHGB BLD QL: 0.6 % (ref 0–1.5)
MODALITY: ABNORMAL
MONOCYTES # BLD AUTO: 0.35 10*3/MM3 (ref 0.1–0.9)
MONOCYTES # BLD AUTO: 0.53 10*3/MM3 (ref 0.1–0.9)
MONOCYTES NFR BLD AUTO: 5.2 % (ref 5–12)
NEUTROPHILS # BLD AUTO: 10.58 10*3/MM3 (ref 1.7–7)
NEUTROPHILS NFR BLD AUTO: 8.74 10*3/MM3 (ref 1.7–7)
NEUTROPHILS NFR BLD AUTO: 85.4 % (ref 42.7–76)
NEUTROPHILS NFR BLD MANUAL: 62 % (ref 42.7–76)
NEUTS BAND NFR BLD MANUAL: 28 % (ref 0–5)
NOTE: ABNORMAL
NRBC BLD AUTO-RTO: 0.2 /100 WBC (ref 0–0.2)
NT-PROBNP SERPL-MCNC: 328 PG/ML (ref 0–450)
NT-PROBNP SERPL-MCNC: 809 PG/ML (ref 0–450)
OXYHGB MFR BLDV: 89.6 % (ref 94–99)
OXYHGB MFR BLDV: 91 % (ref 94–99)
OXYHGB MFR BLDV: 95.7 % (ref 94–99)
PAW @ PEAK INSP FLOW SETTING VENT: 0 CMH2O
PCO2 BLDA: 33.6 MM HG (ref 35–45)
PCO2 BLDA: 35.5 MM HG (ref 35–45)
PCO2 BLDA: 36.1 MM HG (ref 35–45)
PCO2 TEMP ADJ BLD: 33.6 MM HG (ref 35–48)
PCO2 TEMP ADJ BLD: 35.5 MM HG (ref 35–48)
PCO2 TEMP ADJ BLD: 36.1 MM HG (ref 35–48)
PH BLDA: 7.31 PH UNITS (ref 7.35–7.45)
PH BLDA: 7.36 PH UNITS (ref 7.35–7.45)
PH BLDA: 7.36 PH UNITS (ref 7.35–7.45)
PH, TEMP CORRECTED: 7.31 PH UNITS
PH, TEMP CORRECTED: 7.36 PH UNITS
PH, TEMP CORRECTED: 7.36 PH UNITS
PLAT MORPH BLD: NORMAL
PLAT MORPH BLD: NORMAL
PLATELET # BLD AUTO: 252 10*3/MM3 (ref 140–450)
PLATELET # BLD AUTO: 256 10*3/MM3 (ref 140–450)
PMV BLD AUTO: 10.8 FL (ref 6–12)
PMV BLD AUTO: 10.9 FL (ref 6–12)
PO2 BLDA: 58 MM HG (ref 83–108)
PO2 BLDA: 63.4 MM HG (ref 83–108)
PO2 BLDA: 91.5 MM HG (ref 83–108)
PO2 TEMP ADJ BLD: 58 MM HG (ref 83–108)
PO2 TEMP ADJ BLD: 63.4 MM HG (ref 83–108)
PO2 TEMP ADJ BLD: 91.5 MM HG (ref 83–108)
POTASSIUM SERPL-SCNC: 4.3 MMOL/L (ref 3.5–5.2)
POTASSIUM SERPL-SCNC: 4.4 MMOL/L (ref 3.5–5.2)
POTASSIUM SERPL-SCNC: 5 MMOL/L (ref 3.5–5.2)
PROCALCITONIN SERPL-MCNC: 3.75 NG/ML (ref 0–0.25)
QT INTERVAL: 308 MS
QTC INTERVAL: 447 MS
RBC # BLD AUTO: 5.37 10*6/MM3 (ref 4.14–5.8)
RBC # BLD AUTO: 5.88 10*6/MM3 (ref 4.14–5.8)
RBC MORPH BLD: NORMAL
RBC MORPH BLD: NORMAL
SODIUM SERPL-SCNC: 133 MMOL/L (ref 136–145)
SODIUM SERPL-SCNC: 135 MMOL/L (ref 136–145)
SODIUM SERPL-SCNC: 140 MMOL/L (ref 136–145)
TOTAL RATE: 0 BREATHS/MINUTE
TROPONIN T SERPL-MCNC: <0.01 NG/ML (ref 0–0.03)
WBC # BLD AUTO: 10.23 10*3/MM3 (ref 3.4–10.8)
WBC # BLD AUTO: 11.75 10*3/MM3 (ref 3.4–10.8)
WBC MORPH BLD: NORMAL
WBC MORPH BLD: NORMAL

## 2021-01-21 PROCEDURE — 85025 COMPLETE CBC W/AUTO DIFF WBC: CPT | Performed by: NURSE PRACTITIONER

## 2021-01-21 PROCEDURE — 93010 ELECTROCARDIOGRAM REPORT: CPT | Performed by: INTERNAL MEDICINE

## 2021-01-21 PROCEDURE — 82375 ASSAY CARBOXYHB QUANT: CPT

## 2021-01-21 PROCEDURE — 83605 ASSAY OF LACTIC ACID: CPT | Performed by: NURSE PRACTITIONER

## 2021-01-21 PROCEDURE — 83880 ASSAY OF NATRIURETIC PEPTIDE: CPT | Performed by: FAMILY MEDICINE

## 2021-01-21 PROCEDURE — 74018 RADEX ABDOMEN 1 VIEW: CPT

## 2021-01-21 PROCEDURE — 71045 X-RAY EXAM CHEST 1 VIEW: CPT

## 2021-01-21 PROCEDURE — 83690 ASSAY OF LIPASE: CPT | Performed by: NURSE PRACTITIONER

## 2021-01-21 PROCEDURE — 71250 CT THORAX DX C-: CPT

## 2021-01-21 PROCEDURE — 80048 BASIC METABOLIC PNL TOTAL CA: CPT | Performed by: NURSE PRACTITIONER

## 2021-01-21 PROCEDURE — 25010000002 ENOXAPARIN PER 10 MG: Performed by: FAMILY MEDICINE

## 2021-01-21 PROCEDURE — 85007 BL SMEAR W/DIFF WBC COUNT: CPT | Performed by: NURSE PRACTITIONER

## 2021-01-21 PROCEDURE — 83880 ASSAY OF NATRIURETIC PEPTIDE: CPT | Performed by: NURSE PRACTITIONER

## 2021-01-21 PROCEDURE — 84145 PROCALCITONIN (PCT): CPT | Performed by: NURSE PRACTITIONER

## 2021-01-21 PROCEDURE — 82140 ASSAY OF AMMONIA: CPT | Performed by: NURSE PRACTITIONER

## 2021-01-21 PROCEDURE — 94799 UNLISTED PULMONARY SVC/PX: CPT

## 2021-01-21 PROCEDURE — 80048 BASIC METABOLIC PNL TOTAL CA: CPT | Performed by: FAMILY MEDICINE

## 2021-01-21 PROCEDURE — 94660 CPAP INITIATION&MGMT: CPT

## 2021-01-21 PROCEDURE — 36600 WITHDRAWAL OF ARTERIAL BLOOD: CPT

## 2021-01-21 PROCEDURE — 83735 ASSAY OF MAGNESIUM: CPT | Performed by: NURSE PRACTITIONER

## 2021-01-21 PROCEDURE — 84484 ASSAY OF TROPONIN QUANT: CPT | Performed by: NURSE PRACTITIONER

## 2021-01-21 PROCEDURE — 83050 HGB METHEMOGLOBIN QUAN: CPT

## 2021-01-21 PROCEDURE — 93005 ELECTROCARDIOGRAM TRACING: CPT | Performed by: NURSE PRACTITIONER

## 2021-01-21 PROCEDURE — 99233 SBSQ HOSP IP/OBS HIGH 50: CPT | Performed by: FAMILY MEDICINE

## 2021-01-21 PROCEDURE — 25010000002 LORAZEPAM PER 2 MG: Performed by: FAMILY MEDICINE

## 2021-01-21 PROCEDURE — 82805 BLOOD GASES W/O2 SATURATION: CPT

## 2021-01-21 PROCEDURE — 74176 CT ABD & PELVIS W/O CONTRAST: CPT

## 2021-01-21 RX ORDER — BUMETANIDE 0.25 MG/ML
1 INJECTION INTRAMUSCULAR; INTRAVENOUS ONCE
Status: DISCONTINUED | OUTPATIENT
Start: 2021-01-21 | End: 2021-01-23

## 2021-01-21 RX ORDER — DIAZEPAM 5 MG/1
5 TABLET ORAL EVERY 6 HOURS
Status: DISCONTINUED | OUTPATIENT
Start: 2021-01-22 | End: 2021-01-21

## 2021-01-21 RX ORDER — AMANTADINE HYDROCHLORIDE 100 MG/1
100 CAPSULE, GELATIN COATED ORAL EVERY 12 HOURS SCHEDULED
Status: DISCONTINUED | OUTPATIENT
Start: 2021-01-21 | End: 2021-01-25

## 2021-01-21 RX ORDER — DIAZEPAM 5 MG/1
10 TABLET ORAL EVERY 6 HOURS
Status: DISCONTINUED | OUTPATIENT
Start: 2021-01-21 | End: 2021-01-21

## 2021-01-21 RX ORDER — IPRATROPIUM BROMIDE AND ALBUTEROL SULFATE 2.5; .5 MG/3ML; MG/3ML
3 SOLUTION RESPIRATORY (INHALATION)
Status: DISCONTINUED | OUTPATIENT
Start: 2021-01-21 | End: 2021-01-22

## 2021-01-21 RX ORDER — BUMETANIDE 0.25 MG/ML
1 INJECTION INTRAMUSCULAR; INTRAVENOUS ONCE
Status: COMPLETED | OUTPATIENT
Start: 2021-01-21 | End: 2021-01-21

## 2021-01-21 RX ORDER — DICYCLOMINE HCL 20 MG
20 TABLET ORAL EVERY 8 HOURS PRN
Status: DISCONTINUED | OUTPATIENT
Start: 2021-01-21 | End: 2021-01-22

## 2021-01-21 RX ORDER — ESCITALOPRAM OXALATE 20 MG/1
20 TABLET ORAL DAILY
Status: DISCONTINUED | OUTPATIENT
Start: 2021-01-21 | End: 2021-01-25

## 2021-01-21 RX ORDER — CLONIDINE HYDROCHLORIDE 0.1 MG/1
0.1 TABLET ORAL EVERY 12 HOURS SCHEDULED
Status: DISCONTINUED | OUTPATIENT
Start: 2021-01-21 | End: 2021-01-25

## 2021-01-21 RX ADMIN — METOPROLOL TARTRATE 2.5 MG: 5 INJECTION INTRAVENOUS at 17:02

## 2021-01-21 RX ADMIN — CLONIDINE HYDROCHLORIDE 0.1 MG: 0.1 TABLET ORAL at 12:37

## 2021-01-21 RX ADMIN — LORAZEPAM 2 MG: 2 INJECTION INTRAMUSCULAR; INTRAVENOUS at 01:34

## 2021-01-21 RX ADMIN — LORAZEPAM 2 MG: 2 INJECTION INTRAMUSCULAR; INTRAVENOUS at 03:16

## 2021-01-21 RX ADMIN — SIMETHICONE 80 MG: 80 TABLET, CHEWABLE ORAL at 00:24

## 2021-01-21 RX ADMIN — LORAZEPAM 2 MG: 2 INJECTION INTRAMUSCULAR; INTRAVENOUS at 12:36

## 2021-01-21 RX ADMIN — SODIUM CHLORIDE, PRESERVATIVE FREE 10 ML: 5 INJECTION INTRAVENOUS at 08:57

## 2021-01-21 RX ADMIN — IPRATROPIUM BROMIDE AND ALBUTEROL SULFATE 3 ML: 2.5; .5 SOLUTION RESPIRATORY (INHALATION) at 02:44

## 2021-01-21 RX ADMIN — LORAZEPAM 2 MG: 2 INJECTION INTRAMUSCULAR; INTRAVENOUS at 20:52

## 2021-01-21 RX ADMIN — ENOXAPARIN SODIUM 40 MG: 40 INJECTION SUBCUTANEOUS at 08:55

## 2021-01-21 RX ADMIN — THIAMINE HCL TAB 100 MG 100 MG: 100 TAB at 08:56

## 2021-01-21 RX ADMIN — METOPROLOL TARTRATE 2.5 MG: 5 INJECTION INTRAVENOUS at 05:30

## 2021-01-21 RX ADMIN — HYDROCODONE BITARTRATE AND ACETAMINOPHEN 1 TABLET: 5; 325 TABLET ORAL at 08:55

## 2021-01-21 RX ADMIN — ESCITALOPRAM OXALATE 20 MG: 20 TABLET ORAL at 12:37

## 2021-01-21 RX ADMIN — METOPROLOL TARTRATE 25 MG: 25 TABLET, FILM COATED ORAL at 20:31

## 2021-01-21 RX ADMIN — FOLIC ACID 1 MG: 1 TABLET ORAL at 08:56

## 2021-01-21 RX ADMIN — IPRATROPIUM BROMIDE AND ALBUTEROL SULFATE 3 ML: 2.5; .5 SOLUTION RESPIRATORY (INHALATION) at 07:23

## 2021-01-21 RX ADMIN — IPRATROPIUM BROMIDE AND ALBUTEROL SULFATE 3 ML: 2.5; .5 SOLUTION RESPIRATORY (INHALATION) at 12:10

## 2021-01-21 RX ADMIN — PANTOPRAZOLE SODIUM 40 MG: 40 TABLET, DELAYED RELEASE ORAL at 05:31

## 2021-01-21 RX ADMIN — CLONIDINE HYDROCHLORIDE 0.1 MG: 0.1 TABLET ORAL at 20:28

## 2021-01-21 RX ADMIN — MULTIVITAMIN TABLET 1 TABLET: TABLET at 08:56

## 2021-01-21 RX ADMIN — AMANTADINE HYDROCHLORIDE 100 MG: 100 CAPSULE ORAL at 20:28

## 2021-01-21 RX ADMIN — BUMETANIDE 1 MG: 0.25 INJECTION, SOLUTION INTRAMUSCULAR; INTRAVENOUS at 12:37

## 2021-01-21 RX ADMIN — DIAZEPAM 5 MG: 5 TABLET ORAL at 09:00

## 2021-01-21 RX ADMIN — TRAMADOL HYDROCHLORIDE 100 MG: 50 TABLET, FILM COATED ORAL at 03:16

## 2021-01-21 RX ADMIN — LORAZEPAM 2 MG: 2 INJECTION INTRAMUSCULAR; INTRAVENOUS at 17:02

## 2021-01-21 RX ADMIN — DIAZEPAM 5 MG: 5 TABLET ORAL at 05:30

## 2021-01-21 RX ADMIN — METOPROLOL TARTRATE 25 MG: 25 TABLET, FILM COATED ORAL at 12:37

## 2021-01-21 RX ADMIN — LORAZEPAM 2 MG: 2 INJECTION INTRAMUSCULAR; INTRAVENOUS at 20:28

## 2021-01-21 RX ADMIN — SODIUM CHLORIDE, PRESERVATIVE FREE 10 ML: 5 INJECTION INTRAVENOUS at 20:29

## 2021-01-21 RX ADMIN — AMANTADINE HYDROCHLORIDE 100 MG: 100 CAPSULE ORAL at 14:39

## 2021-01-21 RX ADMIN — LORAZEPAM 2 MG: 2 INJECTION INTRAMUSCULAR; INTRAVENOUS at 16:35

## 2021-01-21 RX ADMIN — IPRATROPIUM BROMIDE AND ALBUTEROL SULFATE 3 ML: 2.5; .5 SOLUTION RESPIRATORY (INHALATION) at 18:55

## 2021-01-21 RX ADMIN — ENOXAPARIN SODIUM 40 MG: 40 INJECTION SUBCUTANEOUS at 20:30

## 2021-01-22 ENCOUNTER — APPOINTMENT (OUTPATIENT)
Dept: GENERAL RADIOLOGY | Facility: HOSPITAL | Age: 46
End: 2021-01-22

## 2021-01-22 ENCOUNTER — APPOINTMENT (OUTPATIENT)
Dept: CARDIOLOGY | Facility: HOSPITAL | Age: 46
End: 2021-01-22

## 2021-01-22 PROBLEM — E78.1 HYPERTRIGLYCERIDEMIA: Status: ACTIVE | Noted: 2021-01-22

## 2021-01-22 PROBLEM — E66.01 CLASS 3 SEVERE OBESITY IN ADULT (HCC): Chronic | Status: ACTIVE | Noted: 2021-01-21

## 2021-01-22 PROBLEM — I10 ESSENTIAL HYPERTENSION: Chronic | Status: ACTIVE | Noted: 2021-01-19

## 2021-01-22 PROBLEM — F10.10 ALCOHOL ABUSE: Chronic | Status: ACTIVE | Noted: 2021-01-19

## 2021-01-22 PROBLEM — R50.9 FEVER: Status: ACTIVE | Noted: 2021-01-22

## 2021-01-22 PROBLEM — K21.9 GERD WITHOUT ESOPHAGITIS: Chronic | Status: ACTIVE | Noted: 2021-01-19

## 2021-01-22 PROBLEM — J96.01 ACUTE RESPIRATORY FAILURE WITH HYPOXIA (HCC): Status: ACTIVE | Noted: 2021-01-22

## 2021-01-22 PROBLEM — K76.0 FATTY LIVER: Chronic | Status: ACTIVE | Noted: 2021-01-22

## 2021-01-22 PROBLEM — K76.0 FATTY LIVER: Status: ACTIVE | Noted: 2021-01-22

## 2021-01-22 PROBLEM — F10.939 ALCOHOL WITHDRAWAL (HCC): Status: ACTIVE | Noted: 2021-01-22

## 2021-01-22 LAB
ALBUMIN SERPL-MCNC: 3.3 G/DL (ref 3.5–5.2)
ALBUMIN/GLOB SERPL: 0.8 G/DL
ALP SERPL-CCNC: 101 U/L (ref 39–117)
ALT SERPL W P-5'-P-CCNC: 32 U/L (ref 1–41)
AMMONIA BLD-SCNC: 74 UMOL/L (ref 16–60)
ANION GAP SERPL CALCULATED.3IONS-SCNC: 18 MMOL/L (ref 5–15)
ASCENDING AORTA: 2.9 CM
AST SERPL-CCNC: 63 U/L (ref 1–40)
BASOPHILS # BLD MANUAL: 0.1 10*3/MM3 (ref 0–0.2)
BASOPHILS NFR BLD AUTO: 1 % (ref 0–1.5)
BH CV ECHO MEAS - AO MAX PG (FULL): 2.1 MMHG
BH CV ECHO MEAS - AO MAX PG: 5.2 MMHG
BH CV ECHO MEAS - AO MEAN PG (FULL): 1.7 MMHG
BH CV ECHO MEAS - AO MEAN PG: 3.2 MMHG
BH CV ECHO MEAS - AO ROOT AREA (BSA CORRECTED): 1.3
BH CV ECHO MEAS - AO ROOT AREA: 9.1 CM^2
BH CV ECHO MEAS - AO ROOT DIAM: 3.4 CM
BH CV ECHO MEAS - AO V2 MAX: 114.4 CM/SEC
BH CV ECHO MEAS - AO V2 MEAN: 81.1 CM/SEC
BH CV ECHO MEAS - AO V2 VTI: 21.4 CM
BH CV ECHO MEAS - ASC AORTA: 2.9 CM
BH CV ECHO MEAS - AVA(I,A): 3 CM^2
BH CV ECHO MEAS - AVA(I,D): 3 CM^2
BH CV ECHO MEAS - AVA(V,A): 3.1 CM^2
BH CV ECHO MEAS - AVA(V,D): 3.1 CM^2
BH CV ECHO MEAS - BSA(HAYCOCK): 2.8 M^2
BH CV ECHO MEAS - BSA: 2.7 M^2
BH CV ECHO MEAS - BZI_BMI: 42 KILOGRAMS/M^2
BH CV ECHO MEAS - BZI_METRIC_HEIGHT: 188 CM
BH CV ECHO MEAS - BZI_METRIC_WEIGHT: 148.3 KG
BH CV ECHO MEAS - EDV(CUBED): 97.6 ML
BH CV ECHO MEAS - EDV(MOD-SP2): 228 ML
BH CV ECHO MEAS - EDV(MOD-SP4): 194 ML
BH CV ECHO MEAS - EDV(TEICH): 97.5 ML
BH CV ECHO MEAS - EF(CUBED): 71.1 %
BH CV ECHO MEAS - EF(MOD-BP): 53 %
BH CV ECHO MEAS - EF(MOD-SP2): 54.8 %
BH CV ECHO MEAS - EF(MOD-SP4): 53.1 %
BH CV ECHO MEAS - EF(TEICH): 62.8 %
BH CV ECHO MEAS - ESV(CUBED): 28.2 ML
BH CV ECHO MEAS - ESV(MOD-SP2): 103 ML
BH CV ECHO MEAS - ESV(MOD-SP4): 91 ML
BH CV ECHO MEAS - ESV(TEICH): 36.2 ML
BH CV ECHO MEAS - FS: 33.9 %
BH CV ECHO MEAS - IVS/LVPW: 0.9
BH CV ECHO MEAS - IVSD: 1 CM
BH CV ECHO MEAS - LA DIMENSION: 3.5 CM
BH CV ECHO MEAS - LA/AO: 1
BH CV ECHO MEAS - LAD MAJOR: 5.2 CM
BH CV ECHO MEAS - LAT PEAK E' VEL: 8.4 CM/SEC
BH CV ECHO MEAS - LATERAL E/E' RATIO: 5.6
BH CV ECHO MEAS - LV DIASTOLIC VOL/BSA (35-75): 72.4 ML/M^2
BH CV ECHO MEAS - LV IVRT: 0.08 SEC
BH CV ECHO MEAS - LV MASS(C)D: 177.1 GRAMS
BH CV ECHO MEAS - LV MASS(C)DI: 66.1 GRAMS/M^2
BH CV ECHO MEAS - LV MAX PG: 3.2 MMHG
BH CV ECHO MEAS - LV MEAN PG: 1.6 MMHG
BH CV ECHO MEAS - LV SYSTOLIC VOL/BSA (12-30): 34 ML/M^2
BH CV ECHO MEAS - LV V1 MAX: 88.8 CM/SEC
BH CV ECHO MEAS - LV V1 MEAN: 56.6 CM/SEC
BH CV ECHO MEAS - LV V1 VTI: 15.9 CM
BH CV ECHO MEAS - LVIDD: 4.6 CM
BH CV ECHO MEAS - LVIDS: 3 CM
BH CV ECHO MEAS - LVLD AP2: 9.6 CM
BH CV ECHO MEAS - LVLD AP4: 9.2 CM
BH CV ECHO MEAS - LVLS AP2: 8.4 CM
BH CV ECHO MEAS - LVLS AP4: 7.8 CM
BH CV ECHO MEAS - LVOT AREA (M): 4.2 CM^2
BH CV ECHO MEAS - LVOT AREA: 4 CM^2
BH CV ECHO MEAS - LVOT DIAM: 2.3 CM
BH CV ECHO MEAS - LVPWD: 1.1 CM
BH CV ECHO MEAS - MED PEAK E' VEL: 6.6 CM/SEC
BH CV ECHO MEAS - MEDIAL E/E' RATIO: 7.2
BH CV ECHO MEAS - MV A MAX VEL: 60.5 CM/SEC
BH CV ECHO MEAS - MV DEC SLOPE: 259.5 CM/SEC^2
BH CV ECHO MEAS - MV DEC TIME: 0.2 SEC
BH CV ECHO MEAS - MV E MAX VEL: 48.6 CM/SEC
BH CV ECHO MEAS - MV E/A: 0.8
BH CV ECHO MEAS - MV P1/2T MAX VEL: 55.6 CM/SEC
BH CV ECHO MEAS - MV P1/2T: 62.8 MSEC
BH CV ECHO MEAS - MVA P1/2T LCG: 4 CM^2
BH CV ECHO MEAS - MVA(P1/2T): 3.5 CM^2
BH CV ECHO MEAS - PA ACC SLOPE: 518.3 CM/SEC^2
BH CV ECHO MEAS - PA ACC TIME: 0.15 SEC
BH CV ECHO MEAS - PA MAX PG: 5.3 MMHG
BH CV ECHO MEAS - PA PR(ACCEL): 10.9 MMHG
BH CV ECHO MEAS - PA V2 MAX: 115.4 CM/SEC
BH CV ECHO MEAS - SI(AO): 72.7 ML/M^2
BH CV ECHO MEAS - SI(CUBED): 25.9 ML/M^2
BH CV ECHO MEAS - SI(LVOT): 23.8 ML/M^2
BH CV ECHO MEAS - SI(MOD-SP2): 46.7 ML/M^2
BH CV ECHO MEAS - SI(MOD-SP4): 38.5 ML/M^2
BH CV ECHO MEAS - SI(TEICH): 22.9 ML/M^2
BH CV ECHO MEAS - SV(AO): 194.6 ML
BH CV ECHO MEAS - SV(CUBED): 69.4 ML
BH CV ECHO MEAS - SV(LVOT): 63.8 ML
BH CV ECHO MEAS - SV(MOD-SP2): 125 ML
BH CV ECHO MEAS - SV(MOD-SP4): 103 ML
BH CV ECHO MEAS - SV(TEICH): 61.3 ML
BH CV ECHO MEAS - TAPSE (>1.6): 2 CM
BH CV ECHO MEASUREMENTS AVERAGE E/E' RATIO: 6.48
BH CV VAS BP LEFT ARM: NORMAL MMHG
BH CV XLRA - RV BASE: 4.1 CM
BH CV XLRA - RV LENGTH: 7.9 CM
BH CV XLRA - RV MID: 2.4 CM
BH CV XLRA - TDI S': 11.5 CM/SEC
BILIRUB SERPL-MCNC: 0.5 MG/DL (ref 0–1.2)
BUN SERPL-MCNC: 34 MG/DL (ref 6–20)
BUN/CREAT SERPL: 19.2 (ref 7–25)
CALCIUM SPEC-SCNC: 8.8 MG/DL (ref 8.6–10.5)
CHLORIDE SERPL-SCNC: 110 MMOL/L (ref 98–107)
CHOLEST SERPL-MCNC: 137 MG/DL (ref 0–200)
CO2 SERPL-SCNC: 18 MMOL/L (ref 22–29)
CREAT SERPL-MCNC: 1.77 MG/DL (ref 0.76–1.27)
DEPRECATED RDW RBC AUTO: 53.1 FL (ref 37–54)
EOSINOPHIL # BLD MANUAL: 0.1 10*3/MM3 (ref 0–0.4)
EOSINOPHIL NFR BLD MANUAL: 1 % (ref 0.3–6.2)
ERYTHROCYTE [DISTWIDTH] IN BLOOD BY AUTOMATED COUNT: 15.8 % (ref 12.3–15.4)
GFR SERPL CREATININE-BSD FRML MDRD: 50 ML/MIN/1.73
GLOBULIN UR ELPH-MCNC: 4.1 GM/DL
GLUCOSE SERPL-MCNC: 101 MG/DL (ref 65–99)
HCT VFR BLD AUTO: 45.1 % (ref 37.5–51)
HDLC SERPL-MCNC: 17 MG/DL (ref 40–60)
HGB BLD-MCNC: 13.3 G/DL (ref 13–17.7)
LDLC SERPL CALC-MCNC: 33 MG/DL (ref 0–100)
LDLC/HDLC SERPL: -0.41 {RATIO}
LEFT ATRIUM VOLUME INDEX: 25.4 ML/M^2
LEFT ATRIUM VOLUME: 68 ML
LIPASE SERPL-CCNC: 89 U/L (ref 13–60)
LYMPHOCYTES # BLD MANUAL: 0.86 10*3/MM3 (ref 0.7–3.1)
LYMPHOCYTES NFR BLD MANUAL: 5 % (ref 5–12)
LYMPHOCYTES NFR BLD MANUAL: 9 % (ref 19.6–45.3)
MCH RBC QN AUTO: 27 PG (ref 26.6–33)
MCHC RBC AUTO-ENTMCNC: 29.5 G/DL (ref 31.5–35.7)
MCV RBC AUTO: 91.5 FL (ref 79–97)
MONOCYTES # BLD AUTO: 0.48 10*3/MM3 (ref 0.1–0.9)
NEUTROPHILS # BLD AUTO: 7.94 10*3/MM3 (ref 1.7–7)
NEUTROPHILS NFR BLD MANUAL: 76 % (ref 42.7–76)
NEUTS BAND NFR BLD MANUAL: 7 % (ref 0–5)
NRBC SPEC MANUAL: 1 /100 WBC (ref 0–0.2)
PLAT MORPH BLD: NORMAL
PLATELET # BLD AUTO: 253 10*3/MM3 (ref 140–450)
PMV BLD AUTO: 10.8 FL (ref 6–12)
POTASSIUM SERPL-SCNC: 5.1 MMOL/L (ref 3.5–5.2)
PROT SERPL-MCNC: 7.4 G/DL (ref 6–8.5)
RBC # BLD AUTO: 4.93 10*6/MM3 (ref 4.14–5.8)
RBC MORPH BLD: NORMAL
SODIUM SERPL-SCNC: 146 MMOL/L (ref 136–145)
TRIGL SERPL-MCNC: 635 MG/DL (ref 0–150)
VARIANT LYMPHS NFR BLD MANUAL: 1 % (ref 0–5)
VLDLC SERPL-MCNC: 87 MG/DL (ref 5–40)
WBC # BLD AUTO: 9.57 10*3/MM3 (ref 3.4–10.8)
WBC MORPH BLD: NORMAL

## 2021-01-22 PROCEDURE — 93306 TTE W/DOPPLER COMPLETE: CPT

## 2021-01-22 PROCEDURE — 80053 COMPREHEN METABOLIC PANEL: CPT | Performed by: FAMILY MEDICINE

## 2021-01-22 PROCEDURE — 94799 UNLISTED PULMONARY SVC/PX: CPT

## 2021-01-22 PROCEDURE — 25010000002 LORAZEPAM PER 2 MG: Performed by: INTERNAL MEDICINE

## 2021-01-22 PROCEDURE — 82140 ASSAY OF AMMONIA: CPT | Performed by: NURSE PRACTITIONER

## 2021-01-22 PROCEDURE — 99292 CRITICAL CARE ADDL 30 MIN: CPT | Performed by: INTERNAL MEDICINE

## 2021-01-22 PROCEDURE — 25010000002 LORAZEPAM PER 2 MG: Performed by: FAMILY MEDICINE

## 2021-01-22 PROCEDURE — 25010000002 THIAMINE PER 100 MG: Performed by: INTERNAL MEDICINE

## 2021-01-22 PROCEDURE — 93306 TTE W/DOPPLER COMPLETE: CPT | Performed by: INTERNAL MEDICINE

## 2021-01-22 PROCEDURE — 87040 BLOOD CULTURE FOR BACTERIA: CPT | Performed by: INTERNAL MEDICINE

## 2021-01-22 PROCEDURE — 85007 BL SMEAR W/DIFF WBC COUNT: CPT | Performed by: INTERNAL MEDICINE

## 2021-01-22 PROCEDURE — 25010000002 MEROPENEM PER 100 MG: Performed by: INTERNAL MEDICINE

## 2021-01-22 PROCEDURE — 99291 CRITICAL CARE FIRST HOUR: CPT | Performed by: INTERNAL MEDICINE

## 2021-01-22 PROCEDURE — 74018 RADEX ABDOMEN 1 VIEW: CPT

## 2021-01-22 PROCEDURE — 80061 LIPID PANEL: CPT | Performed by: NURSE PRACTITIONER

## 2021-01-22 PROCEDURE — 94660 CPAP INITIATION&MGMT: CPT

## 2021-01-22 PROCEDURE — 25010000002 DIAZEPAM PER 5 MG: Performed by: INTERNAL MEDICINE

## 2021-01-22 PROCEDURE — 83690 ASSAY OF LIPASE: CPT | Performed by: INTERNAL MEDICINE

## 2021-01-22 PROCEDURE — 25010000002 ENOXAPARIN PER 10 MG: Performed by: FAMILY MEDICINE

## 2021-01-22 PROCEDURE — 85025 COMPLETE CBC W/AUTO DIFF WBC: CPT | Performed by: INTERNAL MEDICINE

## 2021-01-22 RX ORDER — IPRATROPIUM BROMIDE AND ALBUTEROL SULFATE 2.5; .5 MG/3ML; MG/3ML
3 SOLUTION RESPIRATORY (INHALATION)
Status: DISCONTINUED | OUTPATIENT
Start: 2021-01-22 | End: 2021-01-23

## 2021-01-22 RX ORDER — IPRATROPIUM BROMIDE AND ALBUTEROL SULFATE 2.5; .5 MG/3ML; MG/3ML
3 SOLUTION RESPIRATORY (INHALATION) ONCE
Status: COMPLETED | OUTPATIENT
Start: 2021-01-22 | End: 2021-01-22

## 2021-01-22 RX ORDER — DIAZEPAM 5 MG/ML
10 INJECTION, SOLUTION INTRAMUSCULAR; INTRAVENOUS ONCE
Status: COMPLETED | OUTPATIENT
Start: 2021-01-22 | End: 2021-01-22

## 2021-01-22 RX ORDER — LORAZEPAM 2 MG/ML
4 INJECTION INTRAMUSCULAR
Status: DISCONTINUED | OUTPATIENT
Start: 2021-01-22 | End: 2021-01-24

## 2021-01-22 RX ORDER — PANTOPRAZOLE SODIUM 40 MG/10ML
40 INJECTION, POWDER, LYOPHILIZED, FOR SOLUTION INTRAVENOUS
Status: DISCONTINUED | OUTPATIENT
Start: 2021-01-22 | End: 2021-02-10

## 2021-01-22 RX ORDER — LORAZEPAM 2 MG/ML
2 INJECTION INTRAMUSCULAR
Status: DISCONTINUED | OUTPATIENT
Start: 2021-01-22 | End: 2021-01-24

## 2021-01-22 RX ORDER — LORAZEPAM 2 MG/ML
1 INJECTION INTRAMUSCULAR
Status: DISCONTINUED | OUTPATIENT
Start: 2021-01-22 | End: 2021-01-24

## 2021-01-22 RX ORDER — DEXTROSE, SODIUM CHLORIDE, SODIUM LACTATE, POTASSIUM CHLORIDE, AND CALCIUM CHLORIDE 5; .6; .31; .03; .02 G/100ML; G/100ML; G/100ML; G/100ML; G/100ML
100 INJECTION, SOLUTION INTRAVENOUS CONTINUOUS
Status: DISCONTINUED | OUTPATIENT
Start: 2021-01-22 | End: 2021-01-24

## 2021-01-22 RX ORDER — DEXMEDETOMIDINE HYDROCHLORIDE 4 UG/ML
.2-1.5 INJECTION, SOLUTION INTRAVENOUS
Status: DISCONTINUED | OUTPATIENT
Start: 2021-01-22 | End: 2021-01-24

## 2021-01-22 RX ADMIN — DEXMEDETOMIDINE HYDROCHLORIDE 1 MCG/KG/HR: 4 INJECTION, SOLUTION INTRAVENOUS at 18:01

## 2021-01-22 RX ADMIN — LORAZEPAM 2 MG: 2 INJECTION INTRAMUSCULAR; INTRAVENOUS at 16:26

## 2021-01-22 RX ADMIN — SODIUM CHLORIDE, SODIUM LACTATE, POTASSIUM CHLORIDE, CALCIUM CHLORIDE AND DEXTROSE MONOHYDRATE 100 ML/HR: 5; 600; 310; 30; 20 INJECTION, SOLUTION INTRAVENOUS at 20:51

## 2021-01-22 RX ADMIN — LORAZEPAM 2 MG: 2 INJECTION INTRAMUSCULAR; INTRAVENOUS at 11:42

## 2021-01-22 RX ADMIN — DIAZEPAM 10 MG: 5 INJECTION, SOLUTION INTRAMUSCULAR; INTRAVENOUS at 02:12

## 2021-01-22 RX ADMIN — AMANTADINE HYDROCHLORIDE 100 MG: 100 CAPSULE ORAL at 08:47

## 2021-01-22 RX ADMIN — ENOXAPARIN SODIUM 40 MG: 40 INJECTION SUBCUTANEOUS at 20:41

## 2021-01-22 RX ADMIN — MEROPENEM 2 G: 1 INJECTION, POWDER, FOR SOLUTION INTRAVENOUS at 16:46

## 2021-01-22 RX ADMIN — DEXMEDETOMIDINE HYDROCHLORIDE 0.2 MCG/KG/HR: 4 INJECTION, SOLUTION INTRAVENOUS at 01:50

## 2021-01-22 RX ADMIN — THIAMINE HYDROCHLORIDE 500 MG: 100 INJECTION, SOLUTION INTRAMUSCULAR; INTRAVENOUS at 21:32

## 2021-01-22 RX ADMIN — ACETAMINOPHEN ORAL SOLUTION 650 MG: 650 SOLUTION ORAL at 12:14

## 2021-01-22 RX ADMIN — THIAMINE HCL TAB 100 MG 100 MG: 100 TAB at 08:13

## 2021-01-22 RX ADMIN — LORAZEPAM 2 MG: 2 INJECTION INTRAMUSCULAR; INTRAVENOUS at 22:03

## 2021-01-22 RX ADMIN — LORAZEPAM 2 MG: 2 INJECTION INTRAMUSCULAR; INTRAVENOUS at 17:01

## 2021-01-22 RX ADMIN — IPRATROPIUM BROMIDE AND ALBUTEROL SULFATE 3 ML: 2.5; .5 SOLUTION RESPIRATORY (INHALATION) at 00:40

## 2021-01-22 RX ADMIN — ESCITALOPRAM OXALATE 20 MG: 20 TABLET ORAL at 08:13

## 2021-01-22 RX ADMIN — LORAZEPAM 2 MG: 2 INJECTION INTRAMUSCULAR; INTRAVENOUS at 17:05

## 2021-01-22 RX ADMIN — LORAZEPAM 2 MG: 2 INJECTION INTRAMUSCULAR; INTRAVENOUS at 04:02

## 2021-01-22 RX ADMIN — PANTOPRAZOLE SODIUM 40 MG: 40 INJECTION, POWDER, LYOPHILIZED, FOR SOLUTION INTRAVENOUS at 06:13

## 2021-01-22 RX ADMIN — DEXMEDETOMIDINE HYDROCHLORIDE 0.6 MCG/KG/HR: 4 INJECTION, SOLUTION INTRAVENOUS at 13:06

## 2021-01-22 RX ADMIN — LORAZEPAM 2 MG: 2 INJECTION INTRAMUSCULAR; INTRAVENOUS at 21:47

## 2021-01-22 RX ADMIN — LORAZEPAM 2 MG: 2 INJECTION INTRAMUSCULAR; INTRAVENOUS at 16:50

## 2021-01-22 RX ADMIN — CLONIDINE HYDROCHLORIDE 0.1 MG: 0.1 TABLET ORAL at 08:13

## 2021-01-22 RX ADMIN — LORAZEPAM 2 MG: 2 INJECTION INTRAMUSCULAR; INTRAVENOUS at 04:30

## 2021-01-22 RX ADMIN — MULTIVITAMIN TABLET 1 TABLET: TABLET at 08:13

## 2021-01-22 RX ADMIN — SODIUM CHLORIDE, PRESERVATIVE FREE 10 ML: 5 INJECTION INTRAVENOUS at 08:14

## 2021-01-22 RX ADMIN — DEXMEDETOMIDINE HYDROCHLORIDE 1 MCG/KG/HR: 4 INJECTION, SOLUTION INTRAVENOUS at 20:41

## 2021-01-22 RX ADMIN — LORAZEPAM 2 MG: 2 INJECTION INTRAMUSCULAR; INTRAVENOUS at 17:02

## 2021-01-22 RX ADMIN — SODIUM CHLORIDE, SODIUM LACTATE, POTASSIUM CHLORIDE, CALCIUM CHLORIDE AND DEXTROSE MONOHYDRATE 100 ML/HR: 5; 600; 310; 30; 20 INJECTION, SOLUTION INTRAVENOUS at 09:31

## 2021-01-22 RX ADMIN — METOPROLOL TARTRATE 25 MG: 25 TABLET, FILM COATED ORAL at 20:41

## 2021-01-22 RX ADMIN — DEXMEDETOMIDINE HYDROCHLORIDE 1 MCG/KG/HR: 4 INJECTION, SOLUTION INTRAVENOUS at 06:51

## 2021-01-22 RX ADMIN — THIAMINE HYDROCHLORIDE 500 MG: 100 INJECTION, SOLUTION INTRAMUSCULAR; INTRAVENOUS at 14:54

## 2021-01-22 RX ADMIN — LORAZEPAM 2 MG: 2 INJECTION INTRAMUSCULAR; INTRAVENOUS at 00:11

## 2021-01-22 RX ADMIN — LORAZEPAM 2 MG: 2 INJECTION INTRAMUSCULAR; INTRAVENOUS at 21:32

## 2021-01-22 RX ADMIN — ACETAMINOPHEN 650 MG: 650 SUPPOSITORY RECTAL at 04:10

## 2021-01-22 RX ADMIN — LORAZEPAM 4 MG: 2 INJECTION INTRAMUSCULAR; INTRAVENOUS at 16:38

## 2021-01-22 RX ADMIN — DEXMEDETOMIDINE HYDROCHLORIDE 1.5 MCG/KG/HR: 4 INJECTION, SOLUTION INTRAVENOUS at 22:02

## 2021-01-22 RX ADMIN — FOLIC ACID 1 MG: 1 TABLET ORAL at 08:13

## 2021-01-22 RX ADMIN — DEXMEDETOMIDINE HYDROCHLORIDE 0.9 MCG/KG/HR: 4 INJECTION, SOLUTION INTRAVENOUS at 09:31

## 2021-01-22 RX ADMIN — ENOXAPARIN SODIUM 40 MG: 40 INJECTION SUBCUTANEOUS at 08:14

## 2021-01-22 RX ADMIN — METOPROLOL TARTRATE 25 MG: 25 TABLET, FILM COATED ORAL at 08:13

## 2021-01-22 RX ADMIN — IPRATROPIUM BROMIDE AND ALBUTEROL SULFATE 3 ML: 2.5; .5 SOLUTION RESPIRATORY (INHALATION) at 07:07

## 2021-01-22 RX ADMIN — DEXMEDETOMIDINE HYDROCHLORIDE 1.5 MCG/KG/HR: 4 INJECTION, SOLUTION INTRAVENOUS at 05:15

## 2021-01-23 ENCOUNTER — APPOINTMENT (OUTPATIENT)
Dept: GENERAL RADIOLOGY | Facility: HOSPITAL | Age: 46
End: 2021-01-23

## 2021-01-23 LAB
ALBUMIN SERPL-MCNC: 2.9 G/DL (ref 3.5–5.2)
ALBUMIN/GLOB SERPL: 0.7 G/DL
ALP SERPL-CCNC: 191 U/L (ref 39–117)
ALT SERPL W P-5'-P-CCNC: 25 U/L (ref 1–41)
ANION GAP SERPL CALCULATED.3IONS-SCNC: 13 MMOL/L (ref 5–15)
ARTERIAL PATENCY WRIST A: ABNORMAL
ARTERIAL PATENCY WRIST A: ABNORMAL
AST SERPL-CCNC: 34 U/L (ref 1–40)
ATMOSPHERIC PRESS: ABNORMAL MM[HG]
ATMOSPHERIC PRESS: ABNORMAL MM[HG]
BASE EXCESS BLDA CALC-SCNC: -1.5 MMOL/L (ref 0–2)
BASE EXCESS BLDA CALC-SCNC: 3.2 MMOL/L (ref 0–2)
BASOPHILS # BLD MANUAL: 0.08 10*3/MM3 (ref 0–0.2)
BASOPHILS NFR BLD AUTO: 1 % (ref 0–1.5)
BDY SITE: ABNORMAL
BDY SITE: ABNORMAL
BILIRUB SERPL-MCNC: 0.4 MG/DL (ref 0–1.2)
BODY TEMPERATURE: 37 C
BODY TEMPERATURE: 37 C
BUN SERPL-MCNC: 21 MG/DL (ref 6–20)
BUN/CREAT SERPL: 18.1 (ref 7–25)
CALCIUM SPEC-SCNC: 9 MG/DL (ref 8.6–10.5)
CHLORIDE SERPL-SCNC: 111 MMOL/L (ref 98–107)
CLUMPED PLATELETS: PRESENT
CO2 BLDA-SCNC: 27.3 MMOL/L (ref 22–33)
CO2 BLDA-SCNC: 32.4 MMOL/L (ref 22–33)
CO2 SERPL-SCNC: 23 MMOL/L (ref 22–29)
COHGB MFR BLD: 0.8 % (ref 0–2)
COHGB MFR BLD: 0.9 % (ref 0–2)
CREAT SERPL-MCNC: 1.16 MG/DL (ref 0.76–1.27)
DEPRECATED RDW RBC AUTO: 53 FL (ref 37–54)
DOHLE BODIES: PRESENT
EOSINOPHIL # BLD MANUAL: 0.08 10*3/MM3 (ref 0–0.4)
EOSINOPHIL NFR BLD MANUAL: 1 % (ref 0.3–6.2)
EPAP: 0
EPAP: 6
ERYTHROCYTE [DISTWIDTH] IN BLOOD BY AUTOMATED COUNT: 15.8 % (ref 12.3–15.4)
GFR SERPL CREATININE-BSD FRML MDRD: 82 ML/MIN/1.73
GLOBULIN UR ELPH-MCNC: 3.9 GM/DL
GLUCOSE SERPL-MCNC: 110 MG/DL (ref 65–99)
HCO3 BLDA-SCNC: 25.7 MMOL/L (ref 20–26)
HCO3 BLDA-SCNC: 30.6 MMOL/L (ref 20–26)
HCT VFR BLD AUTO: 43.4 % (ref 37.5–51)
HCT VFR BLD CALC: 40.3 %
HCT VFR BLD CALC: 41.3 %
HGB BLD-MCNC: 12.8 G/DL (ref 13–17.7)
HGB BLDA-MCNC: 13.1 G/DL (ref 13.5–17.5)
HGB BLDA-MCNC: 13.5 G/DL (ref 13.5–17.5)
INHALED O2 CONCENTRATION: 50 %
INHALED O2 CONCENTRATION: 50 %
IPAP: 0
IPAP: 18
LARGE PLATELETS: ABNORMAL
LYMPHOCYTES # BLD MANUAL: 0.56 10*3/MM3 (ref 0.7–3.1)
LYMPHOCYTES NFR BLD MANUAL: 6 % (ref 5–12)
LYMPHOCYTES NFR BLD MANUAL: 7 % (ref 19.6–45.3)
MAGNESIUM SERPL-MCNC: 2.7 MG/DL (ref 1.6–2.6)
MCH RBC QN AUTO: 26.8 PG (ref 26.6–33)
MCHC RBC AUTO-ENTMCNC: 29.5 G/DL (ref 31.5–35.7)
MCV RBC AUTO: 91 FL (ref 79–97)
METHGB BLD QL: 0.6 % (ref 0–1.5)
METHGB BLD QL: 0.6 % (ref 0–1.5)
MODALITY: ABNORMAL
MODALITY: ABNORMAL
MONOCYTES # BLD AUTO: 0.48 10*3/MM3 (ref 0.1–0.9)
MRSA DNA SPEC QL NAA+PROBE: NEGATIVE
NEUTROPHILS # BLD AUTO: 6.85 10*3/MM3 (ref 1.7–7)
NEUTROPHILS NFR BLD MANUAL: 72 % (ref 42.7–76)
NEUTS BAND NFR BLD MANUAL: 13 % (ref 0–5)
NOTE: ABNORMAL
NOTE: ABNORMAL
NRBC SPEC MANUAL: 2 /100 WBC (ref 0–0.2)
OXYHGB MFR BLDV: 96.6 % (ref 94–99)
OXYHGB MFR BLDV: 96.6 % (ref 94–99)
PAW @ PEAK INSP FLOW SETTING VENT: 0 CMH2O
PCO2 BLDA: 52.7 MM HG (ref 35–45)
PCO2 BLDA: 57.9 MM HG (ref 35–45)
PCO2 TEMP ADJ BLD: 52.7 MM HG (ref 35–48)
PCO2 TEMP ADJ BLD: 57.9 MM HG (ref 35–48)
PH BLDA: 7.3 PH UNITS (ref 7.35–7.45)
PH BLDA: 7.33 PH UNITS (ref 7.35–7.45)
PH, TEMP CORRECTED: 7.3 PH UNITS
PH, TEMP CORRECTED: 7.33 PH UNITS
PHOSPHATE SERPL-MCNC: 2.5 MG/DL (ref 2.5–4.5)
PLATELET # BLD AUTO: 242 10*3/MM3 (ref 140–450)
PMV BLD AUTO: 10.7 FL (ref 6–12)
PO2 BLDA: 94.9 MM HG (ref 83–108)
PO2 BLDA: 96.2 MM HG (ref 83–108)
PO2 TEMP ADJ BLD: 94.9 MM HG (ref 83–108)
PO2 TEMP ADJ BLD: 96.2 MM HG (ref 83–108)
POTASSIUM SERPL-SCNC: 4.9 MMOL/L (ref 3.5–5.2)
PROT SERPL-MCNC: 6.8 G/DL (ref 6–8.5)
QT INTERVAL: 314 MS
QTC INTERVAL: 458 MS
RBC # BLD AUTO: 4.77 10*6/MM3 (ref 4.14–5.8)
RBC MORPH BLD: NORMAL
SET MECH RESP RATE: 10
SODIUM SERPL-SCNC: 147 MMOL/L (ref 136–145)
TOTAL RATE: 0 BREATHS/MINUTE
TOTAL RATE: 33 BREATHS/MINUTE
VENTILATOR MODE: ABNORMAL
WBC # BLD AUTO: 8.06 10*3/MM3 (ref 3.4–10.8)

## 2021-01-23 PROCEDURE — 99291 CRITICAL CARE FIRST HOUR: CPT | Performed by: INTERNAL MEDICINE

## 2021-01-23 PROCEDURE — 94660 CPAP INITIATION&MGMT: CPT

## 2021-01-23 PROCEDURE — 36600 WITHDRAWAL OF ARTERIAL BLOOD: CPT

## 2021-01-23 PROCEDURE — 85007 BL SMEAR W/DIFF WBC COUNT: CPT | Performed by: INTERNAL MEDICINE

## 2021-01-23 PROCEDURE — 25010000002 THIAMINE PER 100 MG: Performed by: INTERNAL MEDICINE

## 2021-01-23 PROCEDURE — C1894 INTRO/SHEATH, NON-LASER: HCPCS

## 2021-01-23 PROCEDURE — 82375 ASSAY CARBOXYHB QUANT: CPT

## 2021-01-23 PROCEDURE — 84100 ASSAY OF PHOSPHORUS: CPT | Performed by: INTERNAL MEDICINE

## 2021-01-23 PROCEDURE — 82805 BLOOD GASES W/O2 SATURATION: CPT

## 2021-01-23 PROCEDURE — 87205 SMEAR GRAM STAIN: CPT

## 2021-01-23 PROCEDURE — 71045 X-RAY EXAM CHEST 1 VIEW: CPT

## 2021-01-23 PROCEDURE — 94799 UNLISTED PULMONARY SVC/PX: CPT

## 2021-01-23 PROCEDURE — 83735 ASSAY OF MAGNESIUM: CPT | Performed by: INTERNAL MEDICINE

## 2021-01-23 PROCEDURE — 02HV33Z INSERTION OF INFUSION DEVICE INTO SUPERIOR VENA CAVA, PERCUTANEOUS APPROACH: ICD-10-PCS | Performed by: INTERNAL MEDICINE

## 2021-01-23 PROCEDURE — 25010000002 ENOXAPARIN PER 10 MG: Performed by: FAMILY MEDICINE

## 2021-01-23 PROCEDURE — 25010000002 VANCOMYCIN 10 G RECONSTITUTED SOLUTION

## 2021-01-23 PROCEDURE — 85025 COMPLETE CBC W/AUTO DIFF WBC: CPT | Performed by: INTERNAL MEDICINE

## 2021-01-23 PROCEDURE — 25010000002 FUROSEMIDE PER 20 MG: Performed by: INTERNAL MEDICINE

## 2021-01-23 PROCEDURE — 80053 COMPREHEN METABOLIC PANEL: CPT | Performed by: INTERNAL MEDICINE

## 2021-01-23 PROCEDURE — 25010000002 LORAZEPAM PER 2 MG: Performed by: INTERNAL MEDICINE

## 2021-01-23 PROCEDURE — C1751 CATH, INF, PER/CENT/MIDLINE: HCPCS

## 2021-01-23 PROCEDURE — 87070 CULTURE OTHR SPECIMN AEROBIC: CPT

## 2021-01-23 PROCEDURE — 83050 HGB METHEMOGLOBIN QUAN: CPT

## 2021-01-23 PROCEDURE — 87641 MR-STAPH DNA AMP PROBE: CPT

## 2021-01-23 PROCEDURE — 25010000002 MEROPENEM PER 100 MG: Performed by: INTERNAL MEDICINE

## 2021-01-23 RX ORDER — SODIUM CHLORIDE 0.9 % (FLUSH) 0.9 %
10 SYRINGE (ML) INJECTION AS NEEDED
Status: DISCONTINUED | OUTPATIENT
Start: 2021-01-23 | End: 2021-02-11 | Stop reason: HOSPADM

## 2021-01-23 RX ORDER — IPRATROPIUM BROMIDE AND ALBUTEROL SULFATE 2.5; .5 MG/3ML; MG/3ML
3 SOLUTION RESPIRATORY (INHALATION)
Status: DISCONTINUED | OUTPATIENT
Start: 2021-01-23 | End: 2021-02-11 | Stop reason: HOSPADM

## 2021-01-23 RX ORDER — SODIUM CHLORIDE 0.9 % (FLUSH) 0.9 %
10 SYRINGE (ML) INJECTION EVERY 12 HOURS SCHEDULED
Status: DISCONTINUED | OUTPATIENT
Start: 2021-01-23 | End: 2021-02-11 | Stop reason: HOSPADM

## 2021-01-23 RX ORDER — ACETAMINOPHEN 160 MG/5ML
650 SOLUTION ORAL EVERY 6 HOURS PRN
Status: DISCONTINUED | OUTPATIENT
Start: 2021-01-23 | End: 2021-02-11

## 2021-01-23 RX ORDER — FUROSEMIDE 10 MG/ML
40 INJECTION INTRAMUSCULAR; INTRAVENOUS ONCE
Status: COMPLETED | OUTPATIENT
Start: 2021-01-23 | End: 2021-01-23

## 2021-01-23 RX ADMIN — FOLIC ACID 1 MG: 5 INJECTION, SOLUTION INTRAMUSCULAR; INTRAVENOUS; SUBCUTANEOUS at 09:30

## 2021-01-23 RX ADMIN — ACETAMINOPHEN ORAL SOLUTION 650 MG: 650 SOLUTION ORAL at 09:58

## 2021-01-23 RX ADMIN — METOPROLOL TARTRATE 25 MG: 25 TABLET, FILM COATED ORAL at 09:30

## 2021-01-23 RX ADMIN — IPRATROPIUM BROMIDE AND ALBUTEROL SULFATE 3 ML: 2.5; .5 SOLUTION RESPIRATORY (INHALATION) at 20:15

## 2021-01-23 RX ADMIN — ENOXAPARIN SODIUM 40 MG: 40 INJECTION SUBCUTANEOUS at 20:23

## 2021-01-23 RX ADMIN — ACETAMINOPHEN ORAL SOLUTION 650 MG: 650 SOLUTION ORAL at 16:19

## 2021-01-23 RX ADMIN — LORAZEPAM 2 MG: 2 INJECTION INTRAMUSCULAR; INTRAVENOUS at 18:42

## 2021-01-23 RX ADMIN — DEXMEDETOMIDINE HYDROCHLORIDE 1.5 MCG/KG/HR: 4 INJECTION, SOLUTION INTRAVENOUS at 11:47

## 2021-01-23 RX ADMIN — DEXMEDETOMIDINE HYDROCHLORIDE 1.5 MCG/KG/HR: 4 INJECTION, SOLUTION INTRAVENOUS at 09:58

## 2021-01-23 RX ADMIN — FUROSEMIDE 40 MG: 10 INJECTION INTRAMUSCULAR; INTRAVENOUS at 13:42

## 2021-01-23 RX ADMIN — IPRATROPIUM BROMIDE AND ALBUTEROL SULFATE 3 ML: 2.5; .5 SOLUTION RESPIRATORY (INHALATION) at 08:55

## 2021-01-23 RX ADMIN — DEXMEDETOMIDINE HYDROCHLORIDE 1.5 MCG/KG/HR: 4 INJECTION, SOLUTION INTRAVENOUS at 06:13

## 2021-01-23 RX ADMIN — LORAZEPAM 2 MG: 2 INJECTION INTRAMUSCULAR; INTRAVENOUS at 13:14

## 2021-01-23 RX ADMIN — DEXMEDETOMIDINE HYDROCHLORIDE 1.5 MCG/KG/HR: 4 INJECTION, SOLUTION INTRAVENOUS at 16:13

## 2021-01-23 RX ADMIN — LORAZEPAM 2 MG: 2 INJECTION INTRAMUSCULAR; INTRAVENOUS at 19:09

## 2021-01-23 RX ADMIN — METOPROLOL TARTRATE 25 MG: 25 TABLET, FILM COATED ORAL at 20:23

## 2021-01-23 RX ADMIN — DEXMEDETOMIDINE HYDROCHLORIDE 1.5 MCG/KG/HR: 4 INJECTION, SOLUTION INTRAVENOUS at 22:36

## 2021-01-23 RX ADMIN — AMANTADINE HYDROCHLORIDE 100 MG: 100 CAPSULE ORAL at 09:30

## 2021-01-23 RX ADMIN — DEXMEDETOMIDINE HYDROCHLORIDE 1.5 MCG/KG/HR: 4 INJECTION, SOLUTION INTRAVENOUS at 20:54

## 2021-01-23 RX ADMIN — LORAZEPAM 2 MG: 2 INJECTION INTRAMUSCULAR; INTRAVENOUS at 23:22

## 2021-01-23 RX ADMIN — THIAMINE HYDROCHLORIDE 500 MG: 100 INJECTION, SOLUTION INTRAMUSCULAR; INTRAVENOUS at 05:30

## 2021-01-23 RX ADMIN — SODIUM CHLORIDE, SODIUM LACTATE, POTASSIUM CHLORIDE, CALCIUM CHLORIDE AND DEXTROSE MONOHYDRATE 100 ML/HR: 5; 600; 310; 30; 20 INJECTION, SOLUTION INTRAVENOUS at 13:42

## 2021-01-23 RX ADMIN — MEROPENEM 2 G: 1 INJECTION, POWDER, FOR SOLUTION INTRAVENOUS at 16:13

## 2021-01-23 RX ADMIN — LORAZEPAM 2 MG: 2 INJECTION INTRAMUSCULAR; INTRAVENOUS at 00:29

## 2021-01-23 RX ADMIN — LORAZEPAM 2 MG: 2 INJECTION INTRAMUSCULAR; INTRAVENOUS at 14:03

## 2021-01-23 RX ADMIN — LORAZEPAM 2 MG: 2 INJECTION INTRAMUSCULAR; INTRAVENOUS at 10:27

## 2021-01-23 RX ADMIN — DEXMEDETOMIDINE HYDROCHLORIDE 1.5 MCG/KG/HR: 4 INJECTION, SOLUTION INTRAVENOUS at 13:42

## 2021-01-23 RX ADMIN — CLONIDINE HYDROCHLORIDE 0.1 MG: 0.1 TABLET ORAL at 00:08

## 2021-01-23 RX ADMIN — MEROPENEM 2 G: 1 INJECTION, POWDER, FOR SOLUTION INTRAVENOUS at 23:21

## 2021-01-23 RX ADMIN — ESCITALOPRAM OXALATE 20 MG: 20 TABLET ORAL at 09:30

## 2021-01-23 RX ADMIN — PANTOPRAZOLE SODIUM 40 MG: 40 INJECTION, POWDER, LYOPHILIZED, FOR SOLUTION INTRAVENOUS at 05:30

## 2021-01-23 RX ADMIN — MEROPENEM 2 G: 1 INJECTION, POWDER, FOR SOLUTION INTRAVENOUS at 00:32

## 2021-01-23 RX ADMIN — LORAZEPAM 2 MG: 2 INJECTION INTRAMUSCULAR; INTRAVENOUS at 17:22

## 2021-01-23 RX ADMIN — IPRATROPIUM BROMIDE AND ALBUTEROL SULFATE 3 ML: 2.5; .5 SOLUTION RESPIRATORY (INHALATION) at 15:19

## 2021-01-23 RX ADMIN — DEXMEDETOMIDINE HYDROCHLORIDE 1.5 MCG/KG/HR: 4 INJECTION, SOLUTION INTRAVENOUS at 17:32

## 2021-01-23 RX ADMIN — ENOXAPARIN SODIUM 40 MG: 40 INJECTION SUBCUTANEOUS at 09:29

## 2021-01-23 RX ADMIN — LORAZEPAM 2 MG: 2 INJECTION INTRAMUSCULAR; INTRAVENOUS at 10:54

## 2021-01-23 RX ADMIN — CLONIDINE HYDROCHLORIDE 0.1 MG: 0.1 TABLET ORAL at 09:30

## 2021-01-23 RX ADMIN — SODIUM CHLORIDE, PRESERVATIVE FREE 10 ML: 5 INJECTION INTRAVENOUS at 20:23

## 2021-01-23 RX ADMIN — DEXMEDETOMIDINE HYDROCHLORIDE 1.5 MCG/KG/HR: 4 INJECTION, SOLUTION INTRAVENOUS at 00:36

## 2021-01-23 RX ADMIN — AMANTADINE HYDROCHLORIDE 100 MG: 100 CAPSULE ORAL at 20:23

## 2021-01-23 RX ADMIN — CLONIDINE HYDROCHLORIDE 0.1 MG: 0.1 TABLET ORAL at 20:23

## 2021-01-23 RX ADMIN — DEXMEDETOMIDINE HYDROCHLORIDE 1.5 MCG/KG/HR: 4 INJECTION, SOLUTION INTRAVENOUS at 19:09

## 2021-01-23 RX ADMIN — VANCOMYCIN HYDROCHLORIDE 1250 MG: 10 INJECTION, POWDER, LYOPHILIZED, FOR SOLUTION INTRAVENOUS at 23:22

## 2021-01-23 RX ADMIN — LORAZEPAM 2 MG: 2 INJECTION INTRAMUSCULAR; INTRAVENOUS at 06:13

## 2021-01-23 RX ADMIN — THIAMINE HYDROCHLORIDE 500 MG: 100 INJECTION, SOLUTION INTRAMUSCULAR; INTRAVENOUS at 22:36

## 2021-01-23 RX ADMIN — LORAZEPAM 2 MG: 2 INJECTION INTRAMUSCULAR; INTRAVENOUS at 11:47

## 2021-01-23 RX ADMIN — DEXMEDETOMIDINE HYDROCHLORIDE 1.5 MCG/KG/HR: 4 INJECTION, SOLUTION INTRAVENOUS at 04:24

## 2021-01-23 RX ADMIN — AMANTADINE HYDROCHLORIDE 100 MG: 100 CAPSULE ORAL at 00:08

## 2021-01-23 RX ADMIN — LORAZEPAM 2 MG: 2 INJECTION INTRAMUSCULAR; INTRAVENOUS at 05:30

## 2021-01-23 RX ADMIN — DEXMEDETOMIDINE HYDROCHLORIDE 1.5 MCG/KG/HR: 4 INJECTION, SOLUTION INTRAVENOUS at 09:29

## 2021-01-23 RX ADMIN — LORAZEPAM 2 MG: 2 INJECTION INTRAMUSCULAR; INTRAVENOUS at 20:54

## 2021-01-23 RX ADMIN — VANCOMYCIN HYDROCHLORIDE 2500 MG: 100 INJECTION, POWDER, LYOPHILIZED, FOR SOLUTION INTRAVENOUS at 16:13

## 2021-01-23 RX ADMIN — THIAMINE HYDROCHLORIDE 500 MG: 100 INJECTION, SOLUTION INTRAMUSCULAR; INTRAVENOUS at 13:43

## 2021-01-23 RX ADMIN — LORAZEPAM 4 MG: 2 INJECTION INTRAMUSCULAR; INTRAVENOUS at 09:56

## 2021-01-23 RX ADMIN — LORAZEPAM 2 MG: 2 INJECTION INTRAMUSCULAR; INTRAVENOUS at 23:37

## 2021-01-23 RX ADMIN — DEXMEDETOMIDINE HYDROCHLORIDE 1.5 MCG/KG/HR: 4 INJECTION, SOLUTION INTRAVENOUS at 02:20

## 2021-01-23 RX ADMIN — MEROPENEM 2 G: 1 INJECTION, POWDER, FOR SOLUTION INTRAVENOUS at 09:30

## 2021-01-23 RX ADMIN — LORAZEPAM 2 MG: 2 INJECTION INTRAMUSCULAR; INTRAVENOUS at 13:42

## 2021-01-24 ENCOUNTER — APPOINTMENT (OUTPATIENT)
Dept: GENERAL RADIOLOGY | Facility: HOSPITAL | Age: 46
End: 2021-01-24

## 2021-01-24 LAB
ALBUMIN SERPL-MCNC: 2.7 G/DL (ref 3.5–5.2)
ALBUMIN/GLOB SERPL: 0.7 G/DL
ALP SERPL-CCNC: 91 U/L (ref 39–117)
ALT SERPL W P-5'-P-CCNC: 19 U/L (ref 1–41)
ANION GAP SERPL CALCULATED.3IONS-SCNC: 9 MMOL/L (ref 5–15)
ARTERIAL PATENCY WRIST A: ABNORMAL
AST SERPL-CCNC: 31 U/L (ref 1–40)
ATMOSPHERIC PRESS: ABNORMAL MM[HG]
BASE EXCESS BLDA CALC-SCNC: 6.1 MMOL/L (ref 0–2)
BASOPHILS # BLD AUTO: 0.08 10*3/MM3 (ref 0–0.2)
BASOPHILS NFR BLD AUTO: 1.1 % (ref 0–1.5)
BDY SITE: ABNORMAL
BILIRUB SERPL-MCNC: 0.3 MG/DL (ref 0–1.2)
BODY TEMPERATURE: 37 C
BUN SERPL-MCNC: 14 MG/DL (ref 6–20)
BUN/CREAT SERPL: 14.1 (ref 7–25)
CALCIUM SPEC-SCNC: 9.2 MG/DL (ref 8.6–10.5)
CHLORIDE SERPL-SCNC: 111 MMOL/L (ref 98–107)
CO2 BLDA-SCNC: 31.9 MMOL/L (ref 22–33)
CO2 SERPL-SCNC: 29 MMOL/L (ref 22–29)
COHGB MFR BLD: 0.5 % (ref 0–2)
CREAT SERPL-MCNC: 0.99 MG/DL (ref 0.76–1.27)
DEPRECATED RDW RBC AUTO: 54.2 FL (ref 37–54)
EOSINOPHIL # BLD AUTO: 0.09 10*3/MM3 (ref 0–0.4)
EOSINOPHIL NFR BLD AUTO: 1.2 % (ref 0.3–6.2)
ERYTHROCYTE [DISTWIDTH] IN BLOOD BY AUTOMATED COUNT: 15.9 % (ref 12.3–15.4)
GFR SERPL CREATININE-BSD FRML MDRD: 99 ML/MIN/1.73
GLOBULIN UR ELPH-MCNC: 4 GM/DL
GLUCOSE SERPL-MCNC: 131 MG/DL (ref 65–99)
HCO3 BLDA-SCNC: 30.6 MMOL/L (ref 20–26)
HCT VFR BLD AUTO: 45.8 % (ref 37.5–51)
HCT VFR BLD CALC: 40.4 %
HGB BLD-MCNC: 13.3 G/DL (ref 13–17.7)
HGB BLDA-MCNC: 13.2 G/DL (ref 13.5–17.5)
IMM GRANULOCYTES # BLD AUTO: 0.36 10*3/MM3 (ref 0–0.05)
IMM GRANULOCYTES NFR BLD AUTO: 4.8 % (ref 0–0.5)
INHALED O2 CONCENTRATION: 100 %
LYMPHOCYTES # BLD AUTO: 0.96 10*3/MM3 (ref 0.7–3.1)
LYMPHOCYTES NFR BLD AUTO: 12.8 % (ref 19.6–45.3)
MAGNESIUM SERPL-MCNC: 2.6 MG/DL (ref 1.6–2.6)
MCH RBC QN AUTO: 26.9 PG (ref 26.6–33)
MCHC RBC AUTO-ENTMCNC: 29 G/DL (ref 31.5–35.7)
MCV RBC AUTO: 92.5 FL (ref 79–97)
METHGB BLD QL: 0.6 % (ref 0–1.5)
MODALITY: ABNORMAL
MONOCYTES # BLD AUTO: 0.96 10*3/MM3 (ref 0.1–0.9)
MONOCYTES NFR BLD AUTO: 12.8 % (ref 5–12)
NEUTROPHILS NFR BLD AUTO: 5.03 10*3/MM3 (ref 1.7–7)
NEUTROPHILS NFR BLD AUTO: 67.3 % (ref 42.7–76)
NOTE: ABNORMAL
NRBC BLD AUTO-RTO: 1.6 /100 WBC (ref 0–0.2)
OXYHGB MFR BLDV: 98.9 % (ref 94–99)
PCO2 BLDA: 42.4 MM HG (ref 35–45)
PCO2 TEMP ADJ BLD: 42.4 MM HG (ref 35–48)
PEEP RESPIRATORY: 10 CM[H2O]
PH BLDA: 7.47 PH UNITS (ref 7.35–7.45)
PH, TEMP CORRECTED: 7.47 PH UNITS
PHOSPHATE SERPL-MCNC: 1.4 MG/DL (ref 2.5–4.5)
PLAT MORPH BLD: NORMAL
PLATELET # BLD AUTO: 287 10*3/MM3 (ref 140–450)
PMV BLD AUTO: 11.1 FL (ref 6–12)
PO2 BLDA: 209 MM HG (ref 83–108)
PO2 TEMP ADJ BLD: 209 MM HG (ref 83–108)
POTASSIUM SERPL-SCNC: 4.5 MMOL/L (ref 3.5–5.2)
PROT SERPL-MCNC: 6.7 G/DL (ref 6–8.5)
RBC # BLD AUTO: 4.95 10*6/MM3 (ref 4.14–5.8)
RBC MORPH BLD: NORMAL
SODIUM SERPL-SCNC: 149 MMOL/L (ref 136–145)
VENTILATOR MODE: ABNORMAL
WBC # BLD AUTO: 7.48 10*3/MM3 (ref 3.4–10.8)
WBC MORPH BLD: NORMAL

## 2021-01-24 PROCEDURE — 85007 BL SMEAR W/DIFF WBC COUNT: CPT | Performed by: INTERNAL MEDICINE

## 2021-01-24 PROCEDURE — 85025 COMPLETE CBC W/AUTO DIFF WBC: CPT | Performed by: INTERNAL MEDICINE

## 2021-01-24 PROCEDURE — 87205 SMEAR GRAM STAIN: CPT | Performed by: NURSE PRACTITIONER

## 2021-01-24 PROCEDURE — 25010000002 ENOXAPARIN PER 10 MG: Performed by: FAMILY MEDICINE

## 2021-01-24 PROCEDURE — 82805 BLOOD GASES W/O2 SATURATION: CPT

## 2021-01-24 PROCEDURE — 94799 UNLISTED PULMONARY SVC/PX: CPT

## 2021-01-24 PROCEDURE — 80053 COMPREHEN METABOLIC PANEL: CPT | Performed by: INTERNAL MEDICINE

## 2021-01-24 PROCEDURE — 25010000002 MEROPENEM PER 100 MG: Performed by: INTERNAL MEDICINE

## 2021-01-24 PROCEDURE — 87070 CULTURE OTHR SPECIMN AEROBIC: CPT | Performed by: NURSE PRACTITIONER

## 2021-01-24 PROCEDURE — 99291 CRITICAL CARE FIRST HOUR: CPT | Performed by: INTERNAL MEDICINE

## 2021-01-24 PROCEDURE — 31500 INSERT EMERGENCY AIRWAY: CPT

## 2021-01-24 PROCEDURE — 0BH17EZ INSERTION OF ENDOTRACHEAL AIRWAY INTO TRACHEA, VIA NATURAL OR ARTIFICIAL OPENING: ICD-10-PCS | Performed by: NURSE PRACTITIONER

## 2021-01-24 PROCEDURE — 25010000002 LORAZEPAM PER 2 MG: Performed by: INTERNAL MEDICINE

## 2021-01-24 PROCEDURE — 36600 WITHDRAWAL OF ARTERIAL BLOOD: CPT

## 2021-01-24 PROCEDURE — 84100 ASSAY OF PHOSPHORUS: CPT

## 2021-01-24 PROCEDURE — 25010000002 HYDRALAZINE PER 20 MG: Performed by: FAMILY MEDICINE

## 2021-01-24 PROCEDURE — 5A1955Z RESPIRATORY VENTILATION, GREATER THAN 96 CONSECUTIVE HOURS: ICD-10-PCS | Performed by: NURSE PRACTITIONER

## 2021-01-24 PROCEDURE — 71045 X-RAY EXAM CHEST 1 VIEW: CPT

## 2021-01-24 PROCEDURE — 25010000002 THIAMINE PER 100 MG: Performed by: INTERNAL MEDICINE

## 2021-01-24 PROCEDURE — 83050 HGB METHEMOGLOBIN QUAN: CPT

## 2021-01-24 PROCEDURE — 25010000002 FENTANYL CITRATE (PF) 2500 MCG/50ML SOLUTION: Performed by: INTERNAL MEDICINE

## 2021-01-24 PROCEDURE — 25010000002 VANCOMYCIN 10 G RECONSTITUTED SOLUTION

## 2021-01-24 PROCEDURE — 94003 VENT MGMT INPAT SUBQ DAY: CPT

## 2021-01-24 PROCEDURE — 83735 ASSAY OF MAGNESIUM: CPT | Performed by: INTERNAL MEDICINE

## 2021-01-24 PROCEDURE — 94002 VENT MGMT INPAT INIT DAY: CPT

## 2021-01-24 PROCEDURE — 82375 ASSAY CARBOXYHB QUANT: CPT

## 2021-01-24 RX ORDER — IPRATROPIUM BROMIDE AND ALBUTEROL SULFATE 2.5; .5 MG/3ML; MG/3ML
3 SOLUTION RESPIRATORY (INHALATION) EVERY 4 HOURS PRN
Status: DISCONTINUED | OUTPATIENT
Start: 2021-01-24 | End: 2021-02-11 | Stop reason: HOSPADM

## 2021-01-24 RX ORDER — LORAZEPAM 2 MG/ML
4 INJECTION INTRAMUSCULAR ONCE
Status: DISCONTINUED | OUTPATIENT
Start: 2021-01-24 | End: 2021-01-25

## 2021-01-24 RX ORDER — MIDAZOLAM IN NACL,ISO-OSMOT/PF 50 MG/50ML
INFUSION BOTTLE (ML) INTRAVENOUS
Status: COMPLETED
Start: 2021-01-24 | End: 2021-01-24

## 2021-01-24 RX ORDER — ACETYLCYSTEINE 200 MG/ML
4 SOLUTION ORAL; RESPIRATORY (INHALATION)
Status: DISPENSED | OUTPATIENT
Start: 2021-01-24 | End: 2021-01-25

## 2021-01-24 RX ORDER — SODIUM CHLORIDE 450 MG/100ML
50 INJECTION, SOLUTION INTRAVENOUS CONTINUOUS
Status: DISCONTINUED | OUTPATIENT
Start: 2021-01-24 | End: 2021-01-25

## 2021-01-24 RX ORDER — ETOMIDATE 2 MG/ML
40 INJECTION INTRAVENOUS ONCE
Status: COMPLETED | OUTPATIENT
Start: 2021-01-24 | End: 2021-01-24

## 2021-01-24 RX ORDER — CHLORHEXIDINE GLUCONATE 0.12 MG/ML
15 RINSE ORAL EVERY 12 HOURS SCHEDULED
Status: DISCONTINUED | OUTPATIENT
Start: 2021-01-24 | End: 2021-02-01

## 2021-01-24 RX ORDER — MIDAZOLAM IN NACL,ISO-OSMOT/PF 50 MG/50ML
1-10 INFUSION BOTTLE (ML) INTRAVENOUS
Status: DISCONTINUED | OUTPATIENT
Start: 2021-01-24 | End: 2021-01-27

## 2021-01-24 RX ADMIN — PANTOPRAZOLE SODIUM 40 MG: 40 INJECTION, POWDER, LYOPHILIZED, FOR SOLUTION INTRAVENOUS at 05:01

## 2021-01-24 RX ADMIN — THIAMINE HYDROCHLORIDE 500 MG: 100 INJECTION, SOLUTION INTRAMUSCULAR; INTRAVENOUS at 05:01

## 2021-01-24 RX ADMIN — MEROPENEM 2 G: 1 INJECTION, POWDER, FOR SOLUTION INTRAVENOUS at 23:47

## 2021-01-24 RX ADMIN — ENOXAPARIN SODIUM 40 MG: 40 INJECTION SUBCUTANEOUS at 09:47

## 2021-01-24 RX ADMIN — FOLIC ACID 1 MG: 5 INJECTION, SOLUTION INTRAMUSCULAR; INTRAVENOUS; SUBCUTANEOUS at 09:46

## 2021-01-24 RX ADMIN — ACETAMINOPHEN ORAL SOLUTION 649.6 MG: 650 SOLUTION ORAL at 05:02

## 2021-01-24 RX ADMIN — SODIUM CHLORIDE, SODIUM LACTATE, POTASSIUM CHLORIDE, CALCIUM CHLORIDE AND DEXTROSE MONOHYDRATE 100 ML/HR: 5; 600; 310; 30; 20 INJECTION, SOLUTION INTRAVENOUS at 09:51

## 2021-01-24 RX ADMIN — CHLORHEXIDINE GLUCONATE 0.12% ORAL RINSE 15 ML: 1.2 LIQUID ORAL at 12:33

## 2021-01-24 RX ADMIN — ENOXAPARIN SODIUM 40 MG: 40 INJECTION SUBCUTANEOUS at 21:01

## 2021-01-24 RX ADMIN — ACETYLCYSTEINE 4 ML: 200 SOLUTION ORAL; RESPIRATORY (INHALATION) at 18:51

## 2021-01-24 RX ADMIN — LORAZEPAM 2 MG: 2 INJECTION INTRAMUSCULAR; INTRAVENOUS at 03:08

## 2021-01-24 RX ADMIN — ESCITALOPRAM OXALATE 20 MG: 20 TABLET ORAL at 09:47

## 2021-01-24 RX ADMIN — METOPROLOL TARTRATE 25 MG: 25 TABLET, FILM COATED ORAL at 09:47

## 2021-01-24 RX ADMIN — ACETYLCYSTEINE 4 ML: 200 SOLUTION ORAL; RESPIRATORY (INHALATION) at 23:24

## 2021-01-24 RX ADMIN — ACETYLCYSTEINE 4 ML: 200 SOLUTION ORAL; RESPIRATORY (INHALATION) at 11:40

## 2021-01-24 RX ADMIN — LORAZEPAM 2 MG: 2 INJECTION INTRAMUSCULAR; INTRAVENOUS at 01:08

## 2021-01-24 RX ADMIN — AMANTADINE HYDROCHLORIDE 100 MG: 100 CAPSULE ORAL at 09:10

## 2021-01-24 RX ADMIN — FENTANYL CITRATE 300 MCG/HR: 0.05 INJECTION, SOLUTION INTRAMUSCULAR; INTRAVENOUS at 20:39

## 2021-01-24 RX ADMIN — SODIUM CHLORIDE, SODIUM LACTATE, POTASSIUM CHLORIDE, CALCIUM CHLORIDE AND DEXTROSE MONOHYDRATE 100 ML/HR: 5; 600; 310; 30; 20 INJECTION, SOLUTION INTRAVENOUS at 00:05

## 2021-01-24 RX ADMIN — CLONIDINE HYDROCHLORIDE 0.1 MG: 0.1 TABLET ORAL at 09:47

## 2021-01-24 RX ADMIN — IPRATROPIUM BROMIDE AND ALBUTEROL SULFATE 3 ML: 2.5; .5 SOLUTION RESPIRATORY (INHALATION) at 07:20

## 2021-01-24 RX ADMIN — THIAMINE HYDROCHLORIDE 500 MG: 100 INJECTION, SOLUTION INTRAMUSCULAR; INTRAVENOUS at 21:02

## 2021-01-24 RX ADMIN — CHLORHEXIDINE GLUCONATE 0.12% ORAL RINSE 15 ML: 1.2 LIQUID ORAL at 21:00

## 2021-01-24 RX ADMIN — DEXMEDETOMIDINE HYDROCHLORIDE 1.5 MCG/KG/HR: 4 INJECTION, SOLUTION INTRAVENOUS at 03:02

## 2021-01-24 RX ADMIN — IPRATROPIUM BROMIDE AND ALBUTEROL SULFATE 3 ML: 2.5; .5 SOLUTION RESPIRATORY (INHALATION) at 15:40

## 2021-01-24 RX ADMIN — ACETYLCYSTEINE 4 ML: 200 SOLUTION ORAL; RESPIRATORY (INHALATION) at 07:20

## 2021-01-24 RX ADMIN — ACETAMINOPHEN ORAL SOLUTION 650 MG: 650 SOLUTION ORAL at 12:31

## 2021-01-24 RX ADMIN — DEXMEDETOMIDINE HYDROCHLORIDE 1.5 MCG/KG/HR: 4 INJECTION, SOLUTION INTRAVENOUS at 09:46

## 2021-01-24 RX ADMIN — ACETYLCYSTEINE 4 ML: 200 SOLUTION ORAL; RESPIRATORY (INHALATION) at 15:40

## 2021-01-24 RX ADMIN — LORAZEPAM 2 MG: 2 INJECTION INTRAMUSCULAR; INTRAVENOUS at 00:08

## 2021-01-24 RX ADMIN — LORAZEPAM 2 MG: 2 INJECTION INTRAMUSCULAR; INTRAVENOUS at 00:32

## 2021-01-24 RX ADMIN — SODIUM CHLORIDE 50 ML/HR: 4.5 INJECTION, SOLUTION INTRAVENOUS at 21:00

## 2021-01-24 RX ADMIN — IPRATROPIUM BROMIDE AND ALBUTEROL SULFATE 3 ML: 2.5; .5 SOLUTION RESPIRATORY (INHALATION) at 11:40

## 2021-01-24 RX ADMIN — LORAZEPAM 2 MG: 2 INJECTION INTRAMUSCULAR; INTRAVENOUS at 05:48

## 2021-01-24 RX ADMIN — HYDRALAZINE HYDROCHLORIDE 10 MG: 20 INJECTION INTRAMUSCULAR; INTRAVENOUS at 01:08

## 2021-01-24 RX ADMIN — DEXMEDETOMIDINE HYDROCHLORIDE 1.5 MCG/KG/HR: 4 INJECTION, SOLUTION INTRAVENOUS at 06:40

## 2021-01-24 RX ADMIN — ACETAMINOPHEN ORAL SOLUTION 650 MG: 650 SOLUTION ORAL at 17:52

## 2021-01-24 RX ADMIN — MEROPENEM 2 G: 1 INJECTION, POWDER, FOR SOLUTION INTRAVENOUS at 09:46

## 2021-01-24 RX ADMIN — ETOMIDATE 40 MG: 2 INJECTION, SOLUTION INTRAVENOUS at 01:40

## 2021-01-24 RX ADMIN — CLONIDINE HYDROCHLORIDE 0.1 MG: 0.1 TABLET ORAL at 21:00

## 2021-01-24 RX ADMIN — Medication 2 MG/HR: at 05:43

## 2021-01-24 RX ADMIN — Medication 10 MG/HR: at 23:59

## 2021-01-24 RX ADMIN — METOPROLOL TARTRATE 25 MG: 25 TABLET, FILM COATED ORAL at 21:00

## 2021-01-24 RX ADMIN — DEXMEDETOMIDINE HYDROCHLORIDE 1.5 MCG/KG/HR: 4 INJECTION, SOLUTION INTRAVENOUS at 04:49

## 2021-01-24 RX ADMIN — FENTANYL CITRATE 200 MCG/HR: 0.05 INJECTION, SOLUTION INTRAMUSCULAR; INTRAVENOUS at 12:41

## 2021-01-24 RX ADMIN — AMANTADINE HYDROCHLORIDE 100 MG: 100 CAPSULE ORAL at 21:00

## 2021-01-24 RX ADMIN — MEROPENEM 2 G: 1 INJECTION, POWDER, FOR SOLUTION INTRAVENOUS at 16:19

## 2021-01-24 RX ADMIN — IPRATROPIUM BROMIDE AND ALBUTEROL SULFATE 3 ML: 2.5; .5 SOLUTION RESPIRATORY (INHALATION) at 23:24

## 2021-01-24 RX ADMIN — THIAMINE HYDROCHLORIDE 500 MG: 100 INJECTION, SOLUTION INTRAMUSCULAR; INTRAVENOUS at 14:57

## 2021-01-24 RX ADMIN — SODIUM PHOSPHATE, MONOBASIC, MONOHYDRATE AND SODIUM PHOSPHATE, DIBASIC, ANHYDROUS 30 MMOL: 276; 142 INJECTION, SOLUTION INTRAVENOUS at 12:31

## 2021-01-24 RX ADMIN — CHLORHEXIDINE GLUCONATE 0.12% ORAL RINSE 15 ML: 1.2 LIQUID ORAL at 03:08

## 2021-01-24 RX ADMIN — VANCOMYCIN HYDROCHLORIDE 1250 MG: 10 INJECTION, POWDER, LYOPHILIZED, FOR SOLUTION INTRAVENOUS at 12:32

## 2021-01-24 RX ADMIN — DEXMEDETOMIDINE HYDROCHLORIDE 1.5 MCG/KG/HR: 4 INJECTION, SOLUTION INTRAVENOUS at 00:05

## 2021-01-24 RX ADMIN — Medication 10 MG/HR: at 14:57

## 2021-01-24 RX ADMIN — SODIUM CHLORIDE, PRESERVATIVE FREE 10 ML: 5 INJECTION INTRAVENOUS at 21:03

## 2021-01-24 RX ADMIN — IPRATROPIUM BROMIDE AND ALBUTEROL SULFATE 3 ML: 2.5; .5 SOLUTION RESPIRATORY (INHALATION) at 18:51

## 2021-01-25 ENCOUNTER — APPOINTMENT (OUTPATIENT)
Dept: GENERAL RADIOLOGY | Facility: HOSPITAL | Age: 46
End: 2021-01-25

## 2021-01-25 LAB
ALBUMIN SERPL-MCNC: 2.5 G/DL (ref 3.5–5.2)
ALBUMIN/GLOB SERPL: 0.7 G/DL
ALP SERPL-CCNC: 74 U/L (ref 39–117)
ALT SERPL W P-5'-P-CCNC: 12 U/L (ref 1–41)
AMMONIA BLD-SCNC: 45 UMOL/L (ref 16–60)
ANION GAP SERPL CALCULATED.3IONS-SCNC: 12 MMOL/L (ref 5–15)
ARTERIAL PATENCY WRIST A: ABNORMAL
AST SERPL-CCNC: 20 U/L (ref 1–40)
ATMOSPHERIC PRESS: ABNORMAL MM[HG]
BACTERIA SPEC AEROBE CULT: NORMAL
BACTERIA SPEC AEROBE CULT: NORMAL
BACTERIA SPEC RESP CULT: NORMAL
BASE EXCESS BLDA CALC-SCNC: 6.1 MMOL/L (ref 0–2)
BASOPHILS # BLD MANUAL: 0 10*3/MM3 (ref 0–0.2)
BASOPHILS NFR BLD AUTO: 0 % (ref 0–1.5)
BDY SITE: ABNORMAL
BILIRUB SERPL-MCNC: 0.4 MG/DL (ref 0–1.2)
BODY TEMPERATURE: 37 C
BUN SERPL-MCNC: 15 MG/DL (ref 6–20)
BUN/CREAT SERPL: 13.9 (ref 7–25)
CALCIUM SPEC-SCNC: 8.6 MG/DL (ref 8.6–10.5)
CHLORIDE SERPL-SCNC: 114 MMOL/L (ref 98–107)
CO2 BLDA-SCNC: 29.9 MMOL/L (ref 22–33)
CO2 SERPL-SCNC: 27 MMOL/L (ref 22–29)
COHGB MFR BLD: 0.7 % (ref 0–2)
CREAT SERPL-MCNC: 1.08 MG/DL (ref 0.76–1.27)
DEPRECATED RDW RBC AUTO: 50.6 FL (ref 37–54)
EOSINOPHIL # BLD MANUAL: 0.31 10*3/MM3 (ref 0–0.4)
EOSINOPHIL NFR BLD MANUAL: 3 % (ref 0.3–6.2)
EPAP: 0
ERYTHROCYTE [DISTWIDTH] IN BLOOD BY AUTOMATED COUNT: 15.6 % (ref 12.3–15.4)
GFR SERPL CREATININE-BSD FRML MDRD: 89 ML/MIN/1.73
GLOBULIN UR ELPH-MCNC: 3.5 GM/DL
GLUCOSE BLDC GLUCOMTR-MCNC: 110 MG/DL (ref 70–130)
GLUCOSE BLDC GLUCOMTR-MCNC: 95 MG/DL (ref 70–130)
GLUCOSE SERPL-MCNC: 88 MG/DL (ref 65–99)
GRAM STN SPEC: NORMAL
HCO3 BLDA-SCNC: 28.9 MMOL/L (ref 20–26)
HCT VFR BLD AUTO: 41.1 % (ref 37.5–51)
HCT VFR BLD CALC: 36.8 %
HGB BLD-MCNC: 12 G/DL (ref 13–17.7)
HGB BLDA-MCNC: 12 G/DL (ref 13.5–17.5)
INHALED O2 CONCENTRATION: 40 %
IPAP: 0
LYMPHOCYTES # BLD MANUAL: 1.24 10*3/MM3 (ref 0.7–3.1)
LYMPHOCYTES NFR BLD MANUAL: 12 % (ref 19.6–45.3)
LYMPHOCYTES NFR BLD MANUAL: 8 % (ref 5–12)
MAGNESIUM SERPL-MCNC: 2.3 MG/DL (ref 1.6–2.6)
MCH RBC QN AUTO: 25.9 PG (ref 26.6–33)
MCHC RBC AUTO-ENTMCNC: 29.2 G/DL (ref 31.5–35.7)
MCV RBC AUTO: 88.6 FL (ref 79–97)
METAMYELOCYTES NFR BLD MANUAL: 2 % (ref 0–0)
METHGB BLD QL: 0.3 % (ref 0–1.5)
MODALITY: ABNORMAL
MONOCYTES # BLD AUTO: 0.82 10*3/MM3 (ref 0.1–0.9)
NEUTROPHILS # BLD AUTO: 7.73 10*3/MM3 (ref 1.7–7)
NEUTROPHILS NFR BLD MANUAL: 59 % (ref 42.7–76)
NEUTS BAND NFR BLD MANUAL: 16 % (ref 0–5)
NOTE: ABNORMAL
NRBC SPEC MANUAL: 2 /100 WBC (ref 0–0.2)
OXYHGB MFR BLDV: 97.3 % (ref 94–99)
PAW @ PEAK INSP FLOW SETTING VENT: 0 CMH2O
PCO2 BLDA: 34.7 MM HG (ref 35–45)
PCO2 TEMP ADJ BLD: 34.7 MM HG (ref 35–48)
PH BLDA: 7.53 PH UNITS (ref 7.35–7.45)
PH, TEMP CORRECTED: 7.53 PH UNITS
PHOSPHATE SERPL-MCNC: 0.5 MG/DL (ref 2.5–4.5)
PHOSPHATE SERPL-MCNC: 2.3 MG/DL (ref 2.5–4.5)
PLAT MORPH BLD: NORMAL
PLATELET # BLD AUTO: 283 10*3/MM3 (ref 140–450)
PMV BLD AUTO: 11.1 FL (ref 6–12)
PO2 BLDA: 80.9 MM HG (ref 83–108)
PO2 TEMP ADJ BLD: 80.9 MM HG (ref 83–108)
POTASSIUM SERPL-SCNC: 3 MMOL/L (ref 3.5–5.2)
POTASSIUM SERPL-SCNC: 4.3 MMOL/L (ref 3.5–5.2)
PROT SERPL-MCNC: 6 G/DL (ref 6–8.5)
RBC # BLD AUTO: 4.64 10*6/MM3 (ref 4.14–5.8)
RBC MORPH BLD: NORMAL
SODIUM SERPL-SCNC: 153 MMOL/L (ref 136–145)
TOTAL RATE: 0 BREATHS/MINUTE
WBC # BLD AUTO: 10.31 10*3/MM3 (ref 3.4–10.8)
WBC MORPH BLD: NORMAL

## 2021-01-25 PROCEDURE — 82805 BLOOD GASES W/O2 SATURATION: CPT

## 2021-01-25 PROCEDURE — 25010000002 FENTANYL CITRATE (PF) 2500 MCG/50ML SOLUTION: Performed by: INTERNAL MEDICINE

## 2021-01-25 PROCEDURE — 71045 X-RAY EXAM CHEST 1 VIEW: CPT

## 2021-01-25 PROCEDURE — 84100 ASSAY OF PHOSPHORUS: CPT | Performed by: INTERNAL MEDICINE

## 2021-01-25 PROCEDURE — 85007 BL SMEAR W/DIFF WBC COUNT: CPT | Performed by: INTERNAL MEDICINE

## 2021-01-25 PROCEDURE — 83735 ASSAY OF MAGNESIUM: CPT | Performed by: INTERNAL MEDICINE

## 2021-01-25 PROCEDURE — 25010000002 ENOXAPARIN PER 10 MG: Performed by: FAMILY MEDICINE

## 2021-01-25 PROCEDURE — 25010000002 MEROPENEM PER 100 MG: Performed by: INTERNAL MEDICINE

## 2021-01-25 PROCEDURE — 36600 WITHDRAWAL OF ARTERIAL BLOOD: CPT

## 2021-01-25 PROCEDURE — 94799 UNLISTED PULMONARY SVC/PX: CPT

## 2021-01-25 PROCEDURE — 83050 HGB METHEMOGLOBIN QUAN: CPT

## 2021-01-25 PROCEDURE — 99291 CRITICAL CARE FIRST HOUR: CPT | Performed by: INTERNAL MEDICINE

## 2021-01-25 PROCEDURE — 94003 VENT MGMT INPAT SUBQ DAY: CPT

## 2021-01-25 PROCEDURE — 82962 GLUCOSE BLOOD TEST: CPT

## 2021-01-25 PROCEDURE — 82140 ASSAY OF AMMONIA: CPT | Performed by: INTERNAL MEDICINE

## 2021-01-25 PROCEDURE — 85025 COMPLETE CBC W/AUTO DIFF WBC: CPT | Performed by: INTERNAL MEDICINE

## 2021-01-25 PROCEDURE — 25010000002 VANCOMYCIN

## 2021-01-25 PROCEDURE — 84132 ASSAY OF SERUM POTASSIUM: CPT | Performed by: INTERNAL MEDICINE

## 2021-01-25 PROCEDURE — 80053 COMPREHEN METABOLIC PANEL: CPT | Performed by: INTERNAL MEDICINE

## 2021-01-25 PROCEDURE — 82375 ASSAY CARBOXYHB QUANT: CPT

## 2021-01-25 RX ORDER — AMANTADINE HYDROCHLORIDE 100 MG/1
100 CAPSULE, GELATIN COATED ORAL EVERY 12 HOURS SCHEDULED
Status: DISCONTINUED | OUTPATIENT
Start: 2021-01-25 | End: 2021-01-27

## 2021-01-25 RX ORDER — CLONIDINE HYDROCHLORIDE 0.1 MG/1
0.1 TABLET ORAL EVERY 12 HOURS SCHEDULED
Status: DISCONTINUED | OUTPATIENT
Start: 2021-01-25 | End: 2021-02-11

## 2021-01-25 RX ORDER — LACTULOSE 10 G/15ML
30 SOLUTION ORAL ONCE
Status: COMPLETED | OUTPATIENT
Start: 2021-01-25 | End: 2021-01-25

## 2021-01-25 RX ORDER — QUETIAPINE FUMARATE 25 MG/1
50 TABLET, FILM COATED ORAL EVERY 8 HOURS SCHEDULED
Status: DISCONTINUED | OUTPATIENT
Start: 2021-01-25 | End: 2021-02-02

## 2021-01-25 RX ORDER — ESCITALOPRAM OXALATE 20 MG/1
20 TABLET ORAL DAILY
Status: DISCONTINUED | OUTPATIENT
Start: 2021-01-25 | End: 2021-02-11

## 2021-01-25 RX ORDER — DEXTROSE MONOHYDRATE 50 MG/ML
50 INJECTION, SOLUTION INTRAVENOUS CONTINUOUS
Status: DISCONTINUED | OUTPATIENT
Start: 2021-01-25 | End: 2021-01-28

## 2021-01-25 RX ORDER — POTASSIUM CHLORIDE 29.8 MG/ML
20 INJECTION INTRAVENOUS
Status: DISCONTINUED | OUTPATIENT
Start: 2021-01-25 | End: 2021-02-11 | Stop reason: HOSPADM

## 2021-01-25 RX ORDER — POTASSIUM CHLORIDE 750 MG/1
40 CAPSULE, EXTENDED RELEASE ORAL AS NEEDED
Status: DISCONTINUED | OUTPATIENT
Start: 2021-01-25 | End: 2021-01-30

## 2021-01-25 RX ORDER — DEXMEDETOMIDINE HYDROCHLORIDE 4 UG/ML
.2-1.5 INJECTION, SOLUTION INTRAVENOUS
Status: DISCONTINUED | OUTPATIENT
Start: 2021-01-25 | End: 2021-01-31

## 2021-01-25 RX ORDER — QUETIAPINE FUMARATE 25 MG/1
50 TABLET, FILM COATED ORAL EVERY 8 HOURS SCHEDULED
Status: DISCONTINUED | OUTPATIENT
Start: 2021-01-25 | End: 2021-01-25

## 2021-01-25 RX ORDER — POTASSIUM CHLORIDE 1.5 G/1.77G
40 POWDER, FOR SOLUTION ORAL AS NEEDED
Status: DISCONTINUED | OUTPATIENT
Start: 2021-01-25 | End: 2021-02-11 | Stop reason: HOSPADM

## 2021-01-25 RX ADMIN — POTASSIUM CHLORIDE 40 MEQ: 1.5 POWDER, FOR SOLUTION ORAL at 10:48

## 2021-01-25 RX ADMIN — IPRATROPIUM BROMIDE AND ALBUTEROL SULFATE 3 ML: 2.5; .5 SOLUTION RESPIRATORY (INHALATION) at 08:09

## 2021-01-25 RX ADMIN — IPRATROPIUM BROMIDE AND ALBUTEROL SULFATE 3 ML: 2.5; .5 SOLUTION RESPIRATORY (INHALATION) at 03:54

## 2021-01-25 RX ADMIN — FOLIC ACID 1 MG: 5 INJECTION, SOLUTION INTRAMUSCULAR; INTRAVENOUS; SUBCUTANEOUS at 08:24

## 2021-01-25 RX ADMIN — DEXMEDETOMIDINE HYDROCHLORIDE 0.2 MCG/KG/HR: 400 INJECTION, SOLUTION INTRAVENOUS at 20:10

## 2021-01-25 RX ADMIN — POTASSIUM CHLORIDE 40 MEQ: 1.5 POWDER, FOR SOLUTION ORAL at 14:45

## 2021-01-25 RX ADMIN — CLONIDINE HYDROCHLORIDE 0.1 MG: 0.1 TABLET ORAL at 20:10

## 2021-01-25 RX ADMIN — POTASSIUM PHOSPHATE, MONOBASIC AND POTASSIUM PHOSPHATE, DIBASIC 45 MMOL: 224; 236 INJECTION, SOLUTION, CONCENTRATE INTRAVENOUS at 08:24

## 2021-01-25 RX ADMIN — VANCOMYCIN HYDROCHLORIDE 1250 MG: 10 INJECTION, POWDER, LYOPHILIZED, FOR SOLUTION INTRAVENOUS at 01:00

## 2021-01-25 RX ADMIN — IPRATROPIUM BROMIDE AND ALBUTEROL SULFATE 3 ML: 2.5; .5 SOLUTION RESPIRATORY (INHALATION) at 11:49

## 2021-01-25 RX ADMIN — AMANTADINE HYDROCHLORIDE 100 MG: 100 CAPSULE ORAL at 20:14

## 2021-01-25 RX ADMIN — IPRATROPIUM BROMIDE AND ALBUTEROL SULFATE 3 ML: 2.5; .5 SOLUTION RESPIRATORY (INHALATION) at 16:50

## 2021-01-25 RX ADMIN — FENTANYL CITRATE 300 MCG/HR: 0.05 INJECTION, SOLUTION INTRAMUSCULAR; INTRAVENOUS at 12:56

## 2021-01-25 RX ADMIN — DEXMEDETOMIDINE HYDROCHLORIDE 0.2 MCG/KG/HR: 400 INJECTION, SOLUTION INTRAVENOUS at 10:50

## 2021-01-25 RX ADMIN — QUETIAPINE 50 MG: 25 TABLET, FILM COATED ORAL at 22:24

## 2021-01-25 RX ADMIN — IPRATROPIUM BROMIDE AND ALBUTEROL SULFATE 3 ML: 2.5; .5 SOLUTION RESPIRATORY (INHALATION) at 22:50

## 2021-01-25 RX ADMIN — ENOXAPARIN SODIUM 40 MG: 40 INJECTION SUBCUTANEOUS at 08:24

## 2021-01-25 RX ADMIN — METOPROLOL TARTRATE 25 MG: 25 TABLET, FILM COATED ORAL at 20:10

## 2021-01-25 RX ADMIN — DEXTROSE MONOHYDRATE 50 ML/HR: 50 INJECTION, SOLUTION INTRAVENOUS at 10:50

## 2021-01-25 RX ADMIN — Medication 10 MG/HR: at 09:16

## 2021-01-25 RX ADMIN — PANTOPRAZOLE SODIUM 40 MG: 40 INJECTION, POWDER, LYOPHILIZED, FOR SOLUTION INTRAVENOUS at 05:06

## 2021-01-25 RX ADMIN — LACTULOSE 30 G: 20 SOLUTION ORAL at 10:38

## 2021-01-25 RX ADMIN — FENTANYL CITRATE 300 MCG/HR: 0.05 INJECTION, SOLUTION INTRAMUSCULAR; INTRAVENOUS at 04:53

## 2021-01-25 RX ADMIN — METOPROLOL TARTRATE 25 MG: 25 TABLET, FILM COATED ORAL at 08:24

## 2021-01-25 RX ADMIN — MEROPENEM 2 G: 1 INJECTION, POWDER, FOR SOLUTION INTRAVENOUS at 15:14

## 2021-01-25 RX ADMIN — CHLORHEXIDINE GLUCONATE 0.12% ORAL RINSE 15 ML: 1.2 LIQUID ORAL at 20:10

## 2021-01-25 RX ADMIN — POTASSIUM CHLORIDE 40 MEQ: 1.5 POWDER, FOR SOLUTION ORAL at 07:17

## 2021-01-25 RX ADMIN — QUETIAPINE 50 MG: 25 TABLET, FILM COATED ORAL at 15:04

## 2021-01-25 RX ADMIN — SENNOSIDES 10 ML: 8.8 LIQUID ORAL at 20:14

## 2021-01-25 RX ADMIN — SODIUM CHLORIDE, PRESERVATIVE FREE 10 ML: 5 INJECTION INTRAVENOUS at 08:25

## 2021-01-25 RX ADMIN — ESCITALOPRAM OXALATE 20 MG: 20 TABLET ORAL at 08:24

## 2021-01-25 RX ADMIN — MEROPENEM 2 G: 1 INJECTION, POWDER, FOR SOLUTION INTRAVENOUS at 08:24

## 2021-01-25 RX ADMIN — AMANTADINE HYDROCHLORIDE 100 MG: 100 CAPSULE ORAL at 10:38

## 2021-01-25 RX ADMIN — THIAMINE HCL TAB 100 MG 100 MG: 100 TAB at 11:05

## 2021-01-25 RX ADMIN — SODIUM CHLORIDE, PRESERVATIVE FREE 10 ML: 5 INJECTION INTRAVENOUS at 20:11

## 2021-01-25 RX ADMIN — ENOXAPARIN SODIUM 40 MG: 40 INJECTION SUBCUTANEOUS at 20:10

## 2021-01-25 RX ADMIN — CHLORHEXIDINE GLUCONATE 0.12% ORAL RINSE 15 ML: 1.2 LIQUID ORAL at 08:25

## 2021-01-25 RX ADMIN — IPRATROPIUM BROMIDE AND ALBUTEROL SULFATE 3 ML: 2.5; .5 SOLUTION RESPIRATORY (INHALATION) at 19:00

## 2021-01-26 ENCOUNTER — APPOINTMENT (OUTPATIENT)
Dept: GENERAL RADIOLOGY | Facility: HOSPITAL | Age: 46
End: 2021-01-26

## 2021-01-26 LAB
ALBUMIN SERPL-MCNC: 2.6 G/DL (ref 3.5–5.2)
ALP SERPL-CCNC: 102 U/L (ref 39–117)
ALT SERPL W P-5'-P-CCNC: 25 U/L (ref 1–41)
ANION GAP SERPL CALCULATED.3IONS-SCNC: 12 MMOL/L (ref 5–15)
AST SERPL-CCNC: 69 U/L (ref 1–40)
BACTERIA SPEC RESP CULT: NO GROWTH
BASOPHILS # BLD AUTO: 0.07 10*3/MM3 (ref 0–0.2)
BASOPHILS NFR BLD AUTO: 0.6 % (ref 0–1.5)
BILIRUB SERPL-MCNC: 0.3 MG/DL (ref 0–1.2)
BUN SERPL-MCNC: 21 MG/DL (ref 6–20)
CALCIUM SPEC-SCNC: 8.4 MG/DL (ref 8.6–10.5)
CHLORIDE SERPL-SCNC: 112 MMOL/L (ref 98–107)
CHOLEST SERPL-MCNC: 110 MG/DL (ref 0–200)
CLUMPED PLATELETS: PRESENT
CO2 SERPL-SCNC: 25 MMOL/L (ref 22–29)
CREAT SERPL-MCNC: 1.38 MG/DL (ref 0.76–1.27)
CRP SERPL-MCNC: 27.02 MG/DL (ref 0–0.5)
DEPRECATED RDW RBC AUTO: 54 FL (ref 37–54)
EOSINOPHIL # BLD AUTO: 0.2 10*3/MM3 (ref 0–0.4)
EOSINOPHIL NFR BLD AUTO: 1.7 % (ref 0.3–6.2)
ERYTHROCYTE [DISTWIDTH] IN BLOOD BY AUTOMATED COUNT: 16.5 % (ref 12.3–15.4)
GLUCOSE BLDC GLUCOMTR-MCNC: 118 MG/DL (ref 70–130)
GLUCOSE BLDC GLUCOMTR-MCNC: 121 MG/DL (ref 70–130)
GLUCOSE BLDC GLUCOMTR-MCNC: 122 MG/DL (ref 70–130)
GLUCOSE BLDC GLUCOMTR-MCNC: 128 MG/DL (ref 70–130)
GLUCOSE BLDC GLUCOMTR-MCNC: 141 MG/DL (ref 70–130)
GLUCOSE SERPL-MCNC: 123 MG/DL (ref 65–99)
GRAM STN SPEC: NORMAL
HCT VFR BLD AUTO: 41.5 % (ref 37.5–51)
HGB BLD-MCNC: 12 G/DL (ref 13–17.7)
IMM GRANULOCYTES # BLD AUTO: 0.57 10*3/MM3 (ref 0–0.05)
IMM GRANULOCYTES NFR BLD AUTO: 4.8 % (ref 0–0.5)
LIPASE SERPL-CCNC: 50 U/L (ref 13–60)
LYMPHOCYTES # BLD AUTO: 1.66 10*3/MM3 (ref 0.7–3.1)
LYMPHOCYTES NFR BLD AUTO: 14.1 % (ref 19.6–45.3)
MAGNESIUM SERPL-MCNC: 2.2 MG/DL (ref 1.6–2.6)
MCH RBC QN AUTO: 26 PG (ref 26.6–33)
MCHC RBC AUTO-ENTMCNC: 28.9 G/DL (ref 31.5–35.7)
MCV RBC AUTO: 89.8 FL (ref 79–97)
MONOCYTES # BLD AUTO: 0.57 10*3/MM3 (ref 0.1–0.9)
MONOCYTES NFR BLD AUTO: 4.8 % (ref 5–12)
NEUTROPHILS NFR BLD AUTO: 74 % (ref 42.7–76)
NEUTROPHILS NFR BLD AUTO: 8.69 10*3/MM3 (ref 1.7–7)
NRBC BLD AUTO-RTO: 1.6 /100 WBC (ref 0–0.2)
PHOSPHATE SERPL-MCNC: 3.1 MG/DL (ref 2.5–4.5)
PLATELET # BLD AUTO: 299 10*3/MM3 (ref 140–450)
PMV BLD AUTO: 11.1 FL (ref 6–12)
POTASSIUM SERPL-SCNC: 4 MMOL/L (ref 3.5–5.2)
PREALB SERPL-MCNC: 5.7 MG/DL (ref 20–40)
PROT SERPL-MCNC: 6.3 G/DL (ref 6–8.5)
RBC # BLD AUTO: 4.62 10*6/MM3 (ref 4.14–5.8)
RBC MORPH BLD: NORMAL
SODIUM SERPL-SCNC: 149 MMOL/L (ref 136–145)
TRIGL SERPL-MCNC: 364 MG/DL (ref 0–150)
WBC # BLD AUTO: 11.76 10*3/MM3 (ref 3.4–10.8)
WBC MORPH BLD: NORMAL

## 2021-01-26 PROCEDURE — 25010000002 MEROPENEM PER 100 MG: Performed by: INTERNAL MEDICINE

## 2021-01-26 PROCEDURE — 94799 UNLISTED PULMONARY SVC/PX: CPT

## 2021-01-26 PROCEDURE — 83735 ASSAY OF MAGNESIUM: CPT

## 2021-01-26 PROCEDURE — 25010000002 ENOXAPARIN PER 10 MG: Performed by: FAMILY MEDICINE

## 2021-01-26 PROCEDURE — 84100 ASSAY OF PHOSPHORUS: CPT

## 2021-01-26 PROCEDURE — 99291 CRITICAL CARE FIRST HOUR: CPT | Performed by: INTERNAL MEDICINE

## 2021-01-26 PROCEDURE — 82962 GLUCOSE BLOOD TEST: CPT

## 2021-01-26 PROCEDURE — 84478 ASSAY OF TRIGLYCERIDES: CPT

## 2021-01-26 PROCEDURE — 25010000002 FENTANYL CITRATE (PF) 2500 MCG/50ML SOLUTION: Performed by: INTERNAL MEDICINE

## 2021-01-26 PROCEDURE — 94003 VENT MGMT INPAT SUBQ DAY: CPT

## 2021-01-26 PROCEDURE — 84134 ASSAY OF PREALBUMIN: CPT

## 2021-01-26 PROCEDURE — 85007 BL SMEAR W/DIFF WBC COUNT: CPT | Performed by: INTERNAL MEDICINE

## 2021-01-26 PROCEDURE — 80053 COMPREHEN METABOLIC PANEL: CPT

## 2021-01-26 PROCEDURE — 86140 C-REACTIVE PROTEIN: CPT

## 2021-01-26 PROCEDURE — 85025 COMPLETE CBC W/AUTO DIFF WBC: CPT | Performed by: INTERNAL MEDICINE

## 2021-01-26 PROCEDURE — 82465 ASSAY BLD/SERUM CHOLESTEROL: CPT

## 2021-01-26 PROCEDURE — 94640 AIRWAY INHALATION TREATMENT: CPT

## 2021-01-26 PROCEDURE — 83690 ASSAY OF LIPASE: CPT | Performed by: INTERNAL MEDICINE

## 2021-01-26 PROCEDURE — 71045 X-RAY EXAM CHEST 1 VIEW: CPT

## 2021-01-26 RX ORDER — LACTULOSE 10 G/15ML
30 SOLUTION ORAL DAILY
Status: DISCONTINUED | OUTPATIENT
Start: 2021-01-26 | End: 2021-01-27

## 2021-01-26 RX ADMIN — ENOXAPARIN SODIUM 40 MG: 40 INJECTION SUBCUTANEOUS at 21:58

## 2021-01-26 RX ADMIN — CHLORHEXIDINE GLUCONATE 0.12% ORAL RINSE 15 ML: 1.2 LIQUID ORAL at 08:10

## 2021-01-26 RX ADMIN — MEROPENEM 2 G: 1 INJECTION, POWDER, FOR SOLUTION INTRAVENOUS at 00:14

## 2021-01-26 RX ADMIN — ACETAMINOPHEN ORAL SOLUTION 650 MG: 650 SOLUTION ORAL at 06:35

## 2021-01-26 RX ADMIN — CLONIDINE HYDROCHLORIDE 0.1 MG: 0.1 TABLET ORAL at 08:10

## 2021-01-26 RX ADMIN — MEROPENEM 2 G: 1 INJECTION, POWDER, FOR SOLUTION INTRAVENOUS at 17:39

## 2021-01-26 RX ADMIN — IPRATROPIUM BROMIDE AND ALBUTEROL SULFATE 3 ML: 2.5; .5 SOLUTION RESPIRATORY (INHALATION) at 05:11

## 2021-01-26 RX ADMIN — IPRATROPIUM BROMIDE AND ALBUTEROL SULFATE 3 ML: 2.5; .5 SOLUTION RESPIRATORY (INHALATION) at 15:18

## 2021-01-26 RX ADMIN — SODIUM PHOSPHATE, MONOBASIC, MONOHYDRATE AND SODIUM PHOSPHATE, DIBASIC, ANHYDROUS 15 MMOL: 276; 142 INJECTION, SOLUTION INTRAVENOUS at 01:15

## 2021-01-26 RX ADMIN — DEXMEDETOMIDINE HYDROCHLORIDE 0.5 MCG/KG/HR: 400 INJECTION, SOLUTION INTRAVENOUS at 05:30

## 2021-01-26 RX ADMIN — FENTANYL CITRATE 250 MCG/HR: 0.05 INJECTION, SOLUTION INTRAMUSCULAR; INTRAVENOUS at 01:15

## 2021-01-26 RX ADMIN — IPRATROPIUM BROMIDE AND ALBUTEROL SULFATE 3 ML: 2.5; .5 SOLUTION RESPIRATORY (INHALATION) at 07:07

## 2021-01-26 RX ADMIN — AMANTADINE HYDROCHLORIDE 100 MG: 100 CAPSULE ORAL at 21:58

## 2021-01-26 RX ADMIN — METOPROLOL TARTRATE 25 MG: 25 TABLET, FILM COATED ORAL at 21:58

## 2021-01-26 RX ADMIN — CHLORHEXIDINE GLUCONATE 0.12% ORAL RINSE 15 ML: 1.2 LIQUID ORAL at 21:58

## 2021-01-26 RX ADMIN — FENTANYL CITRATE 200 MCG/HR: 0.05 INJECTION, SOLUTION INTRAMUSCULAR; INTRAVENOUS at 08:39

## 2021-01-26 RX ADMIN — SODIUM CHLORIDE, PRESERVATIVE FREE 10 ML: 5 INJECTION INTRAVENOUS at 21:58

## 2021-01-26 RX ADMIN — IPRATROPIUM BROMIDE AND ALBUTEROL SULFATE 3 ML: 2.5; .5 SOLUTION RESPIRATORY (INHALATION) at 19:25

## 2021-01-26 RX ADMIN — THIAMINE HCL TAB 100 MG 100 MG: 100 TAB at 08:10

## 2021-01-26 RX ADMIN — SENNOSIDES 10 ML: 8.8 LIQUID ORAL at 21:58

## 2021-01-26 RX ADMIN — QUETIAPINE 50 MG: 25 TABLET, FILM COATED ORAL at 21:58

## 2021-01-26 RX ADMIN — DEXMEDETOMIDINE HYDROCHLORIDE 0.5 MCG/KG/HR: 400 INJECTION, SOLUTION INTRAVENOUS at 10:37

## 2021-01-26 RX ADMIN — QUETIAPINE 50 MG: 25 TABLET, FILM COATED ORAL at 06:36

## 2021-01-26 RX ADMIN — DEXMEDETOMIDINE HYDROCHLORIDE 0.5 MCG/KG/HR: 400 INJECTION, SOLUTION INTRAVENOUS at 15:54

## 2021-01-26 RX ADMIN — CLONIDINE HYDROCHLORIDE 0.1 MG: 0.1 TABLET ORAL at 21:58

## 2021-01-26 RX ADMIN — ACETAMINOPHEN ORAL SOLUTION 649.6 MG: 650 SOLUTION ORAL at 14:23

## 2021-01-26 RX ADMIN — ACETAMINOPHEN ORAL SOLUTION 650 MG: 650 SOLUTION ORAL at 22:32

## 2021-01-26 RX ADMIN — QUETIAPINE 50 MG: 25 TABLET, FILM COATED ORAL at 14:23

## 2021-01-26 RX ADMIN — LACTULOSE 20 G: 20 SOLUTION ORAL at 10:36

## 2021-01-26 RX ADMIN — SODIUM CHLORIDE, PRESERVATIVE FREE 10 ML: 5 INJECTION INTRAVENOUS at 08:11

## 2021-01-26 RX ADMIN — ESCITALOPRAM OXALATE 20 MG: 20 TABLET ORAL at 08:10

## 2021-01-26 RX ADMIN — MEROPENEM 2 G: 1 INJECTION, POWDER, FOR SOLUTION INTRAVENOUS at 08:10

## 2021-01-26 RX ADMIN — ENOXAPARIN SODIUM 40 MG: 40 INJECTION SUBCUTANEOUS at 08:10

## 2021-01-26 RX ADMIN — AMANTADINE HYDROCHLORIDE 100 MG: 100 CAPSULE ORAL at 08:10

## 2021-01-26 RX ADMIN — DEXMEDETOMIDINE HYDROCHLORIDE 0.5 MCG/KG/HR: 400 INJECTION, SOLUTION INTRAVENOUS at 21:16

## 2021-01-26 RX ADMIN — DEXTROSE MONOHYDRATE 50 ML/HR: 50 INJECTION, SOLUTION INTRAVENOUS at 06:36

## 2021-01-26 RX ADMIN — IPRATROPIUM BROMIDE AND ALBUTEROL SULFATE 3 ML: 2.5; .5 SOLUTION RESPIRATORY (INHALATION) at 02:57

## 2021-01-26 RX ADMIN — IPRATROPIUM BROMIDE AND ALBUTEROL SULFATE 3 ML: 2.5; .5 SOLUTION RESPIRATORY (INHALATION) at 13:06

## 2021-01-26 RX ADMIN — METOPROLOL TARTRATE 25 MG: 25 TABLET, FILM COATED ORAL at 08:10

## 2021-01-26 RX ADMIN — PANTOPRAZOLE SODIUM 40 MG: 40 INJECTION, POWDER, LYOPHILIZED, FOR SOLUTION INTRAVENOUS at 06:36

## 2021-01-26 RX ADMIN — FOLIC ACID 1 MG: 5 INJECTION, SOLUTION INTRAMUSCULAR; INTRAVENOUS; SUBCUTANEOUS at 08:10

## 2021-01-27 ENCOUNTER — APPOINTMENT (OUTPATIENT)
Dept: GENERAL RADIOLOGY | Facility: HOSPITAL | Age: 46
End: 2021-01-27

## 2021-01-27 ENCOUNTER — APPOINTMENT (OUTPATIENT)
Dept: CT IMAGING | Facility: HOSPITAL | Age: 46
End: 2021-01-27

## 2021-01-27 LAB
ALBUMIN SERPL-MCNC: 2.8 G/DL (ref 3.5–5.2)
ANION GAP SERPL CALCULATED.3IONS-SCNC: 10 MMOL/L (ref 5–15)
BACTERIA SPEC AEROBE CULT: NORMAL
BACTERIA SPEC AEROBE CULT: NORMAL
BASOPHILS # BLD AUTO: 0.09 10*3/MM3 (ref 0–0.2)
BASOPHILS NFR BLD AUTO: 0.6 % (ref 0–1.5)
BUN SERPL-MCNC: 26 MG/DL (ref 6–20)
BUN/CREAT SERPL: 19.5 (ref 7–25)
CALCIUM SPEC-SCNC: 9 MG/DL (ref 8.6–10.5)
CHLORIDE SERPL-SCNC: 110 MMOL/L (ref 98–107)
CO2 SERPL-SCNC: 27 MMOL/L (ref 22–29)
CREAT SERPL-MCNC: 1.33 MG/DL (ref 0.76–1.27)
D-LACTATE SERPL-SCNC: 1.5 MMOL/L (ref 0.5–2)
DEPRECATED RDW RBC AUTO: 53 FL (ref 37–54)
EOSINOPHIL # BLD AUTO: 0.2 10*3/MM3 (ref 0–0.4)
EOSINOPHIL NFR BLD AUTO: 1.3 % (ref 0.3–6.2)
ERYTHROCYTE [DISTWIDTH] IN BLOOD BY AUTOMATED COUNT: 16.3 % (ref 12.3–15.4)
GFR SERPL CREATININE-BSD FRML MDRD: 70 ML/MIN/1.73
GLUCOSE BLDC GLUCOMTR-MCNC: 118 MG/DL (ref 70–130)
GLUCOSE BLDC GLUCOMTR-MCNC: 118 MG/DL (ref 70–130)
GLUCOSE BLDC GLUCOMTR-MCNC: 128 MG/DL (ref 70–130)
GLUCOSE BLDC GLUCOMTR-MCNC: 143 MG/DL (ref 70–130)
GLUCOSE SERPL-MCNC: 119 MG/DL (ref 65–99)
HCT VFR BLD AUTO: 42.3 % (ref 37.5–51)
HGB BLD-MCNC: 12.3 G/DL (ref 13–17.7)
IMM GRANULOCYTES # BLD AUTO: 0.37 10*3/MM3 (ref 0–0.05)
IMM GRANULOCYTES NFR BLD AUTO: 2.4 % (ref 0–0.5)
LIPASE SERPL-CCNC: 62 U/L (ref 13–60)
LYMPHOCYTES # BLD AUTO: 1.97 10*3/MM3 (ref 0.7–3.1)
LYMPHOCYTES NFR BLD AUTO: 12.8 % (ref 19.6–45.3)
MCH RBC QN AUTO: 25.8 PG (ref 26.6–33)
MCHC RBC AUTO-ENTMCNC: 29.1 G/DL (ref 31.5–35.7)
MCV RBC AUTO: 88.9 FL (ref 79–97)
MONOCYTES # BLD AUTO: 0.56 10*3/MM3 (ref 0.1–0.9)
MONOCYTES NFR BLD AUTO: 3.6 % (ref 5–12)
NEUTROPHILS NFR BLD AUTO: 12.18 10*3/MM3 (ref 1.7–7)
NEUTROPHILS NFR BLD AUTO: 79.3 % (ref 42.7–76)
NRBC BLD AUTO-RTO: 1.1 /100 WBC (ref 0–0.2)
PHOSPHATE SERPL-MCNC: 4 MG/DL (ref 2.5–4.5)
PLATELET # BLD AUTO: 287 10*3/MM3 (ref 140–450)
PMV BLD AUTO: 11.9 FL (ref 6–12)
POTASSIUM SERPL-SCNC: 3.9 MMOL/L (ref 3.5–5.2)
RBC # BLD AUTO: 4.76 10*6/MM3 (ref 4.14–5.8)
SODIUM SERPL-SCNC: 147 MMOL/L (ref 136–145)
WBC # BLD AUTO: 15.37 10*3/MM3 (ref 3.4–10.8)

## 2021-01-27 PROCEDURE — 25010000002 ENOXAPARIN PER 10 MG: Performed by: FAMILY MEDICINE

## 2021-01-27 PROCEDURE — 0 DIATRIZOATE MEGLUMINE & SODIUM PER 1 ML

## 2021-01-27 PROCEDURE — 94003 VENT MGMT INPAT SUBQ DAY: CPT

## 2021-01-27 PROCEDURE — 94799 UNLISTED PULMONARY SVC/PX: CPT

## 2021-01-27 PROCEDURE — 83605 ASSAY OF LACTIC ACID: CPT | Performed by: INTERNAL MEDICINE

## 2021-01-27 PROCEDURE — 74177 CT ABD & PELVIS W/CONTRAST: CPT

## 2021-01-27 PROCEDURE — 99291 CRITICAL CARE FIRST HOUR: CPT | Performed by: INTERNAL MEDICINE

## 2021-01-27 PROCEDURE — 25010000002 IOPAMIDOL 61 % SOLUTION: Performed by: INTERNAL MEDICINE

## 2021-01-27 PROCEDURE — 85025 COMPLETE CBC W/AUTO DIFF WBC: CPT | Performed by: INTERNAL MEDICINE

## 2021-01-27 PROCEDURE — 71045 X-RAY EXAM CHEST 1 VIEW: CPT

## 2021-01-27 PROCEDURE — 82962 GLUCOSE BLOOD TEST: CPT

## 2021-01-27 PROCEDURE — 25010000002 FENTANYL CITRATE (PF) 2500 MCG/50ML SOLUTION: Performed by: INTERNAL MEDICINE

## 2021-01-27 PROCEDURE — 74018 RADEX ABDOMEN 1 VIEW: CPT

## 2021-01-27 PROCEDURE — 83690 ASSAY OF LIPASE: CPT | Performed by: INTERNAL MEDICINE

## 2021-01-27 PROCEDURE — 25010000002 MEROPENEM PER 100 MG: Performed by: INTERNAL MEDICINE

## 2021-01-27 PROCEDURE — 25010000002 DIAZEPAM PER 5 MG: Performed by: NURSE PRACTITIONER

## 2021-01-27 PROCEDURE — 80069 RENAL FUNCTION PANEL: CPT | Performed by: INTERNAL MEDICINE

## 2021-01-27 RX ORDER — MIDAZOLAM HYDROCHLORIDE 1 MG/ML
4 INJECTION INTRAMUSCULAR; INTRAVENOUS
Status: COMPLETED | OUTPATIENT
Start: 2021-01-27 | End: 2021-01-28

## 2021-01-27 RX ORDER — DIAZEPAM 5 MG/ML
10 INJECTION, SOLUTION INTRAMUSCULAR; INTRAVENOUS ONCE
Status: COMPLETED | OUTPATIENT
Start: 2021-01-27 | End: 2021-01-27

## 2021-01-27 RX ORDER — AMANTADINE HYDROCHLORIDE 100 MG/1
100 CAPSULE, GELATIN COATED ORAL DAILY
Status: DISCONTINUED | OUTPATIENT
Start: 2021-01-28 | End: 2021-01-28

## 2021-01-27 RX ADMIN — CHLORHEXIDINE GLUCONATE 0.12% ORAL RINSE 15 ML: 1.2 LIQUID ORAL at 08:37

## 2021-01-27 RX ADMIN — DEXMEDETOMIDINE HYDROCHLORIDE 0.7 MCG/KG/HR: 400 INJECTION, SOLUTION INTRAVENOUS at 09:24

## 2021-01-27 RX ADMIN — THIAMINE HCL TAB 100 MG 100 MG: 100 TAB at 13:17

## 2021-01-27 RX ADMIN — SENNOSIDES 10 ML: 8.8 LIQUID ORAL at 21:26

## 2021-01-27 RX ADMIN — IPRATROPIUM BROMIDE AND ALBUTEROL SULFATE 3 ML: 2.5; .5 SOLUTION RESPIRATORY (INHALATION) at 07:24

## 2021-01-27 RX ADMIN — QUETIAPINE 50 MG: 25 TABLET, FILM COATED ORAL at 13:17

## 2021-01-27 RX ADMIN — QUETIAPINE 50 MG: 25 TABLET, FILM COATED ORAL at 05:47

## 2021-01-27 RX ADMIN — PANTOPRAZOLE SODIUM 40 MG: 40 INJECTION, POWDER, LYOPHILIZED, FOR SOLUTION INTRAVENOUS at 05:47

## 2021-01-27 RX ADMIN — FENTANYL CITRATE 200 MCG/HR: 0.05 INJECTION, SOLUTION INTRAMUSCULAR; INTRAVENOUS at 03:28

## 2021-01-27 RX ADMIN — DEXMEDETOMIDINE HYDROCHLORIDE 0.7 MCG/KG/HR: 400 INJECTION, SOLUTION INTRAVENOUS at 15:48

## 2021-01-27 RX ADMIN — CLONIDINE HYDROCHLORIDE 0.1 MG: 0.1 TABLET ORAL at 13:17

## 2021-01-27 RX ADMIN — IPRATROPIUM BROMIDE AND ALBUTEROL SULFATE 3 ML: 2.5; .5 SOLUTION RESPIRATORY (INHALATION) at 10:49

## 2021-01-27 RX ADMIN — FENTANYL CITRATE 200 MCG/HR: 0.05 INJECTION, SOLUTION INTRAMUSCULAR; INTRAVENOUS at 10:24

## 2021-01-27 RX ADMIN — CHLORHEXIDINE GLUCONATE 0.12% ORAL RINSE 15 ML: 1.2 LIQUID ORAL at 21:25

## 2021-01-27 RX ADMIN — DEXMEDETOMIDINE HYDROCHLORIDE 0.7 MCG/KG/HR: 400 INJECTION, SOLUTION INTRAVENOUS at 18:38

## 2021-01-27 RX ADMIN — ENOXAPARIN SODIUM 40 MG: 40 INJECTION SUBCUTANEOUS at 08:37

## 2021-01-27 RX ADMIN — DEXTROSE MONOHYDRATE 50 ML/HR: 50 INJECTION, SOLUTION INTRAVENOUS at 00:36

## 2021-01-27 RX ADMIN — IPRATROPIUM BROMIDE AND ALBUTEROL SULFATE 3 ML: 2.5; .5 SOLUTION RESPIRATORY (INHALATION) at 15:30

## 2021-01-27 RX ADMIN — SODIUM CHLORIDE, PRESERVATIVE FREE 10 ML: 5 INJECTION INTRAVENOUS at 12:40

## 2021-01-27 RX ADMIN — IPRATROPIUM BROMIDE AND ALBUTEROL SULFATE 3 ML: 2.5; .5 SOLUTION RESPIRATORY (INHALATION) at 00:26

## 2021-01-27 RX ADMIN — QUETIAPINE 50 MG: 25 TABLET, FILM COATED ORAL at 21:26

## 2021-01-27 RX ADMIN — CLONIDINE HYDROCHLORIDE 0.1 MG: 0.1 TABLET ORAL at 21:26

## 2021-01-27 RX ADMIN — DEXTROSE MONOHYDRATE 50 ML/HR: 50 INJECTION, SOLUTION INTRAVENOUS at 17:30

## 2021-01-27 RX ADMIN — IPRATROPIUM BROMIDE AND ALBUTEROL SULFATE 3 ML: 2.5; .5 SOLUTION RESPIRATORY (INHALATION) at 04:01

## 2021-01-27 RX ADMIN — DEXMEDETOMIDINE HYDROCHLORIDE 0.5 MCG/KG/HR: 400 INJECTION, SOLUTION INTRAVENOUS at 02:18

## 2021-01-27 RX ADMIN — MEROPENEM 2 G: 1 INJECTION, POWDER, FOR SOLUTION INTRAVENOUS at 08:31

## 2021-01-27 RX ADMIN — ACETAMINOPHEN ORAL SOLUTION 649.6 MG: 650 SOLUTION ORAL at 13:16

## 2021-01-27 RX ADMIN — IPRATROPIUM BROMIDE AND ALBUTEROL SULFATE 3 ML: 2.5; .5 SOLUTION RESPIRATORY (INHALATION) at 19:21

## 2021-01-27 RX ADMIN — SODIUM CHLORIDE, PRESERVATIVE FREE 10 ML: 5 INJECTION INTRAVENOUS at 21:27

## 2021-01-27 RX ADMIN — METOPROLOL TARTRATE 25 MG: 25 TABLET, FILM COATED ORAL at 21:26

## 2021-01-27 RX ADMIN — ACETAMINOPHEN ORAL SOLUTION 649.6 MG: 650 SOLUTION ORAL at 18:34

## 2021-01-27 RX ADMIN — DEXMEDETOMIDINE HYDROCHLORIDE 1 MCG/KG/HR: 400 INJECTION, SOLUTION INTRAVENOUS at 13:12

## 2021-01-27 RX ADMIN — MEROPENEM 2 G: 1 INJECTION, POWDER, FOR SOLUTION INTRAVENOUS at 00:20

## 2021-01-27 RX ADMIN — DIAZEPAM 10 MG: 5 INJECTION, SOLUTION INTRAMUSCULAR; INTRAVENOUS at 03:52

## 2021-01-27 RX ADMIN — ACETAMINOPHEN ORAL SOLUTION 650 MG: 650 SOLUTION ORAL at 05:47

## 2021-01-27 RX ADMIN — ESCITALOPRAM OXALATE 20 MG: 20 TABLET ORAL at 13:17

## 2021-01-27 RX ADMIN — FOLIC ACID 1 MG: 5 INJECTION, SOLUTION INTRAMUSCULAR; INTRAVENOUS; SUBCUTANEOUS at 08:31

## 2021-01-27 RX ADMIN — IOPAMIDOL 100 ML: 612 INJECTION, SOLUTION INTRAVENOUS at 13:00

## 2021-01-27 RX ADMIN — METOPROLOL TARTRATE 25 MG: 25 TABLET, FILM COATED ORAL at 13:17

## 2021-01-27 RX ADMIN — MEROPENEM 2 G: 1 INJECTION, POWDER, FOR SOLUTION INTRAVENOUS at 15:09

## 2021-01-27 RX ADMIN — DIATRIZOATE MEGLUMINE AND DIATRIZOATE SODIUM 15 ML: 660; 100 LIQUID ORAL; RECTAL at 10:23

## 2021-01-27 RX ADMIN — DEXMEDETOMIDINE HYDROCHLORIDE 0.9 MCG/KG/HR: 400 INJECTION, SOLUTION INTRAVENOUS at 05:46

## 2021-01-27 RX ADMIN — ENOXAPARIN SODIUM 40 MG: 40 INJECTION SUBCUTANEOUS at 21:26

## 2021-01-28 ENCOUNTER — APPOINTMENT (OUTPATIENT)
Dept: GENERAL RADIOLOGY | Facility: HOSPITAL | Age: 46
End: 2021-01-28

## 2021-01-28 LAB
ALBUMIN SERPL-MCNC: 2.6 G/DL (ref 3.5–5.2)
ANION GAP SERPL CALCULATED.3IONS-SCNC: 10 MMOL/L (ref 5–15)
BASOPHILS # BLD AUTO: 0.04 10*3/MM3 (ref 0–0.2)
BASOPHILS NFR BLD AUTO: 0.2 % (ref 0–1.5)
BUN SERPL-MCNC: 19 MG/DL (ref 6–20)
BUN/CREAT SERPL: 19.4 (ref 7–25)
CALCIUM SPEC-SCNC: 8.8 MG/DL (ref 8.6–10.5)
CHLORIDE SERPL-SCNC: 106 MMOL/L (ref 98–107)
CO2 SERPL-SCNC: 27 MMOL/L (ref 22–29)
CREAT SERPL-MCNC: 0.98 MG/DL (ref 0.76–1.27)
DEPRECATED RDW RBC AUTO: 53.2 FL (ref 37–54)
EOSINOPHIL # BLD AUTO: 0.15 10*3/MM3 (ref 0–0.4)
EOSINOPHIL NFR BLD AUTO: 0.8 % (ref 0.3–6.2)
ERYTHROCYTE [DISTWIDTH] IN BLOOD BY AUTOMATED COUNT: 16.2 % (ref 12.3–15.4)
GFR SERPL CREATININE-BSD FRML MDRD: 100 ML/MIN/1.73
GLUCOSE BLDC GLUCOMTR-MCNC: 126 MG/DL (ref 70–130)
GLUCOSE BLDC GLUCOMTR-MCNC: 127 MG/DL (ref 70–130)
GLUCOSE BLDC GLUCOMTR-MCNC: 147 MG/DL (ref 70–130)
GLUCOSE BLDC GLUCOMTR-MCNC: 159 MG/DL (ref 70–130)
GLUCOSE SERPL-MCNC: 127 MG/DL (ref 65–99)
HCT VFR BLD AUTO: 42.1 % (ref 37.5–51)
HGB BLD-MCNC: 12.1 G/DL (ref 13–17.7)
IMM GRANULOCYTES # BLD AUTO: 0.21 10*3/MM3 (ref 0–0.05)
IMM GRANULOCYTES NFR BLD AUTO: 1.2 % (ref 0–0.5)
LYMPHOCYTES # BLD AUTO: 1.47 10*3/MM3 (ref 0.7–3.1)
LYMPHOCYTES NFR BLD AUTO: 8.3 % (ref 19.6–45.3)
MCH RBC QN AUTO: 25.9 PG (ref 26.6–33)
MCHC RBC AUTO-ENTMCNC: 28.7 G/DL (ref 31.5–35.7)
MCV RBC AUTO: 90 FL (ref 79–97)
MONOCYTES # BLD AUTO: 0.55 10*3/MM3 (ref 0.1–0.9)
MONOCYTES NFR BLD AUTO: 3.1 % (ref 5–12)
NEUTROPHILS NFR BLD AUTO: 15.33 10*3/MM3 (ref 1.7–7)
NEUTROPHILS NFR BLD AUTO: 86.4 % (ref 42.7–76)
NRBC BLD AUTO-RTO: 0.5 /100 WBC (ref 0–0.2)
PHOSPHATE SERPL-MCNC: 3.3 MG/DL (ref 2.5–4.5)
PLATELET # BLD AUTO: 283 10*3/MM3 (ref 140–450)
PMV BLD AUTO: 12 FL (ref 6–12)
POTASSIUM SERPL-SCNC: 4.2 MMOL/L (ref 3.5–5.2)
RBC # BLD AUTO: 4.68 10*6/MM3 (ref 4.14–5.8)
SODIUM SERPL-SCNC: 143 MMOL/L (ref 136–145)
WBC # BLD AUTO: 17.75 10*3/MM3 (ref 3.4–10.8)

## 2021-01-28 PROCEDURE — 25010000002 MICAFUNGIN SODIUM 100 MG RECONSTITUTED SOLUTION: Performed by: INTERNAL MEDICINE

## 2021-01-28 PROCEDURE — 94799 UNLISTED PULMONARY SVC/PX: CPT

## 2021-01-28 PROCEDURE — 85025 COMPLETE CBC W/AUTO DIFF WBC: CPT | Performed by: INTERNAL MEDICINE

## 2021-01-28 PROCEDURE — 99291 CRITICAL CARE FIRST HOUR: CPT | Performed by: INTERNAL MEDICINE

## 2021-01-28 PROCEDURE — 82805 BLOOD GASES W/O2 SATURATION: CPT

## 2021-01-28 PROCEDURE — 82962 GLUCOSE BLOOD TEST: CPT

## 2021-01-28 PROCEDURE — 71045 X-RAY EXAM CHEST 1 VIEW: CPT

## 2021-01-28 PROCEDURE — 25010000002 MEROPENEM PER 100 MG: Performed by: INTERNAL MEDICINE

## 2021-01-28 PROCEDURE — 25010000002 FENTANYL CITRATE (PF) 2500 MCG/50ML SOLUTION: Performed by: INTERNAL MEDICINE

## 2021-01-28 PROCEDURE — 82375 ASSAY CARBOXYHB QUANT: CPT

## 2021-01-28 PROCEDURE — 25010000002 ENOXAPARIN PER 10 MG: Performed by: FAMILY MEDICINE

## 2021-01-28 PROCEDURE — 25010000002 MIDAZOLAM PER 1 MG: Performed by: INTERNAL MEDICINE

## 2021-01-28 PROCEDURE — 80069 RENAL FUNCTION PANEL: CPT | Performed by: INTERNAL MEDICINE

## 2021-01-28 PROCEDURE — 94003 VENT MGMT INPAT SUBQ DAY: CPT

## 2021-01-28 PROCEDURE — 83050 HGB METHEMOGLOBIN QUAN: CPT

## 2021-01-28 RX ORDER — NICOTINE 21 MG/24HR
1 PATCH, TRANSDERMAL 24 HOURS TRANSDERMAL DAILY PRN
Status: DISCONTINUED | OUTPATIENT
Start: 2021-01-28 | End: 2021-02-11 | Stop reason: HOSPADM

## 2021-01-28 RX ORDER — MIDAZOLAM HYDROCHLORIDE 1 MG/ML
2 INJECTION INTRAMUSCULAR; INTRAVENOUS
Status: DISCONTINUED | OUTPATIENT
Start: 2021-01-28 | End: 2021-01-29

## 2021-01-28 RX ADMIN — DEXMEDETOMIDINE HYDROCHLORIDE 1.1 MCG/KG/HR: 400 INJECTION, SOLUTION INTRAVENOUS at 23:32

## 2021-01-28 RX ADMIN — IPRATROPIUM BROMIDE AND ALBUTEROL SULFATE 3 ML: 2.5; .5 SOLUTION RESPIRATORY (INHALATION) at 15:47

## 2021-01-28 RX ADMIN — FENTANYL CITRATE 150 MCG/HR: 0.05 INJECTION, SOLUTION INTRAMUSCULAR; INTRAVENOUS at 00:18

## 2021-01-28 RX ADMIN — CLONIDINE HYDROCHLORIDE 0.1 MG: 0.1 TABLET ORAL at 09:27

## 2021-01-28 RX ADMIN — DEXTROSE MONOHYDRATE 50 ML/HR: 50 INJECTION, SOLUTION INTRAVENOUS at 05:45

## 2021-01-28 RX ADMIN — FENTANYL CITRATE 175 MCG/HR: 0.05 INJECTION, SOLUTION INTRAMUSCULAR; INTRAVENOUS at 12:48

## 2021-01-28 RX ADMIN — SODIUM CHLORIDE, PRESERVATIVE FREE 10 ML: 5 INJECTION INTRAVENOUS at 09:28

## 2021-01-28 RX ADMIN — QUETIAPINE 50 MG: 25 TABLET, FILM COATED ORAL at 14:55

## 2021-01-28 RX ADMIN — DEXMEDETOMIDINE HYDROCHLORIDE 0.7 MCG/KG/HR: 400 INJECTION, SOLUTION INTRAVENOUS at 03:50

## 2021-01-28 RX ADMIN — THIAMINE HCL TAB 100 MG 100 MG: 100 TAB at 09:27

## 2021-01-28 RX ADMIN — METOPROLOL TARTRATE 25 MG: 25 TABLET, FILM COATED ORAL at 09:28

## 2021-01-28 RX ADMIN — DEXMEDETOMIDINE HYDROCHLORIDE 0.9 MCG/KG/HR: 400 INJECTION, SOLUTION INTRAVENOUS at 12:17

## 2021-01-28 RX ADMIN — IPRATROPIUM BROMIDE AND ALBUTEROL SULFATE 3 ML: 2.5; .5 SOLUTION RESPIRATORY (INHALATION) at 07:36

## 2021-01-28 RX ADMIN — DEXMEDETOMIDINE HYDROCHLORIDE 0.9 MCG/KG/HR: 400 INJECTION, SOLUTION INTRAVENOUS at 09:26

## 2021-01-28 RX ADMIN — DEXMEDETOMIDINE HYDROCHLORIDE 0.8 MCG/KG/HR: 400 INJECTION, SOLUTION INTRAVENOUS at 06:19

## 2021-01-28 RX ADMIN — QUETIAPINE 50 MG: 25 TABLET, FILM COATED ORAL at 21:02

## 2021-01-28 RX ADMIN — CHLORHEXIDINE GLUCONATE 0.12% ORAL RINSE 15 ML: 1.2 LIQUID ORAL at 09:26

## 2021-01-28 RX ADMIN — SODIUM CHLORIDE, PRESERVATIVE FREE 10 ML: 5 INJECTION INTRAVENOUS at 20:55

## 2021-01-28 RX ADMIN — IPRATROPIUM BROMIDE AND ALBUTEROL SULFATE 3 ML: 2.5; .5 SOLUTION RESPIRATORY (INHALATION) at 11:10

## 2021-01-28 RX ADMIN — MICAFUNGIN SODIUM 100 MG: 100 INJECTION, POWDER, LYOPHILIZED, FOR SOLUTION INTRAVENOUS at 12:17

## 2021-01-28 RX ADMIN — MEROPENEM 2 G: 1 INJECTION, POWDER, FOR SOLUTION INTRAVENOUS at 10:39

## 2021-01-28 RX ADMIN — DEXMEDETOMIDINE HYDROCHLORIDE 0.9 MCG/KG/HR: 400 INJECTION, SOLUTION INTRAVENOUS at 14:56

## 2021-01-28 RX ADMIN — ACETAMINOPHEN ORAL SOLUTION 650 MG: 650 SOLUTION ORAL at 05:37

## 2021-01-28 RX ADMIN — ESCITALOPRAM OXALATE 20 MG: 20 TABLET ORAL at 09:27

## 2021-01-28 RX ADMIN — QUETIAPINE 50 MG: 25 TABLET, FILM COATED ORAL at 05:37

## 2021-01-28 RX ADMIN — AMANTADINE HYDROCHLORIDE 100 MG: 100 CAPSULE ORAL at 09:32

## 2021-01-28 RX ADMIN — MIDAZOLAM 2 MG: 1 INJECTION INTRAMUSCULAR; INTRAVENOUS at 21:10

## 2021-01-28 RX ADMIN — DEXMEDETOMIDINE HYDROCHLORIDE 0.9 MCG/KG/HR: 400 INJECTION, SOLUTION INTRAVENOUS at 20:55

## 2021-01-28 RX ADMIN — CHLORHEXIDINE GLUCONATE 0.12% ORAL RINSE 15 ML: 1.2 LIQUID ORAL at 20:55

## 2021-01-28 RX ADMIN — ACETAMINOPHEN ORAL SOLUTION 650 MG: 650 SOLUTION ORAL at 12:19

## 2021-01-28 RX ADMIN — DEXMEDETOMIDINE HYDROCHLORIDE 0.9 MCG/KG/HR: 400 INJECTION, SOLUTION INTRAVENOUS at 17:45

## 2021-01-28 RX ADMIN — MIDAZOLAM 4 MG: 1 INJECTION INTRAMUSCULAR; INTRAVENOUS at 06:16

## 2021-01-28 RX ADMIN — FOLIC ACID 1 MG: 5 INJECTION, SOLUTION INTRAMUSCULAR; INTRAVENOUS; SUBCUTANEOUS at 09:49

## 2021-01-28 RX ADMIN — MEROPENEM 2 G: 1 INJECTION, POWDER, FOR SOLUTION INTRAVENOUS at 00:08

## 2021-01-28 RX ADMIN — MEROPENEM 2 G: 1 INJECTION, POWDER, FOR SOLUTION INTRAVENOUS at 23:33

## 2021-01-28 RX ADMIN — SENNOSIDES 10 ML: 8.8 LIQUID ORAL at 20:55

## 2021-01-28 RX ADMIN — IPRATROPIUM BROMIDE AND ALBUTEROL SULFATE 3 ML: 2.5; .5 SOLUTION RESPIRATORY (INHALATION) at 04:30

## 2021-01-28 RX ADMIN — ACETAMINOPHEN ORAL SOLUTION 650 MG: 650 SOLUTION ORAL at 20:55

## 2021-01-28 RX ADMIN — ENOXAPARIN SODIUM 40 MG: 40 INJECTION SUBCUTANEOUS at 20:55

## 2021-01-28 RX ADMIN — ENOXAPARIN SODIUM 40 MG: 40 INJECTION SUBCUTANEOUS at 09:26

## 2021-01-28 RX ADMIN — PANTOPRAZOLE SODIUM 40 MG: 40 INJECTION, POWDER, LYOPHILIZED, FOR SOLUTION INTRAVENOUS at 05:37

## 2021-01-28 RX ADMIN — CLONIDINE HYDROCHLORIDE 0.1 MG: 0.1 TABLET ORAL at 20:55

## 2021-01-28 RX ADMIN — DEXMEDETOMIDINE HYDROCHLORIDE 0.7 MCG/KG/HR: 400 INJECTION, SOLUTION INTRAVENOUS at 00:08

## 2021-01-28 RX ADMIN — MEROPENEM 2 G: 1 INJECTION, POWDER, FOR SOLUTION INTRAVENOUS at 16:27

## 2021-01-28 RX ADMIN — ACETAMINOPHEN ORAL SOLUTION 650 MG: 650 SOLUTION ORAL at 00:15

## 2021-01-28 RX ADMIN — METOPROLOL TARTRATE 25 MG: 25 TABLET, FILM COATED ORAL at 20:55

## 2021-01-28 RX ADMIN — IPRATROPIUM BROMIDE AND ALBUTEROL SULFATE 3 ML: 2.5; .5 SOLUTION RESPIRATORY (INHALATION) at 01:16

## 2021-01-28 RX ADMIN — IPRATROPIUM BROMIDE AND ALBUTEROL SULFATE 3 ML: 2.5; .5 SOLUTION RESPIRATORY (INHALATION) at 19:56

## 2021-01-29 ENCOUNTER — APPOINTMENT (OUTPATIENT)
Dept: GENERAL RADIOLOGY | Facility: HOSPITAL | Age: 46
End: 2021-01-29

## 2021-01-29 LAB
ALBUMIN SERPL-MCNC: 2.4 G/DL (ref 3.5–5.2)
ANION GAP SERPL CALCULATED.3IONS-SCNC: 9 MMOL/L (ref 5–15)
BASOPHILS # BLD AUTO: 0.05 10*3/MM3 (ref 0–0.2)
BASOPHILS NFR BLD AUTO: 0.3 % (ref 0–1.5)
BUN SERPL-MCNC: 15 MG/DL (ref 6–20)
BUN/CREAT SERPL: 21.1 (ref 7–25)
CALCIUM SPEC-SCNC: 8.8 MG/DL (ref 8.6–10.5)
CHLORIDE SERPL-SCNC: 108 MMOL/L (ref 98–107)
CO2 SERPL-SCNC: 27 MMOL/L (ref 22–29)
CREAT SERPL-MCNC: 0.71 MG/DL (ref 0.76–1.27)
DEPRECATED RDW RBC AUTO: 52.6 FL (ref 37–54)
EOSINOPHIL # BLD AUTO: 0.14 10*3/MM3 (ref 0–0.4)
EOSINOPHIL NFR BLD AUTO: 0.8 % (ref 0.3–6.2)
ERYTHROCYTE [DISTWIDTH] IN BLOOD BY AUTOMATED COUNT: 15.8 % (ref 12.3–15.4)
GFR SERPL CREATININE-BSD FRML MDRD: 145 ML/MIN/1.73
GLUCOSE BLDC GLUCOMTR-MCNC: 180 MG/DL (ref 70–130)
GLUCOSE BLDC GLUCOMTR-MCNC: 181 MG/DL (ref 70–130)
GLUCOSE BLDC GLUCOMTR-MCNC: 182 MG/DL (ref 70–130)
GLUCOSE BLDC GLUCOMTR-MCNC: 187 MG/DL (ref 70–130)
GLUCOSE SERPL-MCNC: 147 MG/DL (ref 65–99)
HCT VFR BLD AUTO: 38 % (ref 37.5–51)
HGB BLD-MCNC: 11.4 G/DL (ref 13–17.7)
IMM GRANULOCYTES # BLD AUTO: 0.13 10*3/MM3 (ref 0–0.05)
IMM GRANULOCYTES NFR BLD AUTO: 0.7 % (ref 0–0.5)
LYMPHOCYTES # BLD AUTO: 1.44 10*3/MM3 (ref 0.7–3.1)
LYMPHOCYTES NFR BLD AUTO: 8.2 % (ref 19.6–45.3)
MCH RBC QN AUTO: 27.4 PG (ref 26.6–33)
MCHC RBC AUTO-ENTMCNC: 30 G/DL (ref 31.5–35.7)
MCV RBC AUTO: 91.3 FL (ref 79–97)
MONOCYTES # BLD AUTO: 0.58 10*3/MM3 (ref 0.1–0.9)
MONOCYTES NFR BLD AUTO: 3.3 % (ref 5–12)
NEUTROPHILS NFR BLD AUTO: 15.31 10*3/MM3 (ref 1.7–7)
NEUTROPHILS NFR BLD AUTO: 86.7 % (ref 42.7–76)
NRBC BLD AUTO-RTO: 0.2 /100 WBC (ref 0–0.2)
PHOSPHATE SERPL-MCNC: 2.5 MG/DL (ref 2.5–4.5)
PLATELET # BLD AUTO: 287 10*3/MM3 (ref 140–450)
PMV BLD AUTO: 12.1 FL (ref 6–12)
POTASSIUM SERPL-SCNC: 4.1 MMOL/L (ref 3.5–5.2)
RBC # BLD AUTO: 4.16 10*6/MM3 (ref 4.14–5.8)
SODIUM SERPL-SCNC: 144 MMOL/L (ref 136–145)
WBC # BLD AUTO: 17.65 10*3/MM3 (ref 3.4–10.8)

## 2021-01-29 PROCEDURE — 94003 VENT MGMT INPAT SUBQ DAY: CPT

## 2021-01-29 PROCEDURE — 94799 UNLISTED PULMONARY SVC/PX: CPT

## 2021-01-29 PROCEDURE — 94640 AIRWAY INHALATION TREATMENT: CPT

## 2021-01-29 PROCEDURE — 80069 RENAL FUNCTION PANEL: CPT | Performed by: INTERNAL MEDICINE

## 2021-01-29 PROCEDURE — 99222 1ST HOSP IP/OBS MODERATE 55: CPT | Performed by: PHYSICIAN ASSISTANT

## 2021-01-29 PROCEDURE — 25010000002 ENOXAPARIN PER 10 MG: Performed by: FAMILY MEDICINE

## 2021-01-29 PROCEDURE — 82962 GLUCOSE BLOOD TEST: CPT

## 2021-01-29 PROCEDURE — 25010000002 METHYLNALTREXONE 12 MG/0.6ML SOLUTION: Performed by: INTERNAL MEDICINE

## 2021-01-29 PROCEDURE — 99291 CRITICAL CARE FIRST HOUR: CPT | Performed by: INTERNAL MEDICINE

## 2021-01-29 PROCEDURE — 25010000002 FENTANYL CITRATE (PF) 2500 MCG/50ML SOLUTION: Performed by: INTERNAL MEDICINE

## 2021-01-29 PROCEDURE — 25010000002 MEROPENEM PER 100 MG: Performed by: INTERNAL MEDICINE

## 2021-01-29 PROCEDURE — 25010000002 MICAFUNGIN SODIUM 100 MG RECONSTITUTED SOLUTION: Performed by: INTERNAL MEDICINE

## 2021-01-29 PROCEDURE — 71045 X-RAY EXAM CHEST 1 VIEW: CPT

## 2021-01-29 PROCEDURE — 85025 COMPLETE CBC W/AUTO DIFF WBC: CPT | Performed by: INTERNAL MEDICINE

## 2021-01-29 RX ORDER — OXYCODONE HYDROCHLORIDE 15 MG/1
15 TABLET ORAL EVERY 6 HOURS
Status: DISCONTINUED | OUTPATIENT
Start: 2021-01-29 | End: 2021-01-30

## 2021-01-29 RX ORDER — MIDAZOLAM HYDROCHLORIDE 1 MG/ML
5 INJECTION INTRAMUSCULAR; INTRAVENOUS
Status: DISCONTINUED | OUTPATIENT
Start: 2021-01-29 | End: 2021-02-08

## 2021-01-29 RX ORDER — BISACODYL 10 MG
10 SUPPOSITORY, RECTAL RECTAL ONCE
Status: COMPLETED | OUTPATIENT
Start: 2021-01-29 | End: 2021-01-29

## 2021-01-29 RX ADMIN — IPRATROPIUM BROMIDE AND ALBUTEROL SULFATE 3 ML: 2.5; .5 SOLUTION RESPIRATORY (INHALATION) at 18:51

## 2021-01-29 RX ADMIN — MEROPENEM 2 G: 1 INJECTION, POWDER, FOR SOLUTION INTRAVENOUS at 17:30

## 2021-01-29 RX ADMIN — IPRATROPIUM BROMIDE AND ALBUTEROL SULFATE 3 ML: 2.5; .5 SOLUTION RESPIRATORY (INHALATION) at 00:42

## 2021-01-29 RX ADMIN — OXYCODONE HYDROCHLORIDE 15 MG: 15 TABLET ORAL at 10:56

## 2021-01-29 RX ADMIN — FENTANYL CITRATE 225 MCG/HR: 0.05 INJECTION, SOLUTION INTRAMUSCULAR; INTRAVENOUS at 12:19

## 2021-01-29 RX ADMIN — SENNOSIDES 10 ML: 8.8 LIQUID ORAL at 21:03

## 2021-01-29 RX ADMIN — CLONIDINE HYDROCHLORIDE 0.1 MG: 0.1 TABLET ORAL at 08:16

## 2021-01-29 RX ADMIN — FENTANYL CITRATE 300 MCG/HR: 0.05 INJECTION, SOLUTION INTRAMUSCULAR; INTRAVENOUS at 21:36

## 2021-01-29 RX ADMIN — OXYCODONE HYDROCHLORIDE 15 MG: 15 TABLET ORAL at 17:30

## 2021-01-29 RX ADMIN — QUETIAPINE 50 MG: 25 TABLET, FILM COATED ORAL at 13:28

## 2021-01-29 RX ADMIN — MICAFUNGIN SODIUM 100 MG: 100 INJECTION, POWDER, LYOPHILIZED, FOR SOLUTION INTRAVENOUS at 10:01

## 2021-01-29 RX ADMIN — IPRATROPIUM BROMIDE AND ALBUTEROL SULFATE 3 ML: 2.5; .5 SOLUTION RESPIRATORY (INHALATION) at 12:12

## 2021-01-29 RX ADMIN — METOPROLOL TARTRATE 25 MG: 25 TABLET, FILM COATED ORAL at 08:16

## 2021-01-29 RX ADMIN — DEXMEDETOMIDINE HYDROCHLORIDE 1.1 MCG/KG/HR: 400 INJECTION, SOLUTION INTRAVENOUS at 01:20

## 2021-01-29 RX ADMIN — CLONIDINE HYDROCHLORIDE 0.1 MG: 0.1 TABLET ORAL at 21:03

## 2021-01-29 RX ADMIN — DEXMEDETOMIDINE HYDROCHLORIDE 1.2 MCG/KG/HR: 400 INJECTION, SOLUTION INTRAVENOUS at 10:56

## 2021-01-29 RX ADMIN — CHLORHEXIDINE GLUCONATE 0.12% ORAL RINSE 15 ML: 1.2 LIQUID ORAL at 08:16

## 2021-01-29 RX ADMIN — DEXMEDETOMIDINE 1.2 MCG/KG/HR: 100 INJECTION, SOLUTION, CONCENTRATE INTRAVENOUS at 13:16

## 2021-01-29 RX ADMIN — DEXMEDETOMIDINE 1.2 MCG/KG/HR: 100 INJECTION, SOLUTION, CONCENTRATE INTRAVENOUS at 18:56

## 2021-01-29 RX ADMIN — OXYCODONE HYDROCHLORIDE 15 MG: 15 TABLET ORAL at 22:35

## 2021-01-29 RX ADMIN — DEXMEDETOMIDINE HYDROCHLORIDE 1.2 MCG/KG/HR: 400 INJECTION, SOLUTION INTRAVENOUS at 08:16

## 2021-01-29 RX ADMIN — METHYLNALTREXONE BROMIDE 12 MG: 12 INJECTION, SOLUTION SUBCUTANEOUS at 19:48

## 2021-01-29 RX ADMIN — IPRATROPIUM BROMIDE AND ALBUTEROL SULFATE 3 ML: 2.5; .5 SOLUTION RESPIRATORY (INHALATION) at 06:46

## 2021-01-29 RX ADMIN — BISACODYL 10 MG: 10 SUPPOSITORY RECTAL at 19:48

## 2021-01-29 RX ADMIN — ENOXAPARIN SODIUM 40 MG: 40 INJECTION SUBCUTANEOUS at 08:16

## 2021-01-29 RX ADMIN — ACETAMINOPHEN ORAL SOLUTION 650 MG: 650 SOLUTION ORAL at 10:59

## 2021-01-29 RX ADMIN — IPRATROPIUM BROMIDE AND ALBUTEROL SULFATE 3 ML: 2.5; .5 SOLUTION RESPIRATORY (INHALATION) at 15:25

## 2021-01-29 RX ADMIN — MEROPENEM 2 G: 1 INJECTION, POWDER, FOR SOLUTION INTRAVENOUS at 08:15

## 2021-01-29 RX ADMIN — SODIUM CHLORIDE, PRESERVATIVE FREE 10 ML: 5 INJECTION INTRAVENOUS at 21:07

## 2021-01-29 RX ADMIN — THIAMINE HCL TAB 100 MG 100 MG: 100 TAB at 08:16

## 2021-01-29 RX ADMIN — FOLIC ACID 1 MG: 5 INJECTION, SOLUTION INTRAMUSCULAR; INTRAVENOUS; SUBCUTANEOUS at 08:16

## 2021-01-29 RX ADMIN — PANTOPRAZOLE SODIUM 40 MG: 40 INJECTION, POWDER, LYOPHILIZED, FOR SOLUTION INTRAVENOUS at 05:10

## 2021-01-29 RX ADMIN — QUETIAPINE 50 MG: 25 TABLET, FILM COATED ORAL at 05:10

## 2021-01-29 RX ADMIN — CHLORHEXIDINE GLUCONATE 0.12% ORAL RINSE 15 ML: 1.2 LIQUID ORAL at 20:51

## 2021-01-29 RX ADMIN — DEXMEDETOMIDINE HYDROCHLORIDE 1.2 MCG/KG/HR: 400 INJECTION, SOLUTION INTRAVENOUS at 05:46

## 2021-01-29 RX ADMIN — DEXMEDETOMIDINE HYDROCHLORIDE 1.2 MCG/KG/HR: 400 INJECTION, SOLUTION INTRAVENOUS at 03:40

## 2021-01-29 RX ADMIN — FENTANYL CITRATE 225 MCG/HR: 0.05 INJECTION, SOLUTION INTRAMUSCULAR; INTRAVENOUS at 01:19

## 2021-01-29 RX ADMIN — SODIUM CHLORIDE, PRESERVATIVE FREE 10 ML: 5 INJECTION INTRAVENOUS at 08:17

## 2021-01-29 RX ADMIN — ACETAMINOPHEN ORAL SOLUTION 650 MG: 650 SOLUTION ORAL at 04:35

## 2021-01-29 RX ADMIN — ESCITALOPRAM OXALATE 20 MG: 20 TABLET ORAL at 08:16

## 2021-01-29 RX ADMIN — QUETIAPINE 50 MG: 25 TABLET, FILM COATED ORAL at 21:03

## 2021-01-29 RX ADMIN — METOPROLOL TARTRATE 25 MG: 25 TABLET, FILM COATED ORAL at 21:03

## 2021-01-29 RX ADMIN — IPRATROPIUM BROMIDE AND ALBUTEROL SULFATE 3 ML: 2.5; .5 SOLUTION RESPIRATORY (INHALATION) at 04:23

## 2021-01-29 RX ADMIN — ENOXAPARIN SODIUM 40 MG: 40 INJECTION SUBCUTANEOUS at 20:51

## 2021-01-30 ENCOUNTER — APPOINTMENT (OUTPATIENT)
Dept: GENERAL RADIOLOGY | Facility: HOSPITAL | Age: 46
End: 2021-01-30

## 2021-01-30 LAB
ALBUMIN SERPL-MCNC: 2.1 G/DL (ref 3.5–5.2)
ANION GAP SERPL CALCULATED.3IONS-SCNC: 11 MMOL/L (ref 5–15)
BASOPHILS # BLD AUTO: 0.04 10*3/MM3 (ref 0–0.2)
BASOPHILS NFR BLD AUTO: 0.2 % (ref 0–1.5)
BUN SERPL-MCNC: 19 MG/DL (ref 6–20)
BUN/CREAT SERPL: 32.2 (ref 7–25)
CALCIUM SPEC-SCNC: 9.2 MG/DL (ref 8.6–10.5)
CHLORIDE SERPL-SCNC: 106 MMOL/L (ref 98–107)
CO2 SERPL-SCNC: 24 MMOL/L (ref 22–29)
CREAT SERPL-MCNC: 0.59 MG/DL (ref 0.76–1.27)
D-LACTATE SERPL-SCNC: 1.7 MMOL/L (ref 0.5–2)
DEPRECATED RDW RBC AUTO: 50.2 FL (ref 37–54)
EOSINOPHIL # BLD AUTO: 0.09 10*3/MM3 (ref 0–0.4)
EOSINOPHIL NFR BLD AUTO: 0.5 % (ref 0.3–6.2)
ERYTHROCYTE [DISTWIDTH] IN BLOOD BY AUTOMATED COUNT: 15.4 % (ref 12.3–15.4)
GFR SERPL CREATININE-BSD FRML MDRD: >150 ML/MIN/1.73
GLUCOSE BLDC GLUCOMTR-MCNC: 108 MG/DL (ref 70–130)
GLUCOSE BLDC GLUCOMTR-MCNC: 116 MG/DL (ref 70–130)
GLUCOSE BLDC GLUCOMTR-MCNC: 129 MG/DL (ref 70–130)
GLUCOSE BLDC GLUCOMTR-MCNC: 132 MG/DL (ref 70–130)
GLUCOSE SERPL-MCNC: 151 MG/DL (ref 65–99)
HCT VFR BLD AUTO: 38.6 % (ref 37.5–51)
HGB BLD-MCNC: 11.6 G/DL (ref 13–17.7)
IMM GRANULOCYTES # BLD AUTO: 0.15 10*3/MM3 (ref 0–0.05)
IMM GRANULOCYTES NFR BLD AUTO: 0.9 % (ref 0–0.5)
LYMPHOCYTES # BLD AUTO: 1.33 10*3/MM3 (ref 0.7–3.1)
LYMPHOCYTES NFR BLD AUTO: 7.8 % (ref 19.6–45.3)
MCH RBC QN AUTO: 26.6 PG (ref 26.6–33)
MCHC RBC AUTO-ENTMCNC: 30.1 G/DL (ref 31.5–35.7)
MCV RBC AUTO: 88.5 FL (ref 79–97)
MONOCYTES # BLD AUTO: 0.67 10*3/MM3 (ref 0.1–0.9)
MONOCYTES NFR BLD AUTO: 3.9 % (ref 5–12)
NEUTROPHILS NFR BLD AUTO: 14.81 10*3/MM3 (ref 1.7–7)
NEUTROPHILS NFR BLD AUTO: 86.7 % (ref 42.7–76)
NRBC BLD AUTO-RTO: 0.1 /100 WBC (ref 0–0.2)
PHOSPHATE SERPL-MCNC: 2.9 MG/DL (ref 2.5–4.5)
PLATELET # BLD AUTO: 302 10*3/MM3 (ref 140–450)
PMV BLD AUTO: 12.6 FL (ref 6–12)
POTASSIUM SERPL-SCNC: 4.6 MMOL/L (ref 3.5–5.2)
RBC # BLD AUTO: 4.36 10*6/MM3 (ref 4.14–5.8)
SODIUM SERPL-SCNC: 141 MMOL/L (ref 136–145)
WBC # BLD AUTO: 17.09 10*3/MM3 (ref 3.4–10.8)

## 2021-01-30 PROCEDURE — 94799 UNLISTED PULMONARY SVC/PX: CPT

## 2021-01-30 PROCEDURE — 25010000002 FUROSEMIDE PER 20 MG: Performed by: INTERNAL MEDICINE

## 2021-01-30 PROCEDURE — 25010000002 METHYLNALTREXONE 12 MG/0.6ML SOLUTION: Performed by: INTERNAL MEDICINE

## 2021-01-30 PROCEDURE — 94003 VENT MGMT INPAT SUBQ DAY: CPT

## 2021-01-30 PROCEDURE — 74018 RADEX ABDOMEN 1 VIEW: CPT

## 2021-01-30 PROCEDURE — 25010000002 MIDAZOLAM PER 1 MG: Performed by: INTERNAL MEDICINE

## 2021-01-30 PROCEDURE — 80069 RENAL FUNCTION PANEL: CPT | Performed by: INTERNAL MEDICINE

## 2021-01-30 PROCEDURE — 25010000002 MEROPENEM PER 100 MG: Performed by: INTERNAL MEDICINE

## 2021-01-30 PROCEDURE — 82962 GLUCOSE BLOOD TEST: CPT

## 2021-01-30 PROCEDURE — 99291 CRITICAL CARE FIRST HOUR: CPT | Performed by: INTERNAL MEDICINE

## 2021-01-30 PROCEDURE — 85025 COMPLETE CBC W/AUTO DIFF WBC: CPT | Performed by: INTERNAL MEDICINE

## 2021-01-30 PROCEDURE — 25010000002 MICAFUNGIN SODIUM 100 MG RECONSTITUTED SOLUTION: Performed by: INTERNAL MEDICINE

## 2021-01-30 PROCEDURE — 99232 SBSQ HOSP IP/OBS MODERATE 35: CPT | Performed by: INTERNAL MEDICINE

## 2021-01-30 PROCEDURE — 83605 ASSAY OF LACTIC ACID: CPT | Performed by: INTERNAL MEDICINE

## 2021-01-30 PROCEDURE — 25010000002 ENOXAPARIN PER 10 MG: Performed by: FAMILY MEDICINE

## 2021-01-30 PROCEDURE — 71045 X-RAY EXAM CHEST 1 VIEW: CPT

## 2021-01-30 PROCEDURE — 25010000002 FENTANYL CITRATE (PF) 2500 MCG/50ML SOLUTION: Performed by: INTERNAL MEDICINE

## 2021-01-30 RX ORDER — SIMETHICONE 20 MG/.3ML
120 EMULSION ORAL 4 TIMES DAILY PRN
Status: DISCONTINUED | OUTPATIENT
Start: 2021-01-30 | End: 2021-02-11 | Stop reason: HOSPADM

## 2021-01-30 RX ORDER — OXYCODONE HYDROCHLORIDE 15 MG/1
15 TABLET ORAL EVERY 6 HOURS
Status: DISCONTINUED | OUTPATIENT
Start: 2021-01-30 | End: 2021-02-04

## 2021-01-30 RX ORDER — BISACODYL 10 MG
10 SUPPOSITORY, RECTAL RECTAL DAILY
Status: DISCONTINUED | OUTPATIENT
Start: 2021-01-30 | End: 2021-02-11

## 2021-01-30 RX ORDER — FOLIC ACID 1 MG/1
1 TABLET ORAL DAILY
Status: DISCONTINUED | OUTPATIENT
Start: 2021-01-31 | End: 2021-02-11

## 2021-01-30 RX ORDER — FUROSEMIDE 10 MG/ML
40 INJECTION INTRAMUSCULAR; INTRAVENOUS ONCE
Status: COMPLETED | OUTPATIENT
Start: 2021-01-30 | End: 2021-01-30

## 2021-01-30 RX ADMIN — IPRATROPIUM BROMIDE AND ALBUTEROL SULFATE 3 ML: 2.5; .5 SOLUTION RESPIRATORY (INHALATION) at 22:33

## 2021-01-30 RX ADMIN — OXYCODONE HYDROCHLORIDE 15 MG: 15 TABLET ORAL at 12:02

## 2021-01-30 RX ADMIN — FUROSEMIDE 40 MG: 10 INJECTION INTRAMUSCULAR; INTRAVENOUS at 14:20

## 2021-01-30 RX ADMIN — MEROPENEM 2 G: 1 INJECTION, POWDER, FOR SOLUTION INTRAVENOUS at 23:52

## 2021-01-30 RX ADMIN — DEXMEDETOMIDINE 1.5 MCG/KG/HR: 100 INJECTION, SOLUTION, CONCENTRATE INTRAVENOUS at 22:42

## 2021-01-30 RX ADMIN — IPRATROPIUM BROMIDE AND ALBUTEROL SULFATE 3 ML: 2.5; .5 SOLUTION RESPIRATORY (INHALATION) at 11:10

## 2021-01-30 RX ADMIN — MEROPENEM 2 G: 1 INJECTION, POWDER, FOR SOLUTION INTRAVENOUS at 00:27

## 2021-01-30 RX ADMIN — ENOXAPARIN SODIUM 40 MG: 40 INJECTION SUBCUTANEOUS at 08:42

## 2021-01-30 RX ADMIN — IPRATROPIUM BROMIDE AND ALBUTEROL SULFATE 3 ML: 2.5; .5 SOLUTION RESPIRATORY (INHALATION) at 16:20

## 2021-01-30 RX ADMIN — FENTANYL CITRATE 200 MCG/HR: 0.05 INJECTION, SOLUTION INTRAMUSCULAR; INTRAVENOUS at 15:09

## 2021-01-30 RX ADMIN — MEROPENEM 2 G: 1 INJECTION, POWDER, FOR SOLUTION INTRAVENOUS at 15:42

## 2021-01-30 RX ADMIN — THIAMINE HCL TAB 100 MG 100 MG: 100 TAB at 08:42

## 2021-01-30 RX ADMIN — QUETIAPINE 50 MG: 25 TABLET, FILM COATED ORAL at 05:49

## 2021-01-30 RX ADMIN — IPRATROPIUM BROMIDE AND ALBUTEROL SULFATE 3 ML: 2.5; .5 SOLUTION RESPIRATORY (INHALATION) at 00:21

## 2021-01-30 RX ADMIN — QUETIAPINE 50 MG: 25 TABLET, FILM COATED ORAL at 14:20

## 2021-01-30 RX ADMIN — PANTOPRAZOLE SODIUM 40 MG: 40 INJECTION, POWDER, LYOPHILIZED, FOR SOLUTION INTRAVENOUS at 05:49

## 2021-01-30 RX ADMIN — IPRATROPIUM BROMIDE AND ALBUTEROL SULFATE 3 ML: 2.5; .5 SOLUTION RESPIRATORY (INHALATION) at 02:45

## 2021-01-30 RX ADMIN — DEXMEDETOMIDINE 1.4 MCG/KG/HR: 100 INJECTION, SOLUTION, CONCENTRATE INTRAVENOUS at 15:42

## 2021-01-30 RX ADMIN — CLONIDINE HYDROCHLORIDE 0.1 MG: 0.1 TABLET ORAL at 21:07

## 2021-01-30 RX ADMIN — DEXMEDETOMIDINE 1.4 MCG/KG/HR: 100 INJECTION, SOLUTION, CONCENTRATE INTRAVENOUS at 00:36

## 2021-01-30 RX ADMIN — MICAFUNGIN SODIUM 100 MG: 100 INJECTION, POWDER, LYOPHILIZED, FOR SOLUTION INTRAVENOUS at 08:42

## 2021-01-30 RX ADMIN — MIDAZOLAM 5 MG: 1 INJECTION INTRAMUSCULAR; INTRAVENOUS at 20:34

## 2021-01-30 RX ADMIN — SENNOSIDES 10 ML: 8.8 LIQUID ORAL at 21:06

## 2021-01-30 RX ADMIN — ACETAMINOPHEN ORAL SOLUTION 649.6 MG: 650 SOLUTION ORAL at 12:01

## 2021-01-30 RX ADMIN — MEROPENEM 2 G: 1 INJECTION, POWDER, FOR SOLUTION INTRAVENOUS at 08:42

## 2021-01-30 RX ADMIN — ENOXAPARIN SODIUM 40 MG: 40 INJECTION SUBCUTANEOUS at 21:08

## 2021-01-30 RX ADMIN — CHLORHEXIDINE GLUCONATE 0.12% ORAL RINSE 15 ML: 1.2 LIQUID ORAL at 21:06

## 2021-01-30 RX ADMIN — QUETIAPINE 50 MG: 25 TABLET, FILM COATED ORAL at 21:07

## 2021-01-30 RX ADMIN — CHLORHEXIDINE GLUCONATE 0.12% ORAL RINSE 15 ML: 1.2 LIQUID ORAL at 08:42

## 2021-01-30 RX ADMIN — METOPROLOL TARTRATE 25 MG: 25 TABLET, FILM COATED ORAL at 21:07

## 2021-01-30 RX ADMIN — IPRATROPIUM BROMIDE AND ALBUTEROL SULFATE 3 ML: 2.5; .5 SOLUTION RESPIRATORY (INHALATION) at 07:55

## 2021-01-30 RX ADMIN — CLONIDINE HYDROCHLORIDE 0.1 MG: 0.1 TABLET ORAL at 08:42

## 2021-01-30 RX ADMIN — OXYCODONE HYDROCHLORIDE 15 MG: 15 TABLET ORAL at 05:49

## 2021-01-30 RX ADMIN — SODIUM CHLORIDE, PRESERVATIVE FREE 10 ML: 5 INJECTION INTRAVENOUS at 08:43

## 2021-01-30 RX ADMIN — METOPROLOL TARTRATE 25 MG: 25 TABLET, FILM COATED ORAL at 08:42

## 2021-01-30 RX ADMIN — SODIUM CHLORIDE, PRESERVATIVE FREE 10 ML: 5 INJECTION INTRAVENOUS at 21:09

## 2021-01-30 RX ADMIN — FOLIC ACID 1 MG: 5 INJECTION, SOLUTION INTRAMUSCULAR; INTRAVENOUS; SUBCUTANEOUS at 08:42

## 2021-01-30 RX ADMIN — FENTANYL CITRATE 300 MCG/HR: 0.05 INJECTION, SOLUTION INTRAMUSCULAR; INTRAVENOUS at 05:48

## 2021-01-30 RX ADMIN — BISACODYL 10 MG: 10 SUPPOSITORY RECTAL at 21:08

## 2021-01-30 RX ADMIN — OXYCODONE HYDROCHLORIDE 15 MG: 15 TABLET ORAL at 18:02

## 2021-01-30 RX ADMIN — OXYCODONE HYDROCHLORIDE 15 MG: 15 TABLET ORAL at 23:52

## 2021-01-30 RX ADMIN — METHYLNALTREXONE BROMIDE 12 MG: 12 INJECTION, SOLUTION SUBCUTANEOUS at 21:06

## 2021-01-30 RX ADMIN — ACETAMINOPHEN ORAL SOLUTION 650 MG: 650 SOLUTION ORAL at 21:06

## 2021-01-30 RX ADMIN — DEXMEDETOMIDINE 1.4 MCG/KG/HR: 100 INJECTION, SOLUTION, CONCENTRATE INTRAVENOUS at 10:41

## 2021-01-30 RX ADMIN — IPRATROPIUM BROMIDE AND ALBUTEROL SULFATE 3 ML: 2.5; .5 SOLUTION RESPIRATORY (INHALATION) at 18:33

## 2021-01-30 RX ADMIN — ESCITALOPRAM OXALATE 20 MG: 20 TABLET ORAL at 08:42

## 2021-01-30 RX ADMIN — DEXMEDETOMIDINE 1.4 MCG/KG/HR: 100 INJECTION, SOLUTION, CONCENTRATE INTRAVENOUS at 05:48

## 2021-01-31 ENCOUNTER — APPOINTMENT (OUTPATIENT)
Dept: GENERAL RADIOLOGY | Facility: HOSPITAL | Age: 46
End: 2021-01-31

## 2021-01-31 LAB
ALBUMIN SERPL-MCNC: 2.1 G/DL (ref 3.5–5.2)
ANION GAP SERPL CALCULATED.3IONS-SCNC: 8 MMOL/L (ref 5–15)
BASOPHILS # BLD AUTO: 0.03 10*3/MM3 (ref 0–0.2)
BASOPHILS NFR BLD AUTO: 0.2 % (ref 0–1.5)
BUN SERPL-MCNC: 20 MG/DL (ref 6–20)
BUN/CREAT SERPL: 30.8 (ref 7–25)
CALCIUM SPEC-SCNC: 9.1 MG/DL (ref 8.6–10.5)
CHLORIDE SERPL-SCNC: 105 MMOL/L (ref 98–107)
CO2 SERPL-SCNC: 28 MMOL/L (ref 22–29)
CREAT SERPL-MCNC: 0.65 MG/DL (ref 0.76–1.27)
DEPRECATED RDW RBC AUTO: 51.1 FL (ref 37–54)
EOSINOPHIL # BLD AUTO: 0.1 10*3/MM3 (ref 0–0.4)
EOSINOPHIL NFR BLD AUTO: 0.8 % (ref 0.3–6.2)
ERYTHROCYTE [DISTWIDTH] IN BLOOD BY AUTOMATED COUNT: 15.4 % (ref 12.3–15.4)
GFR SERPL CREATININE-BSD FRML MDRD: >150 ML/MIN/1.73
GLUCOSE BLDC GLUCOMTR-MCNC: 100 MG/DL (ref 70–130)
GLUCOSE BLDC GLUCOMTR-MCNC: 112 MG/DL (ref 70–130)
GLUCOSE BLDC GLUCOMTR-MCNC: 121 MG/DL (ref 70–130)
GLUCOSE BLDC GLUCOMTR-MCNC: 138 MG/DL (ref 70–130)
GLUCOSE SERPL-MCNC: 123 MG/DL (ref 65–99)
HCT VFR BLD AUTO: 37.9 % (ref 37.5–51)
HGB BLD-MCNC: 11 G/DL (ref 13–17.7)
IMM GRANULOCYTES # BLD AUTO: 0.07 10*3/MM3 (ref 0–0.05)
IMM GRANULOCYTES NFR BLD AUTO: 0.6 % (ref 0–0.5)
LYMPHOCYTES # BLD AUTO: 1 10*3/MM3 (ref 0.7–3.1)
LYMPHOCYTES NFR BLD AUTO: 8.1 % (ref 19.6–45.3)
MCH RBC QN AUTO: 26.2 PG (ref 26.6–33)
MCHC RBC AUTO-ENTMCNC: 29 G/DL (ref 31.5–35.7)
MCV RBC AUTO: 90.2 FL (ref 79–97)
MONOCYTES # BLD AUTO: 0.58 10*3/MM3 (ref 0.1–0.9)
MONOCYTES NFR BLD AUTO: 4.7 % (ref 5–12)
NEUTROPHILS NFR BLD AUTO: 10.51 10*3/MM3 (ref 1.7–7)
NEUTROPHILS NFR BLD AUTO: 85.6 % (ref 42.7–76)
NRBC BLD AUTO-RTO: 0 /100 WBC (ref 0–0.2)
PHOSPHATE SERPL-MCNC: 3.3 MG/DL (ref 2.5–4.5)
PLATELET # BLD AUTO: 336 10*3/MM3 (ref 140–450)
PMV BLD AUTO: 12.4 FL (ref 6–12)
POTASSIUM SERPL-SCNC: 4.4 MMOL/L (ref 3.5–5.2)
RBC # BLD AUTO: 4.2 10*6/MM3 (ref 4.14–5.8)
SODIUM SERPL-SCNC: 141 MMOL/L (ref 136–145)
WBC # BLD AUTO: 12.29 10*3/MM3 (ref 3.4–10.8)

## 2021-01-31 PROCEDURE — 99291 CRITICAL CARE FIRST HOUR: CPT | Performed by: INTERNAL MEDICINE

## 2021-01-31 PROCEDURE — 94799 UNLISTED PULMONARY SVC/PX: CPT

## 2021-01-31 PROCEDURE — 25010000002 DIAZEPAM PER 5 MG: Performed by: INTERNAL MEDICINE

## 2021-01-31 PROCEDURE — 25010000002 MEROPENEM PER 100 MG: Performed by: INTERNAL MEDICINE

## 2021-01-31 PROCEDURE — 85025 COMPLETE CBC W/AUTO DIFF WBC: CPT | Performed by: INTERNAL MEDICINE

## 2021-01-31 PROCEDURE — 82962 GLUCOSE BLOOD TEST: CPT

## 2021-01-31 PROCEDURE — 25010000002 METHYLNALTREXONE 12 MG/0.6ML SOLUTION: Performed by: INTERNAL MEDICINE

## 2021-01-31 PROCEDURE — 94003 VENT MGMT INPAT SUBQ DAY: CPT

## 2021-01-31 PROCEDURE — 25010000002 FENTANYL CITRATE (PF) 2500 MCG/50ML SOLUTION: Performed by: INTERNAL MEDICINE

## 2021-01-31 PROCEDURE — 25010000002 MIDAZOLAM PER 1 MG: Performed by: INTERNAL MEDICINE

## 2021-01-31 PROCEDURE — 25010000002 LORAZEPAM PER 2 MG: Performed by: INTERNAL MEDICINE

## 2021-01-31 PROCEDURE — 80069 RENAL FUNCTION PANEL: CPT | Performed by: INTERNAL MEDICINE

## 2021-01-31 PROCEDURE — 71045 X-RAY EXAM CHEST 1 VIEW: CPT

## 2021-01-31 PROCEDURE — 25010000002 FUROSEMIDE PER 20 MG: Performed by: INTERNAL MEDICINE

## 2021-01-31 PROCEDURE — 25010000002 MICAFUNGIN SODIUM 100 MG RECONSTITUTED SOLUTION: Performed by: INTERNAL MEDICINE

## 2021-01-31 PROCEDURE — 25010000002 ENOXAPARIN PER 10 MG: Performed by: FAMILY MEDICINE

## 2021-01-31 RX ORDER — DIAZEPAM 5 MG/ML
5 INJECTION, SOLUTION INTRAMUSCULAR; INTRAVENOUS EVERY 6 HOURS PRN
Status: DISCONTINUED | OUTPATIENT
Start: 2021-01-31 | End: 2021-02-01

## 2021-01-31 RX ORDER — FUROSEMIDE 10 MG/ML
40 INJECTION INTRAMUSCULAR; INTRAVENOUS ONCE
Status: COMPLETED | OUTPATIENT
Start: 2021-01-31 | End: 2021-01-31

## 2021-01-31 RX ORDER — LORAZEPAM 2 MG/ML
2 INJECTION INTRAMUSCULAR
Status: DISCONTINUED | OUTPATIENT
Start: 2021-01-31 | End: 2021-02-08

## 2021-01-31 RX ADMIN — MIDAZOLAM 5 MG: 1 INJECTION INTRAMUSCULAR; INTRAVENOUS at 01:57

## 2021-01-31 RX ADMIN — QUETIAPINE 50 MG: 25 TABLET, FILM COATED ORAL at 13:37

## 2021-01-31 RX ADMIN — ENOXAPARIN SODIUM 40 MG: 40 INJECTION SUBCUTANEOUS at 08:57

## 2021-01-31 RX ADMIN — IPRATROPIUM BROMIDE AND ALBUTEROL SULFATE 3 ML: 2.5; .5 SOLUTION RESPIRATORY (INHALATION) at 02:52

## 2021-01-31 RX ADMIN — IPRATROPIUM BROMIDE AND ALBUTEROL SULFATE 3 ML: 2.5; .5 SOLUTION RESPIRATORY (INHALATION) at 14:42

## 2021-01-31 RX ADMIN — DIAZEPAM 5 MG: 5 INJECTION, SOLUTION INTRAMUSCULAR; INTRAVENOUS at 21:27

## 2021-01-31 RX ADMIN — SENNOSIDES 10 ML: 8.8 LIQUID ORAL at 21:48

## 2021-01-31 RX ADMIN — DEXMEDETOMIDINE 1.5 MCG/KG/HR: 100 INJECTION, SOLUTION, CONCENTRATE INTRAVENOUS at 20:55

## 2021-01-31 RX ADMIN — OXYCODONE HYDROCHLORIDE 15 MG: 15 TABLET ORAL at 06:00

## 2021-01-31 RX ADMIN — CLONIDINE HYDROCHLORIDE 0.1 MG: 0.1 TABLET ORAL at 09:01

## 2021-01-31 RX ADMIN — FUROSEMIDE 40 MG: 10 INJECTION INTRAMUSCULAR; INTRAVENOUS at 17:25

## 2021-01-31 RX ADMIN — SIMETHICONE 120 MG: 20 EMULSION ORAL at 17:22

## 2021-01-31 RX ADMIN — MEROPENEM 2 G: 1 INJECTION, POWDER, FOR SOLUTION INTRAVENOUS at 23:15

## 2021-01-31 RX ADMIN — CHLORHEXIDINE GLUCONATE 0.12% ORAL RINSE 15 ML: 1.2 LIQUID ORAL at 21:48

## 2021-01-31 RX ADMIN — CHLORHEXIDINE GLUCONATE 0.12% ORAL RINSE 15 ML: 1.2 LIQUID ORAL at 09:01

## 2021-01-31 RX ADMIN — FUROSEMIDE 40 MG: 10 INJECTION INTRAMUSCULAR; INTRAVENOUS at 09:01

## 2021-01-31 RX ADMIN — METHYLNALTREXONE BROMIDE 12 MG: 12 INJECTION, SOLUTION SUBCUTANEOUS at 09:01

## 2021-01-31 RX ADMIN — MEROPENEM 2 G: 1 INJECTION, POWDER, FOR SOLUTION INTRAVENOUS at 15:03

## 2021-01-31 RX ADMIN — OXYCODONE HYDROCHLORIDE 15 MG: 15 TABLET ORAL at 11:39

## 2021-01-31 RX ADMIN — CLONIDINE HYDROCHLORIDE 0.1 MG: 0.1 TABLET ORAL at 21:48

## 2021-01-31 RX ADMIN — FOLIC ACID 1 MG: 1 TABLET ORAL at 09:01

## 2021-01-31 RX ADMIN — MEROPENEM 2 G: 1 INJECTION, POWDER, FOR SOLUTION INTRAVENOUS at 09:02

## 2021-01-31 RX ADMIN — METOPROLOL TARTRATE 25 MG: 25 TABLET, FILM COATED ORAL at 09:01

## 2021-01-31 RX ADMIN — ACETAMINOPHEN ORAL SOLUTION 649.6 MG: 650 SOLUTION ORAL at 11:39

## 2021-01-31 RX ADMIN — ACETAMINOPHEN ORAL SOLUTION 650 MG: 650 SOLUTION ORAL at 23:24

## 2021-01-31 RX ADMIN — IPRATROPIUM BROMIDE AND ALBUTEROL SULFATE 3 ML: 2.5; .5 SOLUTION RESPIRATORY (INHALATION) at 10:28

## 2021-01-31 RX ADMIN — OXYCODONE HYDROCHLORIDE 15 MG: 15 TABLET ORAL at 23:15

## 2021-01-31 RX ADMIN — DEXMEDETOMIDINE 1.4 MCG/KG/HR: 100 INJECTION, SOLUTION, CONCENTRATE INTRAVENOUS at 09:27

## 2021-01-31 RX ADMIN — SODIUM CHLORIDE, PRESERVATIVE FREE 10 ML: 5 INJECTION INTRAVENOUS at 21:49

## 2021-01-31 RX ADMIN — QUETIAPINE 50 MG: 25 TABLET, FILM COATED ORAL at 06:00

## 2021-01-31 RX ADMIN — BISACODYL 10 MG: 10 SUPPOSITORY RECTAL at 09:01

## 2021-01-31 RX ADMIN — MICAFUNGIN SODIUM 100 MG: 100 INJECTION, POWDER, LYOPHILIZED, FOR SOLUTION INTRAVENOUS at 09:02

## 2021-01-31 RX ADMIN — THIAMINE HCL TAB 100 MG 100 MG: 100 TAB at 09:01

## 2021-01-31 RX ADMIN — SODIUM CHLORIDE, PRESERVATIVE FREE 10 ML: 5 INJECTION INTRAVENOUS at 09:02

## 2021-01-31 RX ADMIN — LORAZEPAM 2 MG: 2 INJECTION INTRAMUSCULAR; INTRAVENOUS at 22:45

## 2021-01-31 RX ADMIN — QUETIAPINE 50 MG: 25 TABLET, FILM COATED ORAL at 21:48

## 2021-01-31 RX ADMIN — PANTOPRAZOLE SODIUM 40 MG: 40 INJECTION, POWDER, LYOPHILIZED, FOR SOLUTION INTRAVENOUS at 06:00

## 2021-01-31 RX ADMIN — ACETAMINOPHEN ORAL SOLUTION 650 MG: 650 SOLUTION ORAL at 06:00

## 2021-01-31 RX ADMIN — METOPROLOL TARTRATE 25 MG: 25 TABLET, FILM COATED ORAL at 21:48

## 2021-01-31 RX ADMIN — IPRATROPIUM BROMIDE AND ALBUTEROL SULFATE 3 ML: 2.5; .5 SOLUTION RESPIRATORY (INHALATION) at 06:49

## 2021-01-31 RX ADMIN — FENTANYL CITRATE 300 MCG/HR: 0.05 INJECTION, SOLUTION INTRAMUSCULAR; INTRAVENOUS at 02:11

## 2021-01-31 RX ADMIN — IPRATROPIUM BROMIDE AND ALBUTEROL SULFATE 3 ML: 2.5; .5 SOLUTION RESPIRATORY (INHALATION) at 18:40

## 2021-01-31 RX ADMIN — ENOXAPARIN SODIUM 40 MG: 40 INJECTION SUBCUTANEOUS at 21:48

## 2021-01-31 RX ADMIN — OXYCODONE HYDROCHLORIDE 15 MG: 15 TABLET ORAL at 17:22

## 2021-01-31 RX ADMIN — ESCITALOPRAM OXALATE 20 MG: 20 TABLET ORAL at 09:01

## 2021-02-01 ENCOUNTER — APPOINTMENT (OUTPATIENT)
Dept: GENERAL RADIOLOGY | Facility: HOSPITAL | Age: 46
End: 2021-02-01

## 2021-02-01 LAB
ALBUMIN SERPL-MCNC: 2.4 G/DL (ref 3.5–5.2)
ALP SERPL-CCNC: 83 U/L (ref 39–117)
ALT SERPL W P-5'-P-CCNC: 22 U/L (ref 1–41)
ANION GAP SERPL CALCULATED.3IONS-SCNC: 11 MMOL/L (ref 5–15)
ARTERIAL PATENCY WRIST A: ABNORMAL
ARTERIAL PATENCY WRIST A: ABNORMAL
AST SERPL-CCNC: 60 U/L (ref 1–40)
ATMOSPHERIC PRESS: ABNORMAL MM[HG]
ATMOSPHERIC PRESS: ABNORMAL MM[HG]
BASE EXCESS BLDA CALC-SCNC: 0.6 MMOL/L (ref 0–2)
BASE EXCESS BLDA CALC-SCNC: 6.7 MMOL/L (ref 0–2)
BASOPHILS # BLD AUTO: 0.05 10*3/MM3 (ref 0–0.2)
BASOPHILS NFR BLD AUTO: 0.4 % (ref 0–1.5)
BDY SITE: ABNORMAL
BDY SITE: ABNORMAL
BILIRUB SERPL-MCNC: 0.3 MG/DL (ref 0–1.2)
BODY TEMPERATURE: 37 C
BODY TEMPERATURE: 37 C
BUN SERPL-MCNC: 16 MG/DL (ref 6–20)
CALCIUM SPEC-SCNC: 9.1 MG/DL (ref 8.6–10.5)
CHLORIDE SERPL-SCNC: 106 MMOL/L (ref 98–107)
CHOLEST SERPL-MCNC: 108 MG/DL (ref 0–200)
CO2 BLDA-SCNC: 33.7 MMOL/L (ref 22–33)
CO2 BLDA-SCNC: 37.1 MMOL/L (ref 22–33)
CO2 SERPL-SCNC: 30 MMOL/L (ref 22–29)
COHGB MFR BLD: 0.8 % (ref 0–2)
COHGB MFR BLD: 0.9 % (ref 0–2)
CREAT SERPL-MCNC: 0.69 MG/DL (ref 0.76–1.27)
DEPRECATED RDW RBC AUTO: 50 FL (ref 37–54)
EOSINOPHIL # BLD AUTO: 0.08 10*3/MM3 (ref 0–0.4)
EOSINOPHIL NFR BLD AUTO: 0.7 % (ref 0.3–6.2)
EPAP: 0
EPAP: 0
ERYTHROCYTE [DISTWIDTH] IN BLOOD BY AUTOMATED COUNT: 15 % (ref 12.3–15.4)
GLUCOSE BLDC GLUCOMTR-MCNC: 119 MG/DL (ref 70–130)
GLUCOSE BLDC GLUCOMTR-MCNC: 124 MG/DL (ref 70–130)
GLUCOSE BLDC GLUCOMTR-MCNC: 125 MG/DL (ref 70–130)
GLUCOSE BLDC GLUCOMTR-MCNC: 128 MG/DL (ref 70–130)
GLUCOSE SERPL-MCNC: 124 MG/DL (ref 65–99)
HCO3 BLDA-SCNC: 32.2 MMOL/L (ref 20–26)
HCO3 BLDA-SCNC: 33.9 MMOL/L (ref 20–26)
HCT VFR BLD AUTO: 36.2 % (ref 37.5–51)
HCT VFR BLD CALC: 32.8 %
HCT VFR BLD CALC: 44.1 %
HGB BLD-MCNC: 10.7 G/DL (ref 13–17.7)
HGB BLDA-MCNC: 10.7 G/DL (ref 13.5–17.5)
HGB BLDA-MCNC: 14.4 G/DL (ref 13.5–17.5)
IMM GRANULOCYTES # BLD AUTO: 0.07 10*3/MM3 (ref 0–0.05)
IMM GRANULOCYTES NFR BLD AUTO: 0.6 % (ref 0–0.5)
INHALED O2 CONCENTRATION: 33 %
INHALED O2 CONCENTRATION: 40 %
IPAP: 0
IPAP: 0
LYMPHOCYTES # BLD AUTO: 1.38 10*3/MM3 (ref 0.7–3.1)
LYMPHOCYTES NFR BLD AUTO: 12.1 % (ref 19.6–45.3)
Lab: ABNORMAL
MAGNESIUM SERPL-MCNC: 1.7 MG/DL (ref 1.6–2.6)
MCH RBC QN AUTO: 26.8 PG (ref 26.6–33)
MCHC RBC AUTO-ENTMCNC: 29.6 G/DL (ref 31.5–35.7)
MCV RBC AUTO: 90.7 FL (ref 79–97)
METHGB BLD QL: 0.5 % (ref 0–1.5)
METHGB BLD QL: 0.7 % (ref 0–1.5)
MODALITY: ABNORMAL
MODALITY: ABNORMAL
MONOCYTES # BLD AUTO: 0.65 10*3/MM3 (ref 0.1–0.9)
MONOCYTES NFR BLD AUTO: 5.7 % (ref 5–12)
NEUTROPHILS NFR BLD AUTO: 80.5 % (ref 42.7–76)
NEUTROPHILS NFR BLD AUTO: 9.15 10*3/MM3 (ref 1.7–7)
NOTE: ABNORMAL
NOTE: ABNORMAL
NOTIFIED BY: ABNORMAL
NOTIFIED WHO: ABNORMAL
NRBC BLD AUTO-RTO: 0.2 /100 WBC (ref 0–0.2)
OXYHGB MFR BLDV: 79.6 % (ref 94–99)
OXYHGB MFR BLDV: 94.6 % (ref 94–99)
PAW @ PEAK INSP FLOW SETTING VENT: 0 CMH2O
PAW @ PEAK INSP FLOW SETTING VENT: 0 CMH2O
PCO2 BLDA: 107 MM HG (ref 35–45)
PCO2 BLDA: 49.4 MM HG (ref 35–45)
PCO2 TEMP ADJ BLD: 107 MM HG (ref 35–48)
PCO2 TEMP ADJ BLD: 49.4 MM HG (ref 35–48)
PH BLDA: 7.11 PH UNITS (ref 7.35–7.45)
PH BLDA: 7.42 PH UNITS (ref 7.35–7.45)
PH, TEMP CORRECTED: 7.11 PH UNITS
PH, TEMP CORRECTED: 7.42 PH UNITS
PHOSPHATE SERPL-MCNC: 3.5 MG/DL (ref 2.5–4.5)
PLATELET # BLD AUTO: 396 10*3/MM3 (ref 140–450)
PMV BLD AUTO: 12.1 FL (ref 6–12)
PO2 BLDA: 53.6 MM HG (ref 83–108)
PO2 BLDA: 79.9 MM HG (ref 83–108)
PO2 TEMP ADJ BLD: 53.6 MM HG (ref 83–108)
PO2 TEMP ADJ BLD: 79.9 MM HG (ref 83–108)
POTASSIUM SERPL-SCNC: 4.1 MMOL/L (ref 3.5–5.2)
PROT SERPL-MCNC: 6.5 G/DL (ref 6–8.5)
RBC # BLD AUTO: 3.99 10*6/MM3 (ref 4.14–5.8)
SODIUM SERPL-SCNC: 147 MMOL/L (ref 136–145)
TOTAL RATE: 0 BREATHS/MINUTE
TOTAL RATE: 0 BREATHS/MINUTE
TRIGL SERPL-MCNC: 411 MG/DL (ref 0–150)
WBC # BLD AUTO: 11.38 10*3/MM3 (ref 3.4–10.8)

## 2021-02-01 PROCEDURE — 84100 ASSAY OF PHOSPHORUS: CPT

## 2021-02-01 PROCEDURE — 25010000002 DIAZEPAM PER 5 MG: Performed by: INTERNAL MEDICINE

## 2021-02-01 PROCEDURE — 84478 ASSAY OF TRIGLYCERIDES: CPT

## 2021-02-01 PROCEDURE — 82962 GLUCOSE BLOOD TEST: CPT

## 2021-02-01 PROCEDURE — 83050 HGB METHEMOGLOBIN QUAN: CPT

## 2021-02-01 PROCEDURE — 25010000003 MAGNESIUM SULFATE 4 GM/100ML SOLUTION: Performed by: NURSE PRACTITIONER

## 2021-02-01 PROCEDURE — 99233 SBSQ HOSP IP/OBS HIGH 50: CPT | Performed by: INTERNAL MEDICINE

## 2021-02-01 PROCEDURE — 25010000002 ENOXAPARIN PER 10 MG: Performed by: FAMILY MEDICINE

## 2021-02-01 PROCEDURE — 83735 ASSAY OF MAGNESIUM: CPT

## 2021-02-01 PROCEDURE — 85025 COMPLETE CBC W/AUTO DIFF WBC: CPT | Performed by: INTERNAL MEDICINE

## 2021-02-01 PROCEDURE — 80053 COMPREHEN METABOLIC PANEL: CPT

## 2021-02-01 PROCEDURE — 71045 X-RAY EXAM CHEST 1 VIEW: CPT

## 2021-02-01 PROCEDURE — 25010000002 MICAFUNGIN SODIUM 100 MG RECONSTITUTED SOLUTION: Performed by: INTERNAL MEDICINE

## 2021-02-01 PROCEDURE — 82805 BLOOD GASES W/O2 SATURATION: CPT

## 2021-02-01 PROCEDURE — 25010000002 LORAZEPAM PER 2 MG: Performed by: INTERNAL MEDICINE

## 2021-02-01 PROCEDURE — 94799 UNLISTED PULMONARY SVC/PX: CPT

## 2021-02-01 PROCEDURE — 82465 ASSAY BLD/SERUM CHOLESTEROL: CPT

## 2021-02-01 PROCEDURE — 82375 ASSAY CARBOXYHB QUANT: CPT

## 2021-02-01 PROCEDURE — 25010000002 MEROPENEM PER 100 MG: Performed by: INTERNAL MEDICINE

## 2021-02-01 PROCEDURE — 36600 WITHDRAWAL OF ARTERIAL BLOOD: CPT

## 2021-02-01 RX ORDER — DIAZEPAM 5 MG/ML
5 INJECTION, SOLUTION INTRAMUSCULAR; INTRAVENOUS EVERY 6 HOURS
Status: DISCONTINUED | OUTPATIENT
Start: 2021-02-01 | End: 2021-02-05

## 2021-02-01 RX ORDER — MAGNESIUM SULFATE HEPTAHYDRATE 40 MG/ML
4 INJECTION, SOLUTION INTRAVENOUS AS NEEDED
Status: DISCONTINUED | OUTPATIENT
Start: 2021-02-01 | End: 2021-02-11 | Stop reason: HOSPADM

## 2021-02-01 RX ORDER — MAGNESIUM SULFATE HEPTAHYDRATE 40 MG/ML
2 INJECTION, SOLUTION INTRAVENOUS AS NEEDED
Status: DISCONTINUED | OUTPATIENT
Start: 2021-02-01 | End: 2021-02-11 | Stop reason: HOSPADM

## 2021-02-01 RX ADMIN — MAGNESIUM SULFATE 4 G: 4 INJECTION INTRAVENOUS at 08:52

## 2021-02-01 RX ADMIN — ACETAMINOPHEN ORAL SOLUTION 650 MG: 650 SOLUTION ORAL at 18:57

## 2021-02-01 RX ADMIN — CLONIDINE HYDROCHLORIDE 0.1 MG: 0.1 TABLET ORAL at 08:32

## 2021-02-01 RX ADMIN — DEXMEDETOMIDINE 1.5 MCG/KG/HR: 100 INJECTION, SOLUTION, CONCENTRATE INTRAVENOUS at 05:58

## 2021-02-01 RX ADMIN — LORAZEPAM 2 MG: 2 INJECTION INTRAMUSCULAR; INTRAVENOUS at 17:08

## 2021-02-01 RX ADMIN — IPRATROPIUM BROMIDE AND ALBUTEROL SULFATE 3 ML: 2.5; .5 SOLUTION RESPIRATORY (INHALATION) at 06:59

## 2021-02-01 RX ADMIN — SODIUM CHLORIDE, PRESERVATIVE FREE 10 ML: 5 INJECTION INTRAVENOUS at 08:33

## 2021-02-01 RX ADMIN — BISACODYL 10 MG: 10 SUPPOSITORY RECTAL at 08:32

## 2021-02-01 RX ADMIN — ENOXAPARIN SODIUM 40 MG: 40 INJECTION SUBCUTANEOUS at 20:20

## 2021-02-01 RX ADMIN — QUETIAPINE 50 MG: 25 TABLET, FILM COATED ORAL at 22:47

## 2021-02-01 RX ADMIN — DIAZEPAM 5 MG: 5 INJECTION, SOLUTION INTRAMUSCULAR; INTRAVENOUS at 08:21

## 2021-02-01 RX ADMIN — LORAZEPAM 2 MG: 2 INJECTION INTRAMUSCULAR; INTRAVENOUS at 15:03

## 2021-02-01 RX ADMIN — CHLORHEXIDINE GLUCONATE 0.12% ORAL RINSE 15 ML: 1.2 LIQUID ORAL at 08:32

## 2021-02-01 RX ADMIN — THIAMINE HCL TAB 100 MG 100 MG: 100 TAB at 08:32

## 2021-02-01 RX ADMIN — DIAZEPAM 5 MG: 5 INJECTION, SOLUTION INTRAMUSCULAR; INTRAVENOUS at 13:16

## 2021-02-01 RX ADMIN — ESCITALOPRAM OXALATE 20 MG: 20 TABLET ORAL at 08:32

## 2021-02-01 RX ADMIN — OXYCODONE HYDROCHLORIDE 15 MG: 15 TABLET ORAL at 17:08

## 2021-02-01 RX ADMIN — OXYCODONE HYDROCHLORIDE 15 MG: 15 TABLET ORAL at 11:40

## 2021-02-01 RX ADMIN — OXYCODONE HYDROCHLORIDE 15 MG: 15 TABLET ORAL at 05:56

## 2021-02-01 RX ADMIN — CLONIDINE HYDROCHLORIDE 0.1 MG: 0.1 TABLET ORAL at 20:20

## 2021-02-01 RX ADMIN — IPRATROPIUM BROMIDE AND ALBUTEROL SULFATE 3 ML: 2.5; .5 SOLUTION RESPIRATORY (INHALATION) at 14:26

## 2021-02-01 RX ADMIN — MEROPENEM 2 G: 1 INJECTION, POWDER, FOR SOLUTION INTRAVENOUS at 15:03

## 2021-02-01 RX ADMIN — DEXMEDETOMIDINE 1.5 MCG/KG/HR: 100 INJECTION, SOLUTION, CONCENTRATE INTRAVENOUS at 01:37

## 2021-02-01 RX ADMIN — QUETIAPINE 50 MG: 25 TABLET, FILM COATED ORAL at 13:16

## 2021-02-01 RX ADMIN — LORAZEPAM 2 MG: 2 INJECTION INTRAMUSCULAR; INTRAVENOUS at 18:57

## 2021-02-01 RX ADMIN — ACETAMINOPHEN ORAL SOLUTION 650 MG: 650 SOLUTION ORAL at 05:56

## 2021-02-01 RX ADMIN — METOPROLOL TARTRATE 25 MG: 25 TABLET, FILM COATED ORAL at 08:32

## 2021-02-01 RX ADMIN — DEXMEDETOMIDINE 1.5 MCG/KG/HR: 100 INJECTION, SOLUTION, CONCENTRATE INTRAVENOUS at 15:03

## 2021-02-01 RX ADMIN — ENOXAPARIN SODIUM 40 MG: 40 INJECTION SUBCUTANEOUS at 08:32

## 2021-02-01 RX ADMIN — LORAZEPAM 2 MG: 2 INJECTION INTRAMUSCULAR; INTRAVENOUS at 11:40

## 2021-02-01 RX ADMIN — MICAFUNGIN SODIUM 100 MG: 100 INJECTION, POWDER, LYOPHILIZED, FOR SOLUTION INTRAVENOUS at 08:56

## 2021-02-01 RX ADMIN — DIAZEPAM 5 MG: 5 INJECTION, SOLUTION INTRAMUSCULAR; INTRAVENOUS at 20:20

## 2021-02-01 RX ADMIN — SENNOSIDES 10 ML: 8.8 LIQUID ORAL at 20:19

## 2021-02-01 RX ADMIN — FOLIC ACID 1 MG: 1 TABLET ORAL at 08:32

## 2021-02-01 RX ADMIN — PANTOPRAZOLE SODIUM 40 MG: 40 INJECTION, POWDER, LYOPHILIZED, FOR SOLUTION INTRAVENOUS at 05:56

## 2021-02-01 RX ADMIN — METOPROLOL TARTRATE 25 MG: 25 TABLET, FILM COATED ORAL at 20:20

## 2021-02-01 RX ADMIN — SODIUM CHLORIDE, PRESERVATIVE FREE 10 ML: 5 INJECTION INTRAVENOUS at 20:24

## 2021-02-01 RX ADMIN — IPRATROPIUM BROMIDE AND ALBUTEROL SULFATE 3 ML: 2.5; .5 SOLUTION RESPIRATORY (INHALATION) at 12:42

## 2021-02-01 RX ADMIN — QUETIAPINE 50 MG: 25 TABLET, FILM COATED ORAL at 05:56

## 2021-02-01 RX ADMIN — DEXMEDETOMIDINE 1.5 MCG/KG/HR: 100 INJECTION, SOLUTION, CONCENTRATE INTRAVENOUS at 19:49

## 2021-02-01 RX ADMIN — MEROPENEM 2 G: 1 INJECTION, POWDER, FOR SOLUTION INTRAVENOUS at 08:33

## 2021-02-01 RX ADMIN — LORAZEPAM 2 MG: 2 INJECTION INTRAMUSCULAR; INTRAVENOUS at 08:54

## 2021-02-01 RX ADMIN — LORAZEPAM 2 MG: 2 INJECTION INTRAMUSCULAR; INTRAVENOUS at 22:34

## 2021-02-01 NOTE — PLAN OF CARE
Goal Outcome Evaluation:  Plan of Care Reviewed With: daughter  Progress: improving  Outcome Summary: Maxed on precedex, still agitated at times, restraints . Confused but communicationg more. 3 L NC, tachypenic with mild abd muscle use. PRN bipap ordered . Over 2L uop after bumex. Persistent fevers, prn tylenol.

## 2021-02-01 NOTE — PROGRESS NOTES
Continued Stay Note  [facility]     Patient Name: Titus Bro  MRN: [id number]  Today's Date: 2/1/2021    Admit Date: 1/19/2021    Discharge Plan     Row Name 02/01/21 1226       Plan    Plan  Ongoing    Plan Comments  Patient extubated yesterday, now on nasal cannula.  Patient is still confused, on precedex, in restratins and has NG with tube feedings.  Patient will beneift from PT/OT evals when appropriate.  Will discuss discharge plan when more appropriate.  CM will continue to follow.        Discharge Codes    No documentation.       Expected Discharge Date and Time     Expected Discharge Date Expected Discharge Time    Feb 8, 2021             Krystal Salmeron RN

## 2021-02-01 NOTE — PROGRESS NOTES
Clinical Nutrition   Reason For Visit: MDR, Follow-up protocol, EN    Patient Name: Titus Bro  YOB: 1975  MRN: [id number]  Date of Encounter: 02/01/21 11:28 EST  Admission date: 1/19/2021      Nutrition Assessment     Admission Problem List:  Acute pancreatitis. Alcoholic +/- hyperlipidemia induced  Alcohol abuse  LIANA, improved  Alcohol withdrawal   Hypertriglyceridemia  Fatty liver  Acute hypoxemic respiratory failure requiring NIV. ICU transfer 1/22/2021  Vent (1/24) --> extubated (1/31)  Fevers  Ileus, resolved  Hypophosphatemia, resolved  Per CT abdomen/pelvis (1/27)- pancreatic head- acute necrotic pancreatitis        Applicable PMH:  HTN  GERD  COPD  Seizure 2/2 EtOH withdrawal      Applicable Nutrition-Related Information:  (1/24) EN ordered per intensivist- Peptamen Intense VHP at 75 ml/hr and free water at 55 ml/hr via NGT  (1/25) EN rate decreased per RD 2/2 hypophosphatemia- VHP at 20 ml/hr  (1/26) EN rate increased per RD- 100 ml/hr  (1/27) Pt had residual of 500 ml this AM, EN held. NDT placed and EN restarted via NDT  (1/28) Pt tolerating EN via NDT  (1/30) free water decreased to 30 ml/hr per intensivist  (2/1) Per previous RN notes, pt had 1150 ml out NGT on (1/29), EN was then held ~1800. EN restarted at trophic rate on (1/30). EN held on (1/31) and then restarted later that day at 25 ml/hr. EN currently at 60 ml/hr and pt tolerating, advancing slowly to goal rate as pt tolerates.        Reported/Observed/Food/Nutrition Related History     Per previous RN notes, pt had 1150 ml out NGT on (1/29), EN was then held ~1800. EN restarted at trophic rate on (1/30). EN held on (1/31) and then restarted later that day at 25 ml/hr. EN currently at 60 ml/hr and pt tolerating, advancing slowly to goal rate as pt tolerates.     Per I&O over the past 24 hrs:  NGT output= 420 ml  NDT output= 300 ml  1 bowel movement      Anthropometrics   Height: 74 in  Weight: 327 lb (1/22), no method  documented  BMI: 42.0  BMI classification: Obese Class III extreme obesity: > or equal to 40kg/m2   IBW: 190 lb    Date Weight (kg) Weight (lbs) Weight Method   2/1/2021 206.341 kg  454 lb 14.4 oz  Bed scale   1/22/2021 148.326 kg 327 lb -   1/22/2021 148.4 kg 327 lb 2.6 oz -   1/19/2021 151.955 kg 335 lb -   1/19/2021 151.955 kg 335 lb Stated   6/10/2020 151.955 kg 335 lb -   8/7/2019 144.244 kg 318 lb Stated   7/17/2017 129.275 kg 285 lb Stated         Labs reviewed   Labs reviewed: Yes  Replacing Mg++    Results from last 7 days   Lab Units 02/01/21 0339 01/31/21 0636 01/30/21 0359 01/26/21  0452   SODIUM mmol/L 147* 141 141   < > 149*   POTASSIUM mmol/L 4.1 4.4 4.6   < > 4.0   CHLORIDE mmol/L 106 105 106   < > 112*   CO2 mmol/L 30.0* 28.0 24.0   < > 25.0   BUN mg/dL 16 20 19   < > 21*   CREATININE mg/dL 0.69* 0.65* 0.59*   < > 1.38*   GLUCOSE mg/dL 124* 123* 151*   < > 123*   CALCIUM mg/dL 9.1 9.1 9.2   < > 8.4*   PHOSPHORUS mg/dL 3.5 3.3 2.9   < > 3.1   MAGNESIUM mg/dL 1.7  --   --   --  2.2   CHOLESTEROL mg/dL 108  --   --   --  110   TRIGLYCERIDES mg/dL 411*  --   --   --  364*    < > = values in this interval not displayed.     Results from last 7 days   Lab Units 02/01/21 0339 01/31/21 0636 01/30/21 0359 01/26/21  0452   WBC 10*3/mm3 11.38* 12.29* 17.09*   < > 11.76*   ALBUMIN g/dL 2.40* 2.10* 2.10*   < > 2.60*   PREALBUMIN mg/dL  --   --   --   --  5.7*   CRP mg/dL  --   --   --   --  27.02*   CHOLESTEROL mg/dL 108  --   --   --  110   TRIGLYCERIDES mg/dL 411*  --   --   --  364*    < > = values in this interval not displayed.     Results from last 7 days   Lab Units 02/01/21  0509 01/31/21  2318 01/31/21  1717 01/31/21  1244 01/31/21  0514 01/30/21  2311   GLUCOSE mg/dL 128 112 121 138* 100 129     No results found for: HGBA1C  Medications reviewed   Medications reviewed: Yes  Pertinent: dulcolax, valium, folic acid, antibiotic, relistor, oxycodone, protonix, seroquel, senokot, thiamin  GTT:  precedex  PRN: tylenol, ativan, versed, simethicone    Lactulose given (1/25 and 1/26)      Needs Assessment  (2/1)   Height used: 74 in/188 cm  Weight used: 327 lb/148 kg  IBW: 190 lb/86 kg    Estimated Calories needs: ~2000 kcal/day  14 kcal/kg actual weight= 2072 kcal    Estimated Protein needs:   2.0-2.5 g/kg IBW= 172-215 g pro      Current Nutrition Prescription   PO: NPO Diet       EN: Peptamen Intense VHP   Diet, Tube Feeding Tube Feeding Formula: Peptamen Intense VHP (Peptide Based, Very High Protein) at 100 ml/hr (goal volume= 2000 ml/day) and free water at 30 ml/hr  Route: ND  Verified at bedside: Yes -- running at 60 ml/hr at time of visit (2/1)  Provides at goal volume: 2000 kcal, 184 g pro, 8 g fiber, 1680 ml water from EN, 2780 ml water total      Evaluation of Received Nutrient/Fluid Intake-EN:  3 Days:   507 ml, 25%      Nutrition Diagnosis   1/25  Problem Less than optimal enteral nutrition regimen    Etiology Clinical status/lab values/needs assessment    Signs/Symptoms Hypophosphatemia     resolved      1/22  Problem Inadequate energy intake, inadequate protein intake   Etiology Clinical status   Signs/Symptoms NPO/Clear liquids x 72hrs     Status: EN started (1/24)      Intervention   Intervention: Follow treatment progress, Care plan reviewed   -Will continue with current EN order at this time  -Rec continuing to slowly increase EN back to goal rate as pt tolerates  -Noted that pt's Na+ is 147 today, however pt's EN has either been on hold and/or running at low rate for the past 2 days. Will monitor and increase free water if Na+ continues to be elevated and/or increases    -Will continue to follow and adjust nutrition support regimen as medically appropriate      Goal:   General: Nutrition support treatment  PO: N/A   EN/PN: Tolerate EN at goal      Monitoring/Evaluation:   Monitoring/Evaluation: Per protocol, I&O, Pertinent labs, EN delivery/tolerance, Weight, GI status, Symptoms    Lynette RODRIGUEZ  MS Nadeem RD/LD CNSC  Time Spent: 45 minutes

## 2021-02-01 NOTE — PROGRESS NOTES
INTENSIVIST   PROGRESS NOTE     Hospital:  LOS: 13 days     Mr. Titus Bro, 46 y.o. male is followed for a Chief Complaint of: Encephalopathy      Subjective   S     Interval History:  Fever of 101 overnight. Extubated yesterday. Some difficulty with secretions. Intermittently agitated and remains on max-dose Precedex.        The patient's relevant past medical, surgical and social history were reviewed and updated in Epic as appropriate.      ROS: Unable to obtain secondary to mental status.     Objective   O     Vitals:  Temp  Min: 99.1 °F (37.3 °C)  Max: 101.5 °F (38.6 °C)  BP  Min: 107/54  Max: 158/91  Pulse  Min: 76  Max: 113  Resp  Min: 26  Max: 32  SpO2  Min: 90 %  Max: 100 % Flow (L/min)  Min: 3  Max: 6    Intake/Ouptut 24 hrs (7:00AM - 6:59 AM)  Intake & Output (last 3 days)       01/29 0701 - 01/30 0700 01/30 0701 - 01/31 0700 01/31 0701 - 02/01 0700 02/01 0701 - 02/02 0700    I.V. (mL/kg) 2032.8 (13.7) 2126 (14.4) 1372.7 (6.7)     Other 760 501 388     NG/GT 1109 321 626     IV Piggyback 446.6 350 286     Total Intake(mL/kg) 4348.4 (29.4) 3298 (22.3) 2672.7 (13)     Urine (mL/kg/hr) 1625 (0.5) 2005 (0.6) 4425 (0.9) 400 (0.5)    Emesis/NG output 1475 450 720 250    Stool 400  0     Total Output 3500 2455 5145 650    Net +848.4 +843 -2472.3 -650            Stool Unmeasured Occurrence   1 x           Medications (drips):  dexmedetomidine, Last Rate: 1.5 mcg/kg/hr (02/01/21 0558)        Mechanical Ventilator Settings:          Resp Rate (Set): 16  Pressure Support (cm H2O): 0 cm H20  FiO2 (%): 30 %  PEEP/CPAP (cm H2O): 5 cm H20    Minute Ventilation (L/min) (Obs): 9.24 L/min  Resp Rate (Observed) Vent: 23  I:E Ratio (Set): 1:2.75  I:E Ratio (Obs): 1:2.80    PIP Observed (cm H2O): 24 cm H2O  Plateau Pressure (cm H2O): 31 cm H2O    Physical Examination  Telemetry:  Normal sinus rhythm.    Constitutional:  No acute distress.  Resting in bed.    Eyes: No scleral icterus.   PERRL, EOM intact.    Neck:  Supple,  FROM   Cardiovascular: Normal rate, regular and rhythm. Normal heart sounds.  No murmurs, gallop or rub.   Respiratory: No respiratory distress. Normal respiratory effort.  Coarse rhonchi bilaterally.    Abdominal:  Soft. No masses. Nontender. No distension. No HSM.   Extremities: No digital cyanosis. No clubbing.  No peripheral edema.   Skin: No rashes, lesions or ulcers   Neurological:   Alert but disoriented. Not cooperative.               Results from last 7 days   Lab Units 02/01/21 0339 01/31/21 0636 01/30/21  0359   WBC 10*3/mm3 11.38* 12.29* 17.09*   HEMOGLOBIN g/dL 10.7* 11.0* 11.6*   MCV fL 90.7 90.2 88.5   PLATELETS 10*3/mm3 396 336 302     Results from last 7 days   Lab Units 02/01/21 0339 01/31/21 0636 01/30/21 0359 01/26/21  0452   SODIUM mmol/L 147* 141 141   < > 149*   POTASSIUM mmol/L 4.1 4.4 4.6   < > 4.0   CO2 mmol/L 30.0* 28.0 24.0   < > 25.0   CREATININE mg/dL 0.69* 0.65* 0.59*   < > 1.38*   GLUCOSE mg/dL 124* 123* 151*   < > 123*   MAGNESIUM mg/dL 1.7  --   --   --  2.2   PHOSPHORUS mg/dL 3.5 3.3 2.9   < > 3.1    < > = values in this interval not displayed.     Estimated Creatinine Clearance: 249.8 mL/min (A) (by C-G formula based on SCr of 0.69 mg/dL (L)).  Results from last 7 days   Lab Units 02/01/21 0339 01/26/21  0452   ALK PHOS U/L 83 102   BILIRUBIN mg/dL 0.3 0.3   ALT (SGPT) U/L 22 25   AST (SGOT) U/L 60* 69*       Results from last 7 days   Lab Units 02/01/21  0140   PH, ARTERIAL pH units 7.422   PCO2, ARTERIAL mm Hg 49.4*   PO2 ART mm Hg 79.9*   FIO2 % 33       Images:    Imaging Results (Last 24 Hours)     Procedure Component Value Units Date/Time    XR Chest 1 View [[id number]] Collected: 02/01/21 0829     Updated: 02/01/21 0839    Narrative:      EXAMINATION: XR CHEST 1 VW-02/01/2021:      INDICATION: F/U respiratory illness; K85.90-Acute pancreatitis without  necrosis or infection, unspecified.      COMPARISON: NONE.     FINDINGS: Portable chest reveals the heart to be  enlarged. The  endotracheal tube has been removed. The remainder of the lines and tubes  are in satisfactory position. Increased markings seen diffusely  throughout the lung fields. Degenerative changes seen within the spine.  No pleural effusion or pneumothorax. Increased pulmonary vascularity  bilaterally.       Impression:      Removal of the endotracheal tube. There is slight  improvement seen of the aeration of the lung fields in the interval.  Lung volumes remain low.     D:  02/01/2021  E:  02/01/2021                   Results: Reviewed.  I reviewed the patient's new laboratory and imaging results.  I independently reviewed the patient's new images.    Medications: Reviewed.    Assessment/Plan   A / P     Mr. Bro is a 45yo M with a history of ETOH abuse who was admitted on 1/19/21 with pancreatitis. He was admitted to Hospital Medicine but developed ETOH withdrawal necessitating transfer to the ICU on 1/22/21. He was placed on a Precedex drip and given IV Valium. He continued to require intermittent doses of Ativan despite this. He ultimately required intubation with mechanical ventilation on 1/24/21. A repeat CT of the abdomen/pelvis was performed on 1/27/21 due to worsening fevers and showed some possible necrosis in the head of the pancreas and worsening peripancreatic fluid collections. GI is following. He was extubated on 1/31/21. Respiratory status remains marginal this AM.     Nutrition:   NPO Diet  Advance Directives:   Code Status and Medical Interventions:   Ordered at: 01/19/21 1408     Level Of Support Discussed With:    Patient     Code Status:    CPR     Medical Interventions (Level of Support Prior to Arrest):    Full       Active Hospital Problems    Diagnosis   • **Acute pancreatitis. Alcoholic +/- hyperlipidemia induced   • Alcohol withdrawal    • Hypertriglyceridemia   • Fatty liver   • Acute hypoxemic respiratory failure requiring NIV. ICU transfer 1/22/2021 (CMS/[facility])   • Fever and  increased procalcitonin. ? source   • Class 3 severe obesity in adult    • LIANA   • Alcohol abuse   • Essential hypertension   • GERD without esophagitis       Assessment / Plan:    1. Continue to monitor in the ICU. High risk for reintubation.  2. NT suctioning as needed.   3. Change Valium to scheduled  4. Advance tube feeds as tolerated.   5. Continue Meropenem and Micafungin.   6. Continue scheduled Seroquel.  7. Continue thiamine  8. AM labs    High level of risk due to:  severe exacerbation of chronic illness and illness with threat to life or bodily function.    Plan of care and goals reviewed during interdisciplinary rounds.  I discussed the patient's findings and my recommendations with patient and nursing staff      Jeanie Page, DO    Intensive Care Medicine and Pulmonary Medicine

## 2021-02-02 ENCOUNTER — APPOINTMENT (OUTPATIENT)
Dept: GENERAL RADIOLOGY | Facility: HOSPITAL | Age: 46
End: 2021-02-02

## 2021-02-02 LAB
ANION GAP SERPL CALCULATED.3IONS-SCNC: 11 MMOL/L (ref 5–15)
BASOPHILS # BLD AUTO: 0.05 10*3/MM3 (ref 0–0.2)
BASOPHILS NFR BLD AUTO: 0.4 % (ref 0–1.5)
BUN SERPL-MCNC: 13 MG/DL (ref 6–20)
BUN/CREAT SERPL: 21.7 (ref 7–25)
CALCIUM SPEC-SCNC: 8.9 MG/DL (ref 8.6–10.5)
CHLORIDE SERPL-SCNC: 105 MMOL/L (ref 98–107)
CO2 SERPL-SCNC: 30 MMOL/L (ref 22–29)
CREAT SERPL-MCNC: 0.6 MG/DL (ref 0.76–1.27)
DEPRECATED RDW RBC AUTO: 47.7 FL (ref 37–54)
EOSINOPHIL # BLD AUTO: 0.09 10*3/MM3 (ref 0–0.4)
EOSINOPHIL NFR BLD AUTO: 0.7 % (ref 0.3–6.2)
ERYTHROCYTE [DISTWIDTH] IN BLOOD BY AUTOMATED COUNT: 14.9 % (ref 12.3–15.4)
GFR SERPL CREATININE-BSD FRML MDRD: >150 ML/MIN/1.73
GLUCOSE BLDC GLUCOMTR-MCNC: 107 MG/DL (ref 70–130)
GLUCOSE BLDC GLUCOMTR-MCNC: 111 MG/DL (ref 70–130)
GLUCOSE BLDC GLUCOMTR-MCNC: 115 MG/DL (ref 70–130)
GLUCOSE BLDC GLUCOMTR-MCNC: 125 MG/DL (ref 70–130)
GLUCOSE SERPL-MCNC: 136 MG/DL (ref 65–99)
HCT VFR BLD AUTO: 34.3 % (ref 37.5–51)
HGB BLD-MCNC: 10.3 G/DL (ref 13–17.7)
IMM GRANULOCYTES # BLD AUTO: 0.08 10*3/MM3 (ref 0–0.05)
IMM GRANULOCYTES NFR BLD AUTO: 0.7 % (ref 0–0.5)
LYMPHOCYTES # BLD AUTO: 1.34 10*3/MM3 (ref 0.7–3.1)
LYMPHOCYTES NFR BLD AUTO: 11 % (ref 19.6–45.3)
MAGNESIUM SERPL-MCNC: 1.7 MG/DL (ref 1.6–2.6)
MCH RBC QN AUTO: 26.3 PG (ref 26.6–33)
MCHC RBC AUTO-ENTMCNC: 30 G/DL (ref 31.5–35.7)
MCV RBC AUTO: 87.7 FL (ref 79–97)
MONOCYTES # BLD AUTO: 0.64 10*3/MM3 (ref 0.1–0.9)
MONOCYTES NFR BLD AUTO: 5.3 % (ref 5–12)
NEUTROPHILS NFR BLD AUTO: 81.9 % (ref 42.7–76)
NEUTROPHILS NFR BLD AUTO: 9.98 10*3/MM3 (ref 1.7–7)
NRBC BLD AUTO-RTO: 0 /100 WBC (ref 0–0.2)
PHOSPHATE SERPL-MCNC: 4.2 MG/DL (ref 2.5–4.5)
PLATELET # BLD AUTO: 502 10*3/MM3 (ref 140–450)
PMV BLD AUTO: 11.5 FL (ref 6–12)
POTASSIUM SERPL-SCNC: 4 MMOL/L (ref 3.5–5.2)
RBC # BLD AUTO: 3.91 10*6/MM3 (ref 4.14–5.8)
SODIUM SERPL-SCNC: 146 MMOL/L (ref 136–145)
WBC # BLD AUTO: 12.18 10*3/MM3 (ref 3.4–10.8)

## 2021-02-02 PROCEDURE — 94660 CPAP INITIATION&MGMT: CPT

## 2021-02-02 PROCEDURE — 25010000002 METHYLNALTREXONE 12 MG/0.6ML SOLUTION: Performed by: INTERNAL MEDICINE

## 2021-02-02 PROCEDURE — 71045 X-RAY EXAM CHEST 1 VIEW: CPT

## 2021-02-02 PROCEDURE — 25010000002 LORAZEPAM PER 2 MG: Performed by: INTERNAL MEDICINE

## 2021-02-02 PROCEDURE — 99233 SBSQ HOSP IP/OBS HIGH 50: CPT | Performed by: INTERNAL MEDICINE

## 2021-02-02 PROCEDURE — 85025 COMPLETE CBC W/AUTO DIFF WBC: CPT | Performed by: INTERNAL MEDICINE

## 2021-02-02 PROCEDURE — 25010000002 MEROPENEM PER 100 MG: Performed by: INTERNAL MEDICINE

## 2021-02-02 PROCEDURE — 25010000003 MAGNESIUM SULFATE 4 GM/100ML SOLUTION: Performed by: NURSE PRACTITIONER

## 2021-02-02 PROCEDURE — 94799 UNLISTED PULMONARY SVC/PX: CPT

## 2021-02-02 PROCEDURE — 84100 ASSAY OF PHOSPHORUS: CPT | Performed by: INTERNAL MEDICINE

## 2021-02-02 PROCEDURE — 82962 GLUCOSE BLOOD TEST: CPT

## 2021-02-02 PROCEDURE — 25010000002 DIAZEPAM PER 5 MG: Performed by: INTERNAL MEDICINE

## 2021-02-02 PROCEDURE — 80048 BASIC METABOLIC PNL TOTAL CA: CPT | Performed by: INTERNAL MEDICINE

## 2021-02-02 PROCEDURE — 25010000002 MICAFUNGIN SODIUM 100 MG RECONSTITUTED SOLUTION: Performed by: INTERNAL MEDICINE

## 2021-02-02 PROCEDURE — 83735 ASSAY OF MAGNESIUM: CPT | Performed by: INTERNAL MEDICINE

## 2021-02-02 PROCEDURE — 25010000002 ENOXAPARIN PER 10 MG: Performed by: FAMILY MEDICINE

## 2021-02-02 RX ORDER — ZIPRASIDONE MESYLATE 20 MG/ML
20 INJECTION, POWDER, LYOPHILIZED, FOR SOLUTION INTRAMUSCULAR ONCE
Status: COMPLETED | OUTPATIENT
Start: 2021-02-03 | End: 2021-02-03

## 2021-02-02 RX ORDER — QUETIAPINE FUMARATE 25 MG/1
50 TABLET, FILM COATED ORAL ONCE
Status: COMPLETED | OUTPATIENT
Start: 2021-02-03 | End: 2021-02-02

## 2021-02-02 RX ORDER — DIAZEPAM 5 MG/ML
5 INJECTION, SOLUTION INTRAMUSCULAR; INTRAVENOUS ONCE
Status: COMPLETED | OUTPATIENT
Start: 2021-02-03 | End: 2021-02-02

## 2021-02-02 RX ORDER — QUETIAPINE FUMARATE 100 MG/1
100 TABLET, FILM COATED ORAL EVERY 8 HOURS SCHEDULED
Status: DISCONTINUED | OUTPATIENT
Start: 2021-02-03 | End: 2021-02-08

## 2021-02-02 RX ADMIN — MEROPENEM 2 G: 1 INJECTION, POWDER, FOR SOLUTION INTRAVENOUS at 23:22

## 2021-02-02 RX ADMIN — DIAZEPAM 5 MG: 5 INJECTION, SOLUTION INTRAMUSCULAR; INTRAVENOUS at 23:33

## 2021-02-02 RX ADMIN — METOPROLOL TARTRATE 25 MG: 25 TABLET, FILM COATED ORAL at 21:11

## 2021-02-02 RX ADMIN — QUETIAPINE 50 MG: 25 TABLET, FILM COATED ORAL at 21:11

## 2021-02-02 RX ADMIN — DIAZEPAM 5 MG: 5 INJECTION, SOLUTION INTRAMUSCULAR; INTRAVENOUS at 19:54

## 2021-02-02 RX ADMIN — ACETAMINOPHEN ORAL SOLUTION 650 MG: 650 SOLUTION ORAL at 11:42

## 2021-02-02 RX ADMIN — CLONIDINE HYDROCHLORIDE 0.1 MG: 0.1 TABLET ORAL at 21:11

## 2021-02-02 RX ADMIN — ENOXAPARIN SODIUM 40 MG: 40 INJECTION SUBCUTANEOUS at 09:08

## 2021-02-02 RX ADMIN — LORAZEPAM 2 MG: 2 INJECTION INTRAMUSCULAR; INTRAVENOUS at 11:42

## 2021-02-02 RX ADMIN — IPRATROPIUM BROMIDE AND ALBUTEROL SULFATE 3 ML: 2.5; .5 SOLUTION RESPIRATORY (INHALATION) at 20:26

## 2021-02-02 RX ADMIN — QUETIAPINE 50 MG: 25 TABLET, FILM COATED ORAL at 13:46

## 2021-02-02 RX ADMIN — IPRATROPIUM BROMIDE AND ALBUTEROL SULFATE 3 ML: 2.5; .5 SOLUTION RESPIRATORY (INHALATION) at 16:00

## 2021-02-02 RX ADMIN — MEROPENEM 2 G: 1 INJECTION, POWDER, FOR SOLUTION INTRAVENOUS at 10:44

## 2021-02-02 RX ADMIN — LORAZEPAM 2 MG: 2 INJECTION INTRAMUSCULAR; INTRAVENOUS at 22:42

## 2021-02-02 RX ADMIN — MAGNESIUM SULFATE 4 G: 4 INJECTION INTRAVENOUS at 10:57

## 2021-02-02 RX ADMIN — SODIUM CHLORIDE, PRESERVATIVE FREE 10 ML: 5 INJECTION INTRAVENOUS at 09:10

## 2021-02-02 RX ADMIN — OXYCODONE HYDROCHLORIDE 15 MG: 15 TABLET ORAL at 00:48

## 2021-02-02 RX ADMIN — ACETAMINOPHEN ORAL SOLUTION 649.6 MG: 650 SOLUTION ORAL at 05:36

## 2021-02-02 RX ADMIN — IPRATROPIUM BROMIDE AND ALBUTEROL SULFATE 3 ML: 2.5; .5 SOLUTION RESPIRATORY (INHALATION) at 07:52

## 2021-02-02 RX ADMIN — MEROPENEM 2 G: 1 INJECTION, POWDER, FOR SOLUTION INTRAVENOUS at 00:49

## 2021-02-02 RX ADMIN — IPRATROPIUM BROMIDE AND ALBUTEROL SULFATE 3 ML: 2.5; .5 SOLUTION RESPIRATORY (INHALATION) at 12:05

## 2021-02-02 RX ADMIN — DEXMEDETOMIDINE 1.5 MCG/KG/HR: 100 INJECTION, SOLUTION, CONCENTRATE INTRAVENOUS at 23:22

## 2021-02-02 RX ADMIN — METOPROLOL TARTRATE 25 MG: 25 TABLET, FILM COATED ORAL at 09:09

## 2021-02-02 RX ADMIN — OXYCODONE HYDROCHLORIDE 15 MG: 15 TABLET ORAL at 05:29

## 2021-02-02 RX ADMIN — DIAZEPAM 5 MG: 5 INJECTION, SOLUTION INTRAMUSCULAR; INTRAVENOUS at 23:46

## 2021-02-02 RX ADMIN — PANTOPRAZOLE SODIUM 40 MG: 40 INJECTION, POWDER, LYOPHILIZED, FOR SOLUTION INTRAVENOUS at 05:28

## 2021-02-02 RX ADMIN — DEXMEDETOMIDINE 1 MCG/KG/HR: 100 INJECTION, SOLUTION, CONCENTRATE INTRAVENOUS at 10:44

## 2021-02-02 RX ADMIN — BISACODYL 10 MG: 10 SUPPOSITORY RECTAL at 09:10

## 2021-02-02 RX ADMIN — DIAZEPAM 5 MG: 5 INJECTION, SOLUTION INTRAMUSCULAR; INTRAVENOUS at 02:15

## 2021-02-02 RX ADMIN — QUETIAPINE FUMARATE 50 MG: 25 TABLET ORAL at 23:45

## 2021-02-02 RX ADMIN — METHYLNALTREXONE BROMIDE 12 MG: 12 INJECTION, SOLUTION SUBCUTANEOUS at 09:08

## 2021-02-02 RX ADMIN — SENNOSIDES 10 ML: 8.8 LIQUID ORAL at 21:11

## 2021-02-02 RX ADMIN — SODIUM CHLORIDE, PRESERVATIVE FREE 10 ML: 5 INJECTION INTRAVENOUS at 21:11

## 2021-02-02 RX ADMIN — OXYCODONE HYDROCHLORIDE 15 MG: 15 TABLET ORAL at 17:23

## 2021-02-02 RX ADMIN — QUETIAPINE 50 MG: 25 TABLET, FILM COATED ORAL at 05:28

## 2021-02-02 RX ADMIN — CLONIDINE HYDROCHLORIDE 0.1 MG: 0.1 TABLET ORAL at 09:10

## 2021-02-02 RX ADMIN — IPRATROPIUM BROMIDE AND ALBUTEROL SULFATE 3 ML: 2.5; .5 SOLUTION RESPIRATORY (INHALATION) at 00:13

## 2021-02-02 RX ADMIN — THIAMINE HCL TAB 100 MG 100 MG: 100 TAB at 09:09

## 2021-02-02 RX ADMIN — LORAZEPAM 2 MG: 2 INJECTION INTRAMUSCULAR; INTRAVENOUS at 17:23

## 2021-02-02 RX ADMIN — MEROPENEM 2 G: 1 INJECTION, POWDER, FOR SOLUTION INTRAVENOUS at 15:55

## 2021-02-02 RX ADMIN — OXYCODONE HYDROCHLORIDE 15 MG: 15 TABLET ORAL at 23:22

## 2021-02-02 RX ADMIN — DEXMEDETOMIDINE 1.5 MCG/KG/HR: 100 INJECTION, SOLUTION, CONCENTRATE INTRAVENOUS at 00:49

## 2021-02-02 RX ADMIN — OXYCODONE HYDROCHLORIDE 15 MG: 15 TABLET ORAL at 11:06

## 2021-02-02 RX ADMIN — DEXMEDETOMIDINE 1 MCG/KG/HR: 100 INJECTION, SOLUTION, CONCENTRATE INTRAVENOUS at 17:22

## 2021-02-02 RX ADMIN — DIAZEPAM 5 MG: 5 INJECTION, SOLUTION INTRAMUSCULAR; INTRAVENOUS at 09:08

## 2021-02-02 RX ADMIN — DIAZEPAM 5 MG: 5 INJECTION, SOLUTION INTRAMUSCULAR; INTRAVENOUS at 13:46

## 2021-02-02 RX ADMIN — MICAFUNGIN SODIUM 100 MG: 100 INJECTION, POWDER, LYOPHILIZED, FOR SOLUTION INTRAVENOUS at 09:09

## 2021-02-02 RX ADMIN — ENOXAPARIN SODIUM 40 MG: 40 INJECTION SUBCUTANEOUS at 21:11

## 2021-02-02 RX ADMIN — FOLIC ACID 1 MG: 1 TABLET ORAL at 09:09

## 2021-02-02 RX ADMIN — ACETAMINOPHEN ORAL SOLUTION 649.6 MG: 650 SOLUTION ORAL at 23:46

## 2021-02-02 RX ADMIN — ESCITALOPRAM OXALATE 20 MG: 20 TABLET ORAL at 09:09

## 2021-02-02 NOTE — PROGRESS NOTES
Clinical Nutrition   Reason For Visit: MDR, Follow-up protocol, EN    Patient Name: Titus Bro  YOB: 1975  MRN: [id number]  Date of Encounter: 02/02/21 10:30 EST  Admission date: 1/19/2021      Nutrition Assessment     Admission Problem List:  Acute pancreatitis. Alcoholic +/- hyperlipidemia induced  Alcohol abuse  LIANA, improved  Alcohol withdrawal   Hypertriglyceridemia  Fatty liver  Acute hypoxemic respiratory failure requiring NIV. ICU transfer 1/22/2021  Vent (1/24) --> extubated (1/31)  Fevers  Ileus, resolved  Hypophosphatemia, resolved  Per CT abdomen/pelvis (1/27)- pancreatic head- acute necrotic pancreatitis        Applicable PMH:  HTN  GERD  COPD  Seizure 2/2 EtOH withdrawal      Applicable Nutrition-Related Information:  (1/24) EN ordered per intensivist- Peptamen Intense VHP at 75 ml/hr and free water at 55 ml/hr via NGT  (1/25) EN rate decreased per RD 2/2 hypophosphatemia- VHP at 20 ml/hr  (1/26) EN rate increased per RD- 100 ml/hr  (1/27) Pt had residual of 500 ml this AM, EN held. NDT placed and EN restarted via NDT  (1/28) Pt tolerating EN via NDT  (1/30) free water decreased to 30 ml/hr per intensivist  (2/1) Per previous RN notes, pt had 1150 ml out NGT on (1/29), EN was then held ~1800. EN restarted at trophic rate on (1/30). EN held on (1/31) and then restarted later that day at 25 ml/hr. EN currently at 60 ml/hr and pt tolerating, advancing slowly to goal rate as pt tolerates.        Reported/Observed/Food/Nutrition Related History     Pt tolerating EN.     Per I&O over the past 24 hrs:  NGT output= 520 ml  Last bowel movement documented (1/31-2/1)      Anthropometrics   Height: 74 in  Weight: 327 lb (1/22), no method documented  BMI: 42.0  BMI classification: Obese Class III extreme obesity: > or equal to 40kg/m2   IBW: 190 lb    Date Weight (kg) Weight (lbs) Weight Method   2/1/2021 206.341 kg  454 lb 14.4 oz  Bed scale   1/22/2021 148.326 kg 327 lb -   1/22/2021 148.4 kg 327  lb 2.6 oz -   1/19/2021 151.955 kg 335 lb -   1/19/2021 151.955 kg 335 lb Stated   6/10/2020 151.955 kg 335 lb -   8/7/2019 144.244 kg 318 lb Stated   7/17/2017 129.275 kg 285 lb Stated       Labs reviewed   Labs reviewed: Yes    Results from last 7 days   Lab Units 02/02/21  0751 02/01/21  0339 01/31/21  0636   SODIUM mmol/L 146* 147* 141   POTASSIUM mmol/L 4.0 4.1 4.4   CHLORIDE mmol/L 105 106 105   CO2 mmol/L 30.0* 30.0* 28.0   BUN mg/dL 13 16 20   CREATININE mg/dL 0.60* 0.69* 0.65*   GLUCOSE mg/dL 136* 124* 123*   CALCIUM mg/dL 8.9 9.1 9.1   PHOSPHORUS mg/dL 4.2 3.5 3.3   MAGNESIUM mg/dL 1.7 1.7  --    CHOLESTEROL mg/dL  --  108  --    TRIGLYCERIDES mg/dL  --  411*  --      Results from last 7 days   Lab Units 02/02/21  0751 02/01/21  0339 01/31/21  0636 01/30/21  0359   WBC 10*3/mm3 12.18* 11.38* 12.29* 17.09*   ALBUMIN g/dL  --  2.40* 2.10* 2.10*   CHOLESTEROL mg/dL  --  108  --   --    TRIGLYCERIDES mg/dL  --  411*  --   --      Results from last 7 days   Lab Units 02/02/21  0520 02/01/21  2324 02/01/21  1810 02/01/21  1145 02/01/21  0509 01/31/21  2318   GLUCOSE mg/dL 125 124 119 125 128 112     No results found for: HGBA1C  Medications reviewed   Medications reviewed: Yes  Pertinent: dulcolax, valium, folic acid, antibiotic, relistor, oxycodone, protonix, seroquel, senokot, thiamin  GTT: precedex  PRN: tylenol, ativan, simethicone    Lactulose given (1/25 and 1/26)      Needs Assessment  (2/1)   Height used: 74 in/188 cm  Weight used: 327 lb/148 kg  IBW: 190 lb/86 kg    Estimated Calories needs: ~2000 kcal/day  14 kcal/kg actual weight= 2072 kcal    Estimated Protein needs:   2.0-2.5 g/kg IBW= 172-215 g pro      Current Nutrition Prescription   PO: NPO Diet       EN: Peptamen Intense VHP   Diet, Tube Feeding Tube Feeding Formula: Peptamen Intense VHP (Peptide Based, Very High Protein) at 100 ml/hr (goal volume= 2000 ml/day) and free water at 30 ml/hr  Route: ND  Verified at bedside: Yes -- EN pump on hold  at time of RD visit 92/2)  Provides at goal volume: 2000 kcal, 184 g pro, 8 g fiber, 1680 ml water from EN, 2780 ml water total      Evaluation of Received Nutrient/Fluid Intake-EN:  1 Day:   1090 ml, 55%      Nutrition Diagnosis   1/25  Problem Less than optimal enteral nutrition regimen    Etiology Clinical status/lab values/needs assessment    Signs/Symptoms Hypophosphatemia     resolved      1/22  Problem Inadequate energy intake, inadequate protein intake   Etiology Clinical status   Signs/Symptoms NPO/Clear liquids x 72hrs     Status: EN started (1/24)      Intervention   Intervention: Follow treatment progress, Care plan reviewed   -Will continue with current EN order at this time  -Will continue to monitor Na+ and increase free water if Na+ continues to be elevated and/or increases    -Will continue to follow and adjust nutrition support regimen as medically appropriate      Goal:   General: Nutrition support treatment  PO: N/A   EN/PN: Maintain EN, Tolerate EN at goal      Monitoring/Evaluation:   Monitoring/Evaluation: Per protocol, I&O, Pertinent labs, EN delivery/tolerance, Weight, GI status, Symptoms    Lynette Silver, MS RD/LD CNSC  Time Spent: 45 minutes

## 2021-02-02 NOTE — PLAN OF CARE
Goal Outcome Evaluation:  Plan of Care Reviewed With: patient  Progress: improving  Outcome Summary: Patient remains oriented to self only. On Precedex gtt, able to wean to 1mcg/kg/hr. PRN Ativan given x1. BiPAP tolerated for 7 hours. NT suction x1. Max temperature 100.6. Will continue to monitor.

## 2021-02-02 NOTE — PROGRESS NOTES
INTENSIVIST   PROGRESS NOTE     Hospital:  LOS: 14 days     Mr. Titus Bro, 46 y.o. male is followed for a Chief Complaint of: Encephalopathy      Subjective   S     Interval History:  No acute events. Remains confused on Precedex.        The patient's relevant past medical, surgical and social history were reviewed and updated in Epic as appropriate.      ROS: Unable to obtain secondary to mental status.     Objective   O     Vitals:  Temp  Min: 98.2 °F (36.8 °C)  Max: 102 °F (38.9 °C)  BP  Min: 115/75  Max: 171/98  Pulse  Min: 71  Max: 110  Resp  Min: 26  Max: 32  SpO2  Min: 90 %  Max: 100 % Flow (L/min)  Min: 2  Max: 4    Intake/Ouptut 24 hrs (7:00AM - 6:59 AM)  Intake & Output (last 3 days)       01/30 0701 - 01/31 0700 01/31 0701 - 02/01 0700 02/01 0701 - 02/02 0700 02/02 0701 - 02/03 0700    I.V. (mL/kg) 2126 (14.4) 1372.7 (6.7) 1707.6 (8.3) 315.1 (1.5)    Other 501 388 541 270    NG/ 273 0666 334    IV Piggyback 350 286 200 96.5    Total Intake(mL/kg) 3298 (22.3) 2672.7 (13) 3698.6 (18) 1015.6 (4.9)    Urine (mL/kg/hr) 2005 (0.6) 4425 (0.9) 2045 (0.4) 400 (0.5)    Emesis/NG output 450 720 520     Stool  0      Total Output 2455 5145 2565 400    Net +843 -2472.3 +1133.6 +615.6            Stool Unmeasured Occurrence  1 x            Medications (drips):  dexmedetomidine, Last Rate: 1 mcg/kg/hr (02/02/21 1044)          Physical Examination  Telemetry:  Normal sinus rhythm.    Constitutional:  No acute distress.  Resting in bed.    Eyes: No scleral icterus.   PERRL, EOM intact.    Neck:  Supple, FROM   Cardiovascular: Normal rate, regular and rhythm. Normal heart sounds.  No murmurs, gallop or rub.   Respiratory: No respiratory distress. Normal respiratory effort.  Coarse rhonchi bilaterally.    Abdominal:  Soft. No masses. Nontender. No distension. No HSM.   Extremities: No digital cyanosis. No clubbing.  No peripheral edema.   Skin: No rashes, lesions or ulcers   Neurological:   Alert but disoriented.  Mumbling incoherently.               Results from last 7 days   Lab Units 02/02/21  0751 02/01/21  0339 01/31/21  0636   WBC 10*3/mm3 12.18* 11.38* 12.29*   HEMOGLOBIN g/dL 10.3* 10.7* 11.0*   MCV fL 87.7 90.7 90.2   PLATELETS 10*3/mm3 502* 396 336     Results from last 7 days   Lab Units 02/02/21  0751 02/01/21  0339 01/31/21  0636   SODIUM mmol/L 146* 147* 141   POTASSIUM mmol/L 4.0 4.1 4.4   CO2 mmol/L 30.0* 30.0* 28.0   CREATININE mg/dL 0.60* 0.69* 0.65*   GLUCOSE mg/dL 136* 124* 123*   MAGNESIUM mg/dL 1.7 1.7  --    PHOSPHORUS mg/dL 4.2 3.5 3.3     Estimated Creatinine Clearance: 287.2 mL/min (A) (by C-G formula based on SCr of 0.6 mg/dL (L)).  Results from last 7 days   Lab Units 02/01/21  0339   ALK PHOS U/L 83   BILIRUBIN mg/dL 0.3   ALT (SGPT) U/L 22   AST (SGOT) U/L 60*       Results from last 7 days   Lab Units 02/01/21  0140   PH, ARTERIAL pH units 7.422   PCO2, ARTERIAL mm Hg 49.4*   PO2 ART mm Hg 79.9*   FIO2 % 33       Images:    Imaging Results (Last 24 Hours)     Procedure Component Value Units Date/Time    XR Chest 1 View [[id number]] Collected: 02/02/21 0844     Updated: 02/02/21 1056    Narrative:      EXAMINATION: XR CHEST 1 VW- 02/02/2021     INDICATION: f/u respiratory illness; K85.90-Acute pancreatitis without  necrosis or infection, unspecified      COMPARISON: Chest x-ray 02/01/2021     FINDINGS: Support hardware unchanged and in satisfactory positioning.  Cardiac size enlarged. Low lung volumes with hypoventilatory effects and  patchy opacifications in the midlungs stable to slightly increased from  prior. No pneumothorax or pleural effusion.          Impression:      Low lung volumes with hypoventilatory effects and patchy  opacifications in the midlungs stable to slightly increased from prior.  No pneumothorax or pleural effusion.     D:  02/02/2021  E:  02/02/2021     This report was finalized on 2/2/2021 10:53 AM by Dr. Apollo Ceron.       XR Chest 1 View [[id number]] Collected: 02/01/21  0829     Updated: 02/01/21 1641    Narrative:      EXAMINATION: XR CHEST 1 VW-02/01/2021:      INDICATION: F/U respiratory illness; K85.90-Acute pancreatitis without  necrosis or infection, unspecified.      COMPARISON: NONE.     FINDINGS: Portable chest reveals the heart to be enlarged. The  endotracheal tube has been removed. The remainder of the lines and tubes  are in satisfactory position. Increased markings seen diffusely  throughout the lung fields. Degenerative changes seen within the spine.  No pleural effusion or pneumothorax. Increased pulmonary vascularity  bilaterally.       Impression:      Removal of the endotracheal tube. There is slight  improvement seen of the aeration of the lung fields in the interval.  Lung volumes remain low.     D:  02/01/2021  E:  02/01/2021     This report was finalized on 2/1/2021 4:38 PM by Dr. Jackelin Pacheco MD.             Results: Reviewed.  I reviewed the patient's new laboratory and imaging results.  I independently reviewed the patient's new images.    Medications: Reviewed.    Assessment/Plan   A / P     Mr. Bro is a 45yo M with a history of ETOH abuse who was admitted on 1/19/21 with pancreatitis. He was admitted to Hospital Medicine but developed ETOH withdrawal necessitating transfer to the ICU on 1/22/21. He was placed on a Precedex drip and given IV Valium. He continued to require intermittent doses of Ativan despite this. He ultimately required intubation with mechanical ventilation on 1/24/21. A repeat CT of the abdomen/pelvis was performed on 1/27/21 due to worsening fevers and showed some possible necrosis in the head of the pancreas and worsening peripancreatic fluid collections. GI is following. He was extubated on 1/31/21. Respiratory status remains marginal this AM.     Nutrition:   NPO Diet  Advance Directives:   Code Status and Medical Interventions:   Ordered at: 01/19/21 1404     Level Of Support Discussed With:    Patient     Code Status:     CPR     Medical Interventions (Level of Support Prior to Arrest):    Full       Active Hospital Problems    Diagnosis   • **Acute pancreatitis. Alcoholic +/- hyperlipidemia induced   • Alcohol withdrawal    • Hypertriglyceridemia   • Fatty liver   • Acute hypoxemic respiratory failure requiring NIV. ICU transfer 1/22/2021 (CMS/[facility])   • Fever and increased procalcitonin. ? source   • Class 3 severe obesity in adult    • LIANA   • Alcohol abuse   • Essential hypertension   • GERD without esophagitis       Assessment / Plan:    Continue to monitor in the ICU. High risk for reintubation.  NT suctioning as needed.   Continue scheduled valium. Wean Precedex.   Continue tube feeds.    Continue Meropenem and Micafungin.   Continue scheduled Seroquel.  Continue thiamine  AM labs    High risk for clinical decline.     High level of risk due to:  severe exacerbation of chronic illness and illness with threat to life or bodily function.    Plan of care and goals reviewed during interdisciplinary rounds.  I discussed the patient's findings and my recommendations with patient and nursing staff      Jeanie Page, DO    Intensive Care Medicine and Pulmonary Medicine

## 2021-02-02 NOTE — PLAN OF CARE
Problem: Adult Inpatient Plan of Care  Goal: Plan of Care Review  2/2/2021 0451 by Valeria Jackson RN  Outcome: Ongoing, Progressing  Flowsheets (Taken 2/2/2021 0451)  Outcome Summary: Pt remains on Precedex at 1.5 and still agitated at times. PRN Ativan given. Confused, but oriented to self, able to understand speech at times. 3L NC. Pt tolerated Bipap for a few hours this evening. Pt NT suction multiple times with mod-large, thick, tan/red-streaked secretions. Pt is unable to cough up these secretions or expell them, even with direction and encouragement. Tmax 38.9, ice packs applied and fever has reduced. Pt able to follow commands at times with repetitive commands. BUE restraints in place. UOP adequate. Will continue to monitor.   Goal Outcome Evaluation:        Outcome Summary: Pt remains on Precedex at 1.5 and still agitated at times. PRN Ativan given. Confused, but oriented to self, able to understand speech at times. 3L NC. Pt tolerated Bipap for a few hours this evening. Pt NT suction multiple times with mod-large, thick, tan/red-streaked secretions. Pt is unable to cough up these secretions or expell them, even with direction and encouragement. Tmax 38.9, ice packs applied and fever has reduced. Pt able to follow commands at times with repetitive commands. BUE restraints in place. UOP adequate. Will continue to monitor.

## 2021-02-02 NOTE — PLAN OF CARE
Goal Outcome Evaluation:  Plan of Care Reviewed With: family      Patient continues to be confused with garbled speech and illogical thoughts. VSS, ativan given PRN for patient agitation along with scheduled medications. Patient's RR 26-32; shallow breaths. NT suction x3 this shift with tenacious; tan/red-streaked sections. Patient RR rate improves with pulmonary toileting and frequent encouragement to cough. Precedex gtt continues and BUE remain. Will continue to monitor patient.

## 2021-02-03 ENCOUNTER — APPOINTMENT (OUTPATIENT)
Dept: GENERAL RADIOLOGY | Facility: HOSPITAL | Age: 46
End: 2021-02-03

## 2021-02-03 LAB
ALBUMIN SERPL-MCNC: 2.5 G/DL (ref 3.5–5.2)
ALBUMIN/GLOB SERPL: 0.6 G/DL
ALP SERPL-CCNC: 73 U/L (ref 39–117)
ALT SERPL W P-5'-P-CCNC: 21 U/L (ref 1–41)
ANION GAP SERPL CALCULATED.3IONS-SCNC: 8 MMOL/L (ref 5–15)
AST SERPL-CCNC: 38 U/L (ref 1–40)
BASOPHILS # BLD AUTO: 0.04 10*3/MM3 (ref 0–0.2)
BASOPHILS NFR BLD AUTO: 0.4 % (ref 0–1.5)
BILIRUB SERPL-MCNC: 0.3 MG/DL (ref 0–1.2)
BUN SERPL-MCNC: 14 MG/DL (ref 6–20)
BUN/CREAT SERPL: 22.6 (ref 7–25)
CALCIUM SPEC-SCNC: 9.1 MG/DL (ref 8.6–10.5)
CHLORIDE SERPL-SCNC: 106 MMOL/L (ref 98–107)
CO2 SERPL-SCNC: 32 MMOL/L (ref 22–29)
CREAT SERPL-MCNC: 0.62 MG/DL (ref 0.76–1.27)
DEPRECATED RDW RBC AUTO: 47.2 FL (ref 37–54)
EOSINOPHIL # BLD AUTO: 0.08 10*3/MM3 (ref 0–0.4)
EOSINOPHIL NFR BLD AUTO: 0.8 % (ref 0.3–6.2)
ERYTHROCYTE [DISTWIDTH] IN BLOOD BY AUTOMATED COUNT: 14.6 % (ref 12.3–15.4)
GFR SERPL CREATININE-BSD FRML MDRD: >150 ML/MIN/1.73
GLOBULIN UR ELPH-MCNC: 4 GM/DL
GLUCOSE BLDC GLUCOMTR-MCNC: 100 MG/DL (ref 70–130)
GLUCOSE BLDC GLUCOMTR-MCNC: 114 MG/DL (ref 70–130)
GLUCOSE BLDC GLUCOMTR-MCNC: 114 MG/DL (ref 70–130)
GLUCOSE BLDC GLUCOMTR-MCNC: 123 MG/DL (ref 70–130)
GLUCOSE SERPL-MCNC: 115 MG/DL (ref 65–99)
HCT VFR BLD AUTO: 35 % (ref 37.5–51)
HGB BLD-MCNC: 10.5 G/DL (ref 13–17.7)
IMM GRANULOCYTES # BLD AUTO: 0.06 10*3/MM3 (ref 0–0.05)
IMM GRANULOCYTES NFR BLD AUTO: 0.6 % (ref 0–0.5)
LYMPHOCYTES # BLD AUTO: 1.26 10*3/MM3 (ref 0.7–3.1)
LYMPHOCYTES NFR BLD AUTO: 11.9 % (ref 19.6–45.3)
MAGNESIUM SERPL-MCNC: 1.9 MG/DL (ref 1.6–2.6)
MCH RBC QN AUTO: 26.5 PG (ref 26.6–33)
MCHC RBC AUTO-ENTMCNC: 30 G/DL (ref 31.5–35.7)
MCV RBC AUTO: 88.4 FL (ref 79–97)
MONOCYTES # BLD AUTO: 0.45 10*3/MM3 (ref 0.1–0.9)
MONOCYTES NFR BLD AUTO: 4.3 % (ref 5–12)
NEUTROPHILS NFR BLD AUTO: 8.68 10*3/MM3 (ref 1.7–7)
NEUTROPHILS NFR BLD AUTO: 82 % (ref 42.7–76)
NRBC BLD AUTO-RTO: 0 /100 WBC (ref 0–0.2)
PHOSPHATE SERPL-MCNC: 4.3 MG/DL (ref 2.5–4.5)
PLATELET # BLD AUTO: 586 10*3/MM3 (ref 140–450)
PMV BLD AUTO: 11.6 FL (ref 6–12)
POTASSIUM SERPL-SCNC: 4.2 MMOL/L (ref 3.5–5.2)
PROT SERPL-MCNC: 6.5 G/DL (ref 6–8.5)
RBC # BLD AUTO: 3.96 10*6/MM3 (ref 4.14–5.8)
SODIUM SERPL-SCNC: 146 MMOL/L (ref 136–145)
WBC # BLD AUTO: 10.57 10*3/MM3 (ref 3.4–10.8)

## 2021-02-03 PROCEDURE — 74018 RADEX ABDOMEN 1 VIEW: CPT

## 2021-02-03 PROCEDURE — 25010000002 ZIPRASIDONE MESYLATE PER 10 MG: Performed by: NURSE PRACTITIONER

## 2021-02-03 PROCEDURE — 80053 COMPREHEN METABOLIC PANEL: CPT | Performed by: INTERNAL MEDICINE

## 2021-02-03 PROCEDURE — 25010000002 ENOXAPARIN PER 10 MG: Performed by: FAMILY MEDICINE

## 2021-02-03 PROCEDURE — 83735 ASSAY OF MAGNESIUM: CPT | Performed by: INTERNAL MEDICINE

## 2021-02-03 PROCEDURE — 85025 COMPLETE CBC W/AUTO DIFF WBC: CPT | Performed by: INTERNAL MEDICINE

## 2021-02-03 PROCEDURE — 94660 CPAP INITIATION&MGMT: CPT

## 2021-02-03 PROCEDURE — 94799 UNLISTED PULMONARY SVC/PX: CPT

## 2021-02-03 PROCEDURE — 25010000002 LORAZEPAM PER 2 MG: Performed by: INTERNAL MEDICINE

## 2021-02-03 PROCEDURE — 99232 SBSQ HOSP IP/OBS MODERATE 35: CPT | Performed by: INTERNAL MEDICINE

## 2021-02-03 PROCEDURE — 25010000002 MICAFUNGIN SODIUM 100 MG RECONSTITUTED SOLUTION: Performed by: INTERNAL MEDICINE

## 2021-02-03 PROCEDURE — 25010000002 DIAZEPAM PER 5 MG: Performed by: NURSE PRACTITIONER

## 2021-02-03 PROCEDURE — 25010000002 MEROPENEM PER 100 MG: Performed by: INTERNAL MEDICINE

## 2021-02-03 PROCEDURE — 82962 GLUCOSE BLOOD TEST: CPT

## 2021-02-03 PROCEDURE — 25010000002 FUROSEMIDE PER 20 MG: Performed by: INTERNAL MEDICINE

## 2021-02-03 PROCEDURE — 84100 ASSAY OF PHOSPHORUS: CPT | Performed by: INTERNAL MEDICINE

## 2021-02-03 PROCEDURE — 99233 SBSQ HOSP IP/OBS HIGH 50: CPT | Performed by: INTERNAL MEDICINE

## 2021-02-03 PROCEDURE — 25010000003 MAGNESIUM SULFATE 4 GM/100ML SOLUTION: Performed by: NURSE PRACTITIONER

## 2021-02-03 PROCEDURE — 25010000002 DIAZEPAM PER 5 MG: Performed by: INTERNAL MEDICINE

## 2021-02-03 RX ORDER — FUROSEMIDE 10 MG/ML
40 INJECTION INTRAMUSCULAR; INTRAVENOUS ONCE
Status: COMPLETED | OUTPATIENT
Start: 2021-02-03 | End: 2021-02-03

## 2021-02-03 RX ORDER — MAGNESIUM CARB/ALUMINUM HYDROX 105-160MG
296 TABLET,CHEWABLE ORAL ONCE
Status: COMPLETED | OUTPATIENT
Start: 2021-02-03 | End: 2021-02-03

## 2021-02-03 RX ADMIN — DEXMEDETOMIDINE 1 MCG/KG/HR: 100 INJECTION, SOLUTION, CONCENTRATE INTRAVENOUS at 18:37

## 2021-02-03 RX ADMIN — ENOXAPARIN SODIUM 40 MG: 40 INJECTION SUBCUTANEOUS at 20:50

## 2021-02-03 RX ADMIN — MAGNESIUM SULFATE 4 G: 4 INJECTION INTRAVENOUS at 06:37

## 2021-02-03 RX ADMIN — IPRATROPIUM BROMIDE AND ALBUTEROL SULFATE 3 ML: 2.5; .5 SOLUTION RESPIRATORY (INHALATION) at 18:42

## 2021-02-03 RX ADMIN — CLONIDINE HYDROCHLORIDE 0.1 MG: 0.1 TABLET ORAL at 20:50

## 2021-02-03 RX ADMIN — SODIUM CHLORIDE, PRESERVATIVE FREE 10 ML: 5 INJECTION INTRAVENOUS at 10:13

## 2021-02-03 RX ADMIN — QUETIAPINE FUMARATE 100 MG: 100 TABLET ORAL at 14:08

## 2021-02-03 RX ADMIN — FOLIC ACID 1 MG: 1 TABLET ORAL at 10:11

## 2021-02-03 RX ADMIN — IPRATROPIUM BROMIDE AND ALBUTEROL SULFATE 3 ML: 2.5; .5 SOLUTION RESPIRATORY (INHALATION) at 03:27

## 2021-02-03 RX ADMIN — MICAFUNGIN SODIUM 100 MG: 100 INJECTION, POWDER, LYOPHILIZED, FOR SOLUTION INTRAVENOUS at 10:12

## 2021-02-03 RX ADMIN — ZIPRASIDONE MESYLATE 20 MG: 20 INJECTION, POWDER, LYOPHILIZED, FOR SOLUTION INTRAMUSCULAR at 00:09

## 2021-02-03 RX ADMIN — MEROPENEM 2 G: 1 INJECTION, POWDER, FOR SOLUTION INTRAVENOUS at 17:28

## 2021-02-03 RX ADMIN — METOPROLOL TARTRATE 25 MG: 25 TABLET, FILM COATED ORAL at 10:33

## 2021-02-03 RX ADMIN — ENOXAPARIN SODIUM 40 MG: 40 INJECTION SUBCUTANEOUS at 10:12

## 2021-02-03 RX ADMIN — QUETIAPINE FUMARATE 100 MG: 100 TABLET ORAL at 23:02

## 2021-02-03 RX ADMIN — SODIUM CHLORIDE, PRESERVATIVE FREE 10 ML: 5 INJECTION INTRAVENOUS at 20:52

## 2021-02-03 RX ADMIN — DIAZEPAM 5 MG: 5 INJECTION, SOLUTION INTRAMUSCULAR; INTRAVENOUS at 11:36

## 2021-02-03 RX ADMIN — CLONIDINE HYDROCHLORIDE 0.1 MG: 0.1 TABLET ORAL at 10:32

## 2021-02-03 RX ADMIN — DEXMEDETOMIDINE 1.5 MCG/KG/HR: 100 INJECTION, SOLUTION, CONCENTRATE INTRAVENOUS at 04:36

## 2021-02-03 RX ADMIN — THIAMINE HCL TAB 100 MG 100 MG: 100 TAB at 10:11

## 2021-02-03 RX ADMIN — DEXMEDETOMIDINE 1.5 MCG/KG/HR: 100 INJECTION, SOLUTION, CONCENTRATE INTRAVENOUS at 14:14

## 2021-02-03 RX ADMIN — OXYCODONE HYDROCHLORIDE 15 MG: 15 TABLET ORAL at 05:36

## 2021-02-03 RX ADMIN — BISACODYL 10 MG: 10 SUPPOSITORY RECTAL at 14:08

## 2021-02-03 RX ADMIN — DIAZEPAM 5 MG: 5 INJECTION, SOLUTION INTRAMUSCULAR; INTRAVENOUS at 20:48

## 2021-02-03 RX ADMIN — OXYCODONE HYDROCHLORIDE 15 MG: 15 TABLET ORAL at 18:35

## 2021-02-03 RX ADMIN — OXYCODONE HYDROCHLORIDE 15 MG: 15 TABLET ORAL at 11:36

## 2021-02-03 RX ADMIN — SENNOSIDES 10 ML: 8.8 LIQUID ORAL at 20:50

## 2021-02-03 RX ADMIN — OXYCODONE HYDROCHLORIDE 15 MG: 15 TABLET ORAL at 23:08

## 2021-02-03 RX ADMIN — QUETIAPINE FUMARATE 100 MG: 100 TABLET ORAL at 05:36

## 2021-02-03 RX ADMIN — Medication 296 ML: at 10:57

## 2021-02-03 RX ADMIN — IPRATROPIUM BROMIDE AND ALBUTEROL SULFATE 3 ML: 2.5; .5 SOLUTION RESPIRATORY (INHALATION) at 22:44

## 2021-02-03 RX ADMIN — ESCITALOPRAM OXALATE 20 MG: 20 TABLET ORAL at 10:11

## 2021-02-03 RX ADMIN — METOPROLOL TARTRATE 25 MG: 25 TABLET, FILM COATED ORAL at 20:50

## 2021-02-03 RX ADMIN — LORAZEPAM 2 MG: 2 INJECTION INTRAMUSCULAR; INTRAVENOUS at 23:06

## 2021-02-03 RX ADMIN — IPRATROPIUM BROMIDE AND ALBUTEROL SULFATE 3 ML: 2.5; .5 SOLUTION RESPIRATORY (INHALATION) at 07:07

## 2021-02-03 RX ADMIN — IPRATROPIUM BROMIDE AND ALBUTEROL SULFATE 3 ML: 2.5; .5 SOLUTION RESPIRATORY (INHALATION) at 11:17

## 2021-02-03 RX ADMIN — PANTOPRAZOLE SODIUM 40 MG: 40 INJECTION, POWDER, LYOPHILIZED, FOR SOLUTION INTRAVENOUS at 05:36

## 2021-02-03 RX ADMIN — MEROPENEM 2 G: 1 INJECTION, POWDER, FOR SOLUTION INTRAVENOUS at 23:08

## 2021-02-03 RX ADMIN — FUROSEMIDE 40 MG: 10 INJECTION INTRAMUSCULAR; INTRAVENOUS at 10:11

## 2021-02-03 RX ADMIN — MEROPENEM 2 G: 1 INJECTION, POWDER, FOR SOLUTION INTRAVENOUS at 10:11

## 2021-02-03 NOTE — PAYOR COMM NOTE
"María Elena Garcia RN Utilization Review [phone]  Fax # [phone]  Ref # [id number]        Debi Gunter (46 y.o. Male)     Date of Birth Social Security Number Address Home Phone MRN    1975  [address] [place] [address] [phone] [id number]    Amish Marital Status          Non-Jain Single       Admission Date Admission Type Admitting Provider Attending Provider Department, Room/Bed    1/19/21 Emergency Jeanie Page DO Case, Theresa V., DO [facility] 2A ICU, N204/1    Discharge Date Discharge Disposition Discharge Destination                       Attending Provider: Jeanie Page DO    Allergies: Penicillins    Isolation: None   Infection: None   Code Status: CPR    Ht: 188 cm (74.02\")   Wt: 206 kg (454 lb 14.4 oz)    Admission Cmt: None   Principal Problem: Acute pancreatitis. Alcoholic +/- hyperlipidemia induced [K85.90]                 Active Insurance as of 1/19/2021     Primary Coverage     Payor Plan Insurance Group Employer/Plan Group    [employer] [place] [employer] [place]ICA[place]      Payor Plan Address Payor Plan Phone Number Payor Plan Fax Number Effective Dates    [address] [address] [phone]  7/17/2017 - None Entered    [facility] [address]       Subscriber Name Subscriber Birth Date Member ID       DEBI GUNTER 1975 [id number]                 Emergency Contacts      (Rel.) Home Phone Work Phone Mobile Phone    Roxanne Gunter (Daughter) -- -- [phone]    GunterShira nice (Daughter) [phone] -- --    kareem gunter (Friend) [phone] -- [phone]    Rea Mccurdy (Significant Other) [phone] -- --            Vital Signs (last day)     Date/Time   Temp   Temp src   Pulse   Resp   BP   Patient Position   SpO2    02/03/21 0707   --   --   71   (!) 31   --   --   97    02/03/21 0543   99.3 (37.4)   Bladder   --   --   --   --   --    02/03/21 0530   --   --   74   --   112/72   --   98    02/03/21 0430   --   --   80  "  --   124/79   --   97    02/03/21 0400   100 (37.8)   Bladder   75   (!) 31   120/75   Lying   97    02/03/21 0330   --   --   86   --   126/75   --   98    02/03/21 0327   --   --   85   27   --   --   97    02/03/21 0300   --   --   86   --   116/67   --   98    02/03/21 0230   --   --   82   --   120/66   --   97    02/03/21 0200   --   --   81   --   111/69   --   96    02/03/21 0130   --   --   90   --   118/72   --   96    02/03/21 0100   --   --   96   --   127/68   --   95    02/03/21 0030   --   --   108   --   137/78   --   94    02/03/21 0029   --   --   108   --   --   --   93    02/03/21 0000   (!) 102.4 (39.1)   Bladder   (!) 147   --   --   --   (!) 88    02/02/21 2300   (!) 102 (38.9)   --   108   (!) 40   (!) 167/107   Lying   95    02/02/21 2200   --   --   106   --   (!) 151/107   --   93    02/02/21 2130   --   --   101   --   139/90   --   (!) 88    02/02/21 2100   --   --   (!) 128   --   (!) 158/103   --   --    02/02/21 2030   --   --   103   --   (!) 141/103   --   99    02/02/21 2026   --   --   105   20   --   --   91    02/02/21 2000   (!) 101.1 (38.4)   Bladder   102   (!) 32   145/92   Lying   95    02/02/21 1930   --   --   107   --   162/96   --   91    02/02/21 1900   --   --   99   --   161/87   --   96    02/02/21 1800   --   --   98   (!) 34   141/91   --   100    02/02/21 1700   --   --   97   --   134/93   --   97    02/02/21 1601   --   --   89   --   --   --   98    02/02/21 1600   99.3 (37.4)   Bladder   87   (!) 32   123/74   --   98    02/02/21 1500   --   --   74   --   118/75   --   97    02/02/21 1400   --   --   73   (!) 31   113/72   --   96    02/02/21 1300   --   --   81   --   112/70   --   97    02/02/21 1230   --   --   89   --   124/66   --   97    02/02/21 1205   --   --   99   --   --   --   96    02/02/21 1201   --   --   112   --   --   --   97    02/02/21 1200   (!) 100.6 (38.1)   Bladder   (!) 124   (!) 32   (!) 170/115   --   (!) 87    02/02/21 1100   --    --   89   --   139/41   --   91    02/02/21 1000   --   --   80   26   139/99   --   91    02/02/21 0908   --   --   72   --   115/75   --   --    02/02/21 0900   --   --   71   --   115/75   --   97    02/02/21 0800   98.2 (36.8)   Bladder   81   (!) 32   126/73   Lying   98    02/02/21 0752   --   --   81   --   --   --   99    02/02/21 0630   --   --   95   --   138/83   --   100    02/02/21 0620   (!) 100.8 (38.2)   Bladder   92   --   --   --   99    02/02/21 0615   --   --   94   --   --   --   99    02/02/21 0600   --   --   98   --   152/99   --   97    02/02/21 0540   (!) 101.3 (38.5)   Bladder   101   --   --   --   97    02/02/21 0530   --   --   96   --   (!) 155/104   --   95    02/02/21 0500   --   --   98   --   149/87   --   96    02/02/21 0430   --   --   96   --   135/81   --   96    02/02/21 0400   (!) 101.1 (38.4)   Bladder   100   (!) 31   171/98   Lying   93    02/02/21 0330   --   --   93   --   136/92   --   97    02/02/21 0300   --   --   92   --   142/83   --   98    02/02/21 0230   --   --   94   --   144/93   --   100    02/02/21 0130   --   --   98   --   149/100   --   99    02/02/21 0125   --   --   97   --   --   --   97    02/02/21 0030   --   --   110   --   158/100   --   100    02/02/21 0013   --   --   98   --   --   --   96    02/02/21 0000   (!) 102 (38.9)   Bladder   98   (!) 32   (!) 146/113   Lying   95              Current Facility-Administered Medications   Medication Dose Route Frequency Provider Last Rate Last Admin   • acetaminophen (TYLENOL) 160 MG/5ML solution 650 mg  650 mg Nasogastric Q6H PRN Raphael Gutierrez MD   649.6 mg at 02/02/21 2346   • bisacodyl (DULCOLAX) suppository 10 mg  10 mg Rectal Daily Brunner, Mark I, MD   10 mg at 02/02/21 0910   • cloNIDine (CATAPRES) tablet 0.1 mg  0.1 mg Nasogastric Q12H Raphael Gutierrez MD   0.1 mg at 02/02/21 2111   • DEXMEDETOMIDINE 1000 MCG/250ML INFUSION infusion  0.2-1.5 mcg/kg/hr Intravenous Titrated Bharath Rojas,  MD 55.5 mL/hr at 02/03/21 0436 1.5 mcg/kg/hr at 02/03/21 0436   • diazePAM (VALIUM) injection 5 mg  5 mg Intravenous Q6H Case, Jeanie V., DO   5 mg at 02/02/21 2333   • enoxaparin (LOVENOX) syringe 40 mg  40 mg Subcutaneous Q12H Ilda Collins MD   40 mg at 02/02/21 2111   • escitalopram (LEXAPRO) tablet 20 mg  20 mg Nasogastric Daily Raphael Gutierrez MD   20 mg at 02/02/21 0909   • folic acid (FOLVITE) tablet 1 mg  1 mg Nasogastric Daily Hanna Dickinson, PharmD   1 mg at 02/02/21 0909   • furosemide (LASIX) injection 40 mg  40 mg Intravenous Once Case, Jeanie V., DO       • hydrALAZINE (APRESOLINE) injection 10 mg  10 mg Intravenous Q6H PRN Sandy Rae DO   10 mg at 01/24/21 0108   • influenza vac split quad (FLUZONE,FLUARIX,AFLURIA,FLULAVAL) injection 0.5 mL  0.5 mL Intramuscular During Hospitalization Alexia Velasquez APRN       • ipratropium-albuterol (DUO-NEB) nebulizer solution 3 mL  3 mL Nebulization Q4H - RT Raphael Gutierrez MD   3 mL at 02/03/21 0707   • ipratropium-albuterol (DUO-NEB) nebulizer solution 3 mL  3 mL Nebulization Q4H PRN Nicanor Robles APRN   3 mL at 01/26/21 0511   • LORazepam (ATIVAN) injection 2 mg  2 mg Intravenous Q2H PRN Elian Buitrago MD   2 mg at 02/02/21 2242   • magnesium citrate solution 296 mL  296 mL Oral Once Vipin Holliday MD       • Magnesium Sulfate 2 gram Bolus, followed by 8 gram infusion (total Mg dose 10 grams)- Mg less than or equal to 1mg/dL  2 g Intravenous PRN Twin Doss APRN        Or   • Magnesium Sulfate 2 gram / 50mL Infusion (GIVE X 3 BAGS TO EQUAL 6GM TOTAL DOSE) - Mg 1.1 - 1.5 mg/dl  2 g Intravenous PRN Twin Doss APRN        Or   • Magnesium Sulfate 4 gram infusion- Mg 1.6-1.9 mg/dL  4 g Intravenous PRN Twin Doss APRN 25 mL/hr at 02/03/21 0637 4 g at 02/03/21 0637   • meropenem (MERREM) 2 g in sodium chloride 0.9 % 100 mL IVPB  2 g Intravenous Q8H Bharath Rojas MD   2 g at 02/02/21 8159   •  methylnaltrexone (RELISTOR) injection 12 mg  12 mg Subcutaneous Every Other Day Brunner, Mark I, MD   12 mg at 02/02/21 0908   • metoprolol tartrate (LOPRESSOR) injection 2.5 mg  2.5 mg Intravenous Q6H PRN Hanna Arita, APRMONICA   2.5 mg at 01/21/21 1702   • metoprolol tartrate (LOPRESSOR) tablet 25 mg  25 mg Nasogastric Q12H Raphael Gutierrez MD   25 mg at 02/02/21 2111   • micafungin 100 mg/100 mL 0.9% NS IVPB (mbp)  100 mg Intravenous Daily Bharath Rojas MD   100 mg at 02/02/21 0909   • midazolam (VERSED) injection 5 mg  5 mg Intravenous Q2H PRN Bharath Rojas MD   5 mg at 01/31/21 0157   • nicotine (NICODERM CQ) 21 MG/24HR patch 1 patch  1 patch Transdermal Daily PRN Alexia Velasquez, APRN       • ondansetron (ZOFRAN) injection 4 mg  4 mg Intravenous Q6H PRN Sandy Rae, DO   4 mg at 01/20/21 2319   • oxyCODONE (ROXICODONE) immediate release tablet 15 mg  15 mg Nasogastric Q6H Hanna Dickinson, PharmD   15 mg at 02/03/21 0536   • pantoprazole (PROTONIX) injection 40 mg  40 mg Intravenous Q AM Jeanie Page V., DO   40 mg at 02/03/21 0536   • potassium chloride (KLOR-CON) packet 40 mEq  40 mEq Oral PRN Nicanor Robles APRN   40 mEq at 01/25/21 1445    Or   • potassium chloride 20 mEq in 50 mL IVPB  20 mEq Intravenous Q1H PRN Nicanor Robles, APRMONICA       • potassium phosphate 45 mmol in sodium chloride 0.9 % 250 mL infusion  45 mmol Intravenous PRN Nicanor Robles APRN 41.7 mL/hr at 01/25/21 0824 45 mmol at 01/25/21 0824    Or   • potassium phosphate 30 mmol in sodium chloride 0.9 % 100 mL infusion  30 mmol Intravenous PRN Nicanor Robles, EMIGDIO        Or   • potassium phosphate 15 mmol in sodium chloride 0.9 % 100 mL infusion  15 mmol Intravenous PRN Nicanor Robles APRN        Or   • sodium phosphates 45 mmol in sodium chloride 0.9 % 250 mL IVPB  45 mmol Intravenous PRNicanor Martinez APRN        Or   • sodium phosphates 30 mmol in sodium chloride 0.9 % 100 mL IVPB  30 mmol Intravenous  PRN Nicanor Robles APRN 25 mL/hr at 01/24/21 1231 30 mmol at 01/24/21 1231    Or   • sodium phosphates 15 mmol in sodium chloride 0.9 % 100 mL IVPB  15 mmol Intravenous PRN Nicanor Robles APRN 50 mL/hr at 01/26/21 0115 15 mmol at 01/26/21 0115   • QUEtiapine (SEROquel) tablet 100 mg  100 mg Nasogastric Q8H Alexia Velasquez APRN   100 mg at 02/03/21 0536   • sennosides (SENOKOT) 8.8 MG/5ML syrup 10 mL  10 mL Nasogastric Nightly Bharath Rojas MD   10 mL at 02/02/21 2111   • simethicone (MYLICON) 40 MG/0.6ML drops 120 mg  120 mg Per J Tube 4x Daily PRN Brunner, Mark I, MD   120 mg at 01/31/21 1722   • sodium chloride 0.9 % flush 10 mL  10 mL Intravenous Q12H Raphael Gutierrez MD   10 mL at 02/02/21 2111   • sodium chloride 0.9 % flush 10 mL  10 mL Intravenous PRN Raphael Gutierrez MD       • thiamine (VITAMIN B-1) tablet 100 mg  100 mg Nasogastric Daily aRphael Gutierrez MD   100 mg at 02/02/21 0909     Orders (active)      Start     Ordered    02/03/21 1015  furosemide (LASIX) injection 40 mg  Once      02/03/21 0921    02/03/21 0945  magnesium citrate solution 296 mL  Once      02/03/21 0848    02/03/21 0923  Diet, Tube Feeding Tube Feeding Formula: Peptamen Intense VHP (Peptide Based, Very High Protein)  Diet Effective Now      02/03/21 0922    02/03/21 0600  QUEtiapine (SEROquel) tablet 100 mg  Every 8 Hours Scheduled      02/02/21 2339    02/03/21 0358  Restraints Non-Violent or Non-Self Destructive  Calendar Day      02/03/21 0357    02/01/21 1400  diazePAM (VALIUM) injection 5 mg  Every 6 Hours      02/01/21 0913    02/01/21 0752  Magnesium Sulfate 2 gram Bolus, followed by 8 gram infusion (total Mg dose 10 grams)- Mg less than or equal to 1mg/dL  As Needed      02/01/21 0752    02/01/21 0752  Magnesium Sulfate 2 gram / 50mL Infusion (GIVE X 3 BAGS TO EQUAL 6GM TOTAL DOSE) - Mg 1.1 - 1.5 mg/dl  As Needed      02/01/21 0752    02/01/21 0752  Magnesium Sulfate 4 gram infusion- Mg 1.6-1.9 mg/dL  As  Needed      02/01/21 0752    02/01/21 0430  NIPPV (CPAP or BIPAP)  As Needed-RT      02/01/21 0431    01/31/21 2236  LORazepam (ATIVAN) injection 2 mg  Every 2 Hours PRN      01/31/21 2236    01/31/21 0900  folic acid (FOLVITE) tablet 1 mg  Daily      01/30/21 0809    01/30/21 1900  bisacodyl (DULCOLAX) suppository 10 mg  Daily      01/30/21 1811    01/30/21 1810  simethicone (MYLICON) 40 MG/0.6ML drops 120 mg  4 Times Daily PRN      01/30/21 1811    01/30/21 1806  Resume trophic tube feedings at 20ml/hr  Nursing Communication  Once     Comments: Resume trophic tube feedings at 20ml/hr    01/30/21 1806    01/30/21 1200  oxyCODONE (ROXICODONE) immediate release tablet 15 mg  Every 6 Hours      01/30/21 0809    01/29/21 1900  methylnaltrexone (RELISTOR) injection 12 mg  Every Other Day      01/29/21 1800    01/29/21 1800  NG Tube to Low Wall Suction  Continuous      01/29/21 1800    01/29/21 1759  Hold Tube Feeding  Until Discontinued     Comments: Until MD reassess in AM    01/29/21 1800    01/29/21 1220  Apply Cooling [place]  Once      01/29/21 1220    01/29/21 1029  midazolam (VERSED) injection 5 mg  Every 2 Hours PRN      01/29/21 1029    01/29/21 0900  DEXMEDETOMIDINE 1000 MCG/250ML INFUSION infusion  Titrated      01/29/21 0812    01/28/21 1130  micafungin 100 mg/100 mL 0.9% NS IVPB (mbp)  Daily      01/28/21 1034    01/28/21 0034  influenza vac split quad (FLUZONE,FLUARIX,AFLURIA,FLULAVAL) injection 0.5 mL  During Hospitalization      01/28/21 0034    01/28/21 0033  nicotine (NICODERM CQ) 21 MG/24HR patch 1 patch  Daily PRN      01/28/21 0034    01/27/21 1548  Resume tube feeding (post-pyloric)  Nursing Communication  Once     Comments: Resume tube feeding (post-pyloric)    01/27/21 1548    01/27/21 1217  Respiratory communication  Once     Comments: Decrease set rate on ventilator to 16 breaths/min    01/27/21 1217    01/26/21 0600  CBC & Differential  Daily      01/25/21 1743    01/25/21 2100  sennosides  (SENOKOT) 8.8 MG/5ML syrup 10 mL  Nightly      01/25/21 1020    01/25/21 1519  Obtain Medical Records  Until Discontinued     Comments: Please try to get current medication list and recent office notes from PCP.    01/25/21 1519    01/25/21 0900  cloNIDine (CATAPRES) tablet 0.1 mg  Every 12 Hours Scheduled      01/25/21 0734    01/25/21 0900  escitalopram (LEXAPRO) tablet 20 mg  Daily      01/25/21 0734    01/25/21 0900  metoprolol tartrate (LOPRESSOR) tablet 25 mg  Every 12 Hours Scheduled      01/25/21 0734    01/25/21 0900  thiamine (VITAMIN B-1) tablet 100 mg  Daily      01/25/21 0734    01/25/21 0624  Ventilator - AC/VC+; (18); 90%; 5; 6  Continuous      01/25/21 0623 01/25/21 0621  potassium chloride (KLOR-CON) packet 40 mEq  As Needed      01/25/21 0621 01/25/21 0621  potassium chloride 20 mEq in 50 mL IVPB  Every 1 Hour PRN      01/25/21 0621 01/24/21 1953  Daily Weights  Daily      01/24/21 1952 01/24/21 1951  Residual Volume Assessment - Gastric Feedings  Per Order Details     Comments: Measure Gastric Residual Volume If Patient Complains of Nausea, Abdominal Distention, Pain, Tenderness or Firmness  If Residual Volume Greater Than 500 mL, Re-Feed Amount That Was Removed, Hold Tube Feeding For 1 Hour & Recheck Residual.  Notify Provider If Second Residual Remains Greater Than 500 mL  Document Residual Volume, Amount Discarded & Appearance of Residual.    01/24/21 1952 01/24/21 1951  Monitor Adequacy Ratio  Continuous      01/24/21 1952 01/24/21 1950  Elevate Head Of Bed 30-45 Degrees  Continuous      01/24/21 1952 01/24/21 1950  Administer Tube Feeding Via Feeding Tube Already in Place  Continuous      01/24/21 1952 01/24/21 1950  Strict Intake & Output  Every Shift      01/24/21 1952 01/24/21 1950  Write Hang Time on Enteral Feeding Bag  Per Order Details     Comments: Enteral Nutrition Bag May Hang For 24 Hours.  If Cans in Bags, Hang Time Limited to 4 Hours.    01/24/21 1952     01/24/21 1950  Notify Provider - Abdominal Discomfort, Pain or Distention, No Bowel Movement in 3 Days  Until Discontinued      01/24/21 1952    01/24/21 1946  Intermittently use a coudé catheter to suction patient and direct the tip towards the left lung.  Physician Communication Order  Per Order Details     Comments: Intermittently use a coudé catheter to suction patient and direct the tip towards the left lung.    01/24/21 1946    01/24/21 1245  fentaNYL 2500 mcg/250 mL NS infusion  Titrated      01/24/21 1147    01/24/21 0700  Patient Currently On Electrolyte Replacement Protocol - Please Refer to MAR for Protocol Details  Misc Nursing Order (Specify)  Daily     Comments: Patient Currently On Electrolyte Replacement Protocol - Please Refer to MAR for Protocol Details    01/24/21 0659    01/24/21 0659  potassium phosphate 45 mmol in sodium chloride 0.9 % 250 mL infusion  As Needed      01/24/21 0659    01/24/21 0659  potassium phosphate 30 mmol in sodium chloride 0.9 % 100 mL infusion  As Needed      01/24/21 0659    01/24/21 0659  potassium phosphate 15 mmol in sodium chloride 0.9 % 100 mL infusion  As Needed      01/24/21 0659    01/24/21 0659  sodium phosphates 45 mmol in sodium chloride 0.9 % 250 mL IVPB  As Needed      01/24/21 0659    01/24/21 0659  sodium phosphates 30 mmol in sodium chloride 0.9 % 100 mL IVPB  As Needed      01/24/21 0659    01/24/21 0659  sodium phosphates 15 mmol in sodium chloride 0.9 % 100 mL IVPB  As Needed      01/24/21 0659    01/24/21 0600  PICC Site Care  Weekly     Comments: Dressing Changes to PICC Site Every 7 Days and As Needed    01/23/21 1231    01/24/21 0400  Oral Care & Teeth Brushing  Every 4 Hours     Comments: Delmar Teeth at Least 2x/day    01/24/21 0135    01/24/21 0330  acetylcysteine (MUCOMYST) 20 % nebulizer solution 4 mL  Every 4 Hours - RT      01/24/21 0158    01/24/21 0158  ipratropium-albuterol (DUO-NEB) nebulizer solution 3 mL  Every 4 Hours PRN       01/24/21 0158    01/24/21 0136  Spontaneous Awakening Trial  Daily      01/24/21 0135    01/24/21 0134  NPO Diet  Diet Effective Now      01/24/21 0135    01/24/21 0134  Elevate HOB  Continuous      01/24/21 0135    01/24/21 0134  RN May Place Order For ABG As Needed for Respiratory Distress  Continuous      01/24/21 0135    01/23/21 1530  ipratropium-albuterol (DUO-NEB) nebulizer solution 3 mL  Every 4 Hours - RT      01/23/21 1258    01/23/21 1330  sodium chloride 0.9 % flush 10 mL  Every 12 Hours Scheduled      01/23/21 1231    01/23/21 1300  acetaminophen (TYLENOL) 160 MG/5ML solution 650 mg  Every 6 Hours PRN      01/23/21 1255    01/23/21 1207  Catheter Care PICC  Per Order Details     Comments: 1) Follow PICC Protocol For Care  2) No Blood Pressure in Arm With PICC  3) Warm Packs to PICC Arm As Needed For Discomfort  4) Measure & Record Arm Circumference if Patient Experiences Pain, Swelling, Redness or Warmth in PICC Arm; Compare Measurement to Initial Measure, Report to Provider if Greater Than 3cm Difference  5) Follow Flush Protocol Per Facility    01/23/21 1231    01/23/21 1207  Ensure PICC Positive Pressure Cap is In Place  Per Order Details     Comments: Change Every 3 Days & As Needed For Evidence of Infusate or Blood in Cap    01/23/21 1231    01/23/21 1207  No Venipuncture or BP in PICC Arm  Continuous     Comments: Right arm    01/23/21 1231    01/23/21 1207  Verify Informed Consent for PICC Line Placement  Once      01/23/21 1231    01/23/21 1207  Ensure PICC Securing Device is in Use  Continuous      01/23/21 1231    01/23/21 1207  Do NOT Draw From PICC  Continuous      01/23/21 1231    01/23/21 1207  May Use PICC for Power Injected CT  Continuous      01/23/21 1231    01/23/21 1207  Remove PICC Cap for CT  Continuous      01/23/21 1231    01/23/21 1207  No Dilantin Through PICC  Continuous      01/23/21 1231    01/23/21 1207  Notify Provider if PICC is Occluded  Until Discontinued      01/23/21  1231    01/23/21 1206  sodium chloride 0.9 % flush 10 mL  As Needed      01/23/21 1231    01/23/21 0000  meropenem (MERREM) 2 g in sodium chloride 0.9 % 100 mL IVPB  Every 8 Hours      01/22/21 1546    01/22/21 0600  pantoprazole (PROTONIX) injection 40 mg  Every Early Morning      01/22/21 0444    01/22/21 0107  Nasogastric Tube Maintenance  Continuous      01/22/21 0115    01/21/21 2214  Straight Cath  Once      01/21/21 2213    01/20/21 2100  enoxaparin (LOVENOX) syringe 40 mg  Every 12 Hours      01/20/21 1230    01/20/21 1459  HYDROcodone-acetaminophen (NORCO) 5-325 MG per tablet 1 tablet  Every 6 Hours PRN      01/20/21 1500    01/20/21 0535  Turn Cough Deep Breathe  Once      01/20/21 0534    01/20/21 0027  metoprolol tartrate (LOPRESSOR) injection 2.5 mg  Every 6 Hours PRN      01/20/21 0027    01/19/21 1600  Vital Signs  Every 4 Hours      01/19/21 1408    01/19/21 1438  Seizure Precautions  Continuous      01/19/21 1437    01/19/21 1438  Fall Precautions  Continuous      01/19/21 1437    01/19/21 1436  hydrALAZINE (APRESOLINE) injection 10 mg  Every 6 Hours PRN      01/19/21 1436    01/19/21 1435  Continuous Pulse Oximetry  Continuous      01/19/21 1434    01/19/21 1435  Clinical [place] Withdrawal Assessment (CIWA-Ar)  Per Hospital Policy      01/19/21 1434    01/19/21 1435  If CIWA Score Less Than 8 Monitor Every 4 Hours & Then Discontinue Assessment - Restart Scoring Assessment & Protocol If Symptoms Reappear  Continuous      01/19/21 1434    01/19/21 1435  Obtain Pre & Post Sedation Scores With Every Lorazepam Dose - Hold For POSS Greater Than 2 or RASS of -3 or Less  Continuous      01/19/21 1434    01/19/21 1435  Seizure Precautions  Continuous      01/19/21 1434    01/19/21 1435  Safety Precautions  Continuous      01/19/21 1434    01/19/21 1408  Cardiac Monitoring  Continuous      01/19/21 1408    01/19/21 1408  Activity - Ad Urvashi  Until Discontinued      01/19/21 1408    01/19/21 1407   "ondansetron (ZOFRAN) injection 4 mg  Every 6 Hours PRN      01/19/21 1408    01/19/21 1407  Code Status and Medical Interventions:  Continuous      01/19/21 1408    01/19/21 1407  Intake & Output  Every Shift      01/19/21 1408    01/19/21 1407  Insert Peripheral IV  Once      01/19/21 1408    01/19/21 1407  Saline Lock & Maintain IV Access  Continuous      01/19/21 1408    01/19/21 0937  Insert peripheral IV  Once      01/19/21 0936    01/19/21 0932  Insert Peripheral IV  Once      01/19/21 0932    Unscheduled  Subglottic Suctioning Must Be Done Every 6 Hours  As Needed      01/24/21 0135    Unscheduled  Magnesium  As Needed      01/24/21 0659    Unscheduled  Potassium  As Needed      01/24/21 0659    Unscheduled  Vipin and Document Tube Depth (in cm)  As Needed      01/24/21 1952    Unscheduled  Flush Feeding Tube With 30-50mL Water As Needed  As Needed      01/24/21 1952    Signed and Held  XR Chest 1 View  1 Time Imaging      Signed and Held                Operative/Procedure Notes (last 24 hours) (Notes from 02/02/21 0931 through 02/03/21 0931)    No notes of this type exist for this encounter.            Physician Progress Notes (last 24 hours) (Notes from 02/02/21 0931 through 02/03/21 0931)      Vipin Holliday MD at 02/03/21 0849          GI Daily Progress Note  Subjective     Titus Bro is a 46 y.o. male who was admitted with Acute pancreatitis.   Restless last night.  Now sedated.  Needed BM and did not want to use bedpain.  Temp 102.4 at MN  Chief Complaint:  pancreatitis    Objective     /72   Pulse 71   Temp 99.3 °F (37.4 °C) (Bladder)   Resp (!) 31   Ht 188 cm (74.02\")   Wt (!) 206 kg (454 lb 14.4 oz) Comment: per bed scale   SpO2 97%   BMI 58.38 kg/m²     Intake/Output last 3 shifts:  I/O last 3 completed shifts:  In: 5778.8 [I.V.:2504.8; Other:870; NG/GT:2004; IV Piggyback:400]  Out: 2825 [Urine:2475; Emesis/NG output:350]  Intake/Output this shift:  No intake/output data " recorded.      Physical Exam  Wt Readings from Last 3 Encounters:   02/01/21 (!) 206 kg (454 lb 14.4 oz)   06/10/20 (!) 152 kg (335 lb)   08/07/19 (!) 144 kg (318 lb)   ,body mass index is 58.38 kg/m².,@FLOWAMB(6)@,@FLOWAMB(5)@,@FLOWAMB(8)@   CONSTITUTIONAL:sedated  Resp CTA; no rhonchi, rales, or wheezes.  Respiration effort normal  CV RRR; no M/R/G. No lower extremity edema, obese  GI Abd soft, distened,  Decreased bowel sounds..  Tympanitic    Psych:     DATA:    Assessment/Plan     1. Severe acute alcoholic pancreatitis with necrosis.  2. Systemic inflammatory response syndrome with fever.  3. Intermittent ileus secondary to abdominal inflammation from pancreatitis, aggravated by opiates.  4. Morbid obesity.    Will give trial of Mag citrate  If no results will need KUB vs repeat CT abd/pelvis  Continue TF        Acute pancreatitis. Alcoholic +/- hyperlipidemia induced    Alcohol abuse    Essential hypertension    GERD without esophagitis    Class 3 severe obesity in adult     LIANA    Alcohol withdrawal     Hypertriglyceridemia    Fatty liver    Acute hypoxemic respiratory failure requiring NIV. ICU transfer 1/22/2021 (CMS/[facility])    Fever and increased procalcitonin. ? source       LOS: 15 days     Vipin Holliday MD  02/03/21  08:49 EST    Electronically signed by Vipin Holliday MD at 02/03/21 0859     Jeanie Page DO at 02/02/21 1059          INTENSIVIST   PROGRESS NOTE     Hospital:  LOS: 14 days     Mr. Titus Bro, 46 y.o. male is followed for a Chief Complaint of: Encephalopathy      Subjective   S     Interval History:  No acute events. Remains confused on Precedex.        The patient's relevant past medical, surgical and social history were reviewed and updated in Epic as appropriate.      ROS: Unable to obtain secondary to mental status.     Objective   O     Vitals:  Temp  Min: 98.2 °F (36.8 °C)  Max: 102 °F (38.9 °C)  BP  Min: 115/75  Max: 171/98  Pulse  Min: 71  Max: 110  Resp  Min: 26  Max:  32  SpO2  Min: 90 %  Max: 100 % Flow (L/min)  Min: 2  Max: 4    Intake/Ouptut 24 hrs (7:00AM - 6:59 AM)  Intake & Output (last 3 days)       01/30 0701 - 01/31 0700 01/31 0701 - 02/01 0700 02/01 0701 - 02/02 0700 02/02 0701 - 02/03 0700    I.V. (mL/kg) 2126 (14.4) 1372.7 (6.7) 1707.6 (8.3) 315.1 (1.5)    Other 501 388 541 270    NG/ 230 1760 334    IV Piggyback 350 286 200 96.5    Total Intake(mL/kg) 3298 (22.3) 2672.7 (13) 3698.6 (18) 1015.6 (4.9)    Urine (mL/kg/hr) 2005 (0.6) 4425 (0.9) 2045 (0.4) 400 (0.5)    Emesis/NG output 450 720 520     Stool  0      Total Output 2455 5145 2565 400    Net +843 -2472.3 +1133.6 +615.6            Stool Unmeasured Occurrence  1 x            Medications (drips):  dexmedetomidine, Last Rate: 1 mcg/kg/hr (02/02/21 1044)          Physical Examination  Telemetry:  Normal sinus rhythm.    Constitutional:  No acute distress.  Resting in bed.    Eyes: No scleral icterus.   PERRL, EOM intact.    Neck:  Supple, FROM   Cardiovascular: Normal rate, regular and rhythm. Normal heart sounds.  No murmurs, gallop or rub.   Respiratory: No respiratory distress. Normal respiratory effort.  Coarse rhonchi bilaterally.    Abdominal:  Soft. No masses. Nontender. No distension. No HSM.   Extremities: No digital cyanosis. No clubbing.  No peripheral edema.   Skin: No rashes, lesions or ulcers   Neurological:   Alert but disoriented. Mumbling incoherently.               Results from last 7 days   Lab Units 02/02/21  0751 02/01/21  0339 01/31/21  0636   WBC 10*3/mm3 12.18* 11.38* 12.29*   HEMOGLOBIN g/dL 10.3* 10.7* 11.0*   MCV fL 87.7 90.7 90.2   PLATELETS 10*3/mm3 502* 396 336     Results from last 7 days   Lab Units 02/02/21  0751 02/01/21  0339 01/31/21  0636   SODIUM mmol/L 146* 147* 141   POTASSIUM mmol/L 4.0 4.1 4.4   CO2 mmol/L 30.0* 30.0* 28.0   CREATININE mg/dL 0.60* 0.69* 0.65*   GLUCOSE mg/dL 136* 124* 123*   MAGNESIUM mg/dL 1.7 1.7  --    PHOSPHORUS mg/dL 4.2 3.5 3.3     Estimated  Creatinine Clearance: 287.2 mL/min (A) (by C-G formula based on SCr of 0.6 mg/dL (L)).  Results from last 7 days   Lab Units 02/01/21  0339   ALK PHOS U/L 83   BILIRUBIN mg/dL 0.3   ALT (SGPT) U/L 22   AST (SGOT) U/L 60*       Results from last 7 days   Lab Units 02/01/21  0140   PH, ARTERIAL pH units 7.422   PCO2, ARTERIAL mm Hg 49.4*   PO2 ART mm Hg 79.9*   FIO2 % 33       Images:    Imaging Results (Last 24 Hours)     Procedure Component Value Units Date/Time    XR Chest 1 View [[id number]] Collected: 02/02/21 0844     Updated: 02/02/21 1056    Narrative:      EXAMINATION: XR CHEST 1 VW- 02/02/2021     INDICATION: f/u respiratory illness; K85.90-Acute pancreatitis without  necrosis or infection, unspecified      COMPARISON: Chest x-ray 02/01/2021     FINDINGS: Support hardware unchanged and in satisfactory positioning.  Cardiac size enlarged. Low lung volumes with hypoventilatory effects and  patchy opacifications in the midlungs stable to slightly increased from  prior. No pneumothorax or pleural effusion.          Impression:      Low lung volumes with hypoventilatory effects and patchy  opacifications in the midlungs stable to slightly increased from prior.  No pneumothorax or pleural effusion.     D:  02/02/2021  E:  02/02/2021     This report was finalized on 2/2/2021 10:53 AM by Dr. Apollo Ceron.       XR Chest 1 View [[id number]] Collected: 02/01/21 0829     Updated: 02/01/21 1641    Narrative:      EXAMINATION: XR CHEST 1 VW-02/01/2021:      INDICATION: F/U respiratory illness; K85.90-Acute pancreatitis without  necrosis or infection, unspecified.      COMPARISON: NONE.     FINDINGS: Portable chest reveals the heart to be enlarged. The  endotracheal tube has been removed. The remainder of the lines and tubes  are in satisfactory position. Increased markings seen diffusely  throughout the lung fields. Degenerative changes seen within the spine.  No pleural effusion or pneumothorax. Increased pulmonary  vascularity  bilaterally.       Impression:      Removal of the endotracheal tube. There is slight  improvement seen of the aeration of the lung fields in the interval.  Lung volumes remain low.     D:  02/01/2021  E:  02/01/2021     This report was finalized on 2/1/2021 4:38 PM by Dr. Jackelin Pacheco MD.             Results: Reviewed.  I reviewed the patient's new laboratory and imaging results.  I independently reviewed the patient's new images.    Medications: Reviewed.    Assessment/Plan   A / P     Mr. Bro is a 45yo M with a history of ETOH abuse who was admitted on 1/19/21 with pancreatitis. He was admitted to Hospital Medicine but developed ETOH withdrawal necessitating transfer to the ICU on 1/22/21. He was placed on a Precedex drip and given IV Valium. He continued to require intermittent doses of Ativan despite this. He ultimately required intubation with mechanical ventilation on 1/24/21. A repeat CT of the abdomen/pelvis was performed on 1/27/21 due to worsening fevers and showed some possible necrosis in the head of the pancreas and worsening peripancreatic fluid collections. GI is following. He was extubated on 1/31/21. Respiratory status remains marginal this AM.     Nutrition:   NPO Diet  Advance Directives:   Code Status and Medical Interventions:   Ordered at: 01/19/21 1408     Level Of Support Discussed With:    Patient     Code Status:    CPR     Medical Interventions (Level of Support Prior to Arrest):    Full       Active Hospital Problems    Diagnosis   • **Acute pancreatitis. Alcoholic +/- hyperlipidemia induced   • Alcohol withdrawal    • Hypertriglyceridemia   • Fatty liver   • Acute hypoxemic respiratory failure requiring NIV. ICU transfer 1/22/2021 (CMS/[facility])   • Fever and increased procalcitonin. ? source   • Class 3 severe obesity in adult    • LIANA   • Alcohol abuse   • Essential hypertension   • GERD without esophagitis       Assessment / Plan:    1. Continue to monitor in the  ICU. High risk for reintubation.  2. NT suctioning as needed.   3. Continue scheduled valium. Wean Precedex.   4. Continue tube feeds.    5. Continue Meropenem and Micafungin.   6. Continue scheduled Seroquel.  7. Continue thiamine  8. AM labs    High risk for clinical decline.     High level of risk due to:  severe exacerbation of chronic illness and illness with threat to life or bodily function.    Plan of care and goals reviewed during interdisciplinary rounds.  I discussed the patient's findings and my recommendations with patient and nursing staff      Jeanie Page DO    Intensive Care Medicine and Pulmonary Medicine       Electronically signed by Jeanie Page DO at 02/02/21 1101       Consult Notes (last 24 hours) (Notes from 02/02/21 0931 through 02/03/21 0931)    No notes of this type exist for this encounter.

## 2021-02-03 NOTE — PROGRESS NOTES
Continued Stay Note  [facility]     Patient Name: Titus Bro  MRN: [id number]  Today's Date: 2/3/2021    Admit Date: 1/19/2021    Discharge Plan     Row Name 02/03/21 1113       Plan    Plan  Ongoing    Plan Comments  Patient currentl on BiPAP and sleepy.  Patient had period of agitation and was combative last hs, currently in restraints.  Discussed in MDR.  Discharge plan still evolving.  Will need PT/OT evals when patient appropriate.  CM will continue to follow.        Discharge Codes    No documentation.       Expected Discharge Date and Time     Expected Discharge Date Expected Discharge Time    Feb 8, 2021             Krystal Salmeron RN

## 2021-02-03 NOTE — PROGRESS NOTES
"GI Daily Progress Note  Subjective     Titus Bro is a 46 y.o. male who was admitted with Acute pancreatitis.   Restless last night.  Now sedated.  Needed BM and did not want to use bedpain.  Temp 102.4 at MN  Chief Complaint:  pancreatitis    Objective     /72   Pulse 71   Temp 99.3 °F (37.4 °C) (Bladder)   Resp (!) 31   Ht 188 cm (74.02\")   Wt (!) 206 kg (454 lb 14.4 oz) Comment: per bed scale   SpO2 97%   BMI 58.38 kg/m²     Intake/Output last 3 shifts:  I/O last 3 completed shifts:  In: 5778.8 [I.V.:2504.8; Other:870; NG/GT:2004; IV Piggyback:400]  Out: 2825 [Urine:2475; Emesis/NG output:350]  Intake/Output this shift:  No intake/output data recorded.      Physical Exam  Wt Readings from Last 3 Encounters:   02/01/21 (!) 206 kg (454 lb 14.4 oz)   06/10/20 (!) 152 kg (335 lb)   08/07/19 (!) 144 kg (318 lb)   ,body mass index is 58.38 kg/m².,@FLOWAMB(6)@,@FLOWAMB(5)@,@FLOWAMB(8)@   CONSTITUTIONAL:sedated  Resp CTA; no rhonchi, rales, or wheezes.  Respiration effort normal  CV RRR; no M/R/G. No lower extremity edema, obese  GI Abd soft, distened,  Decreased bowel sounds..  Tympanitic    Psych:     DATA:    Assessment/Plan     1. Severe acute alcoholic pancreatitis with necrosis.  2. Systemic inflammatory response syndrome with fever.  3. Intermittent ileus secondary to abdominal inflammation from pancreatitis, aggravated by opiates.  4. Morbid obesity.    Will give trial of Mag citrate  If no results will need KUB vs repeat CT abd/pelvis  Continue TF        Acute pancreatitis. Alcoholic +/- hyperlipidemia induced    Alcohol abuse    Essential hypertension    GERD without esophagitis    Class 3 severe obesity in adult     LIANA    Alcohol withdrawal     Hypertriglyceridemia    Fatty liver    Acute hypoxemic respiratory failure requiring NIV. ICU transfer 1/22/2021 (CMS/HCC)    Fever and increased procalcitonin. ? source       LOS: 15 days     Vipin Holliday MD  02/03/21  08:49 EST  "

## 2021-02-03 NOTE — PROGRESS NOTES
INTENSIVIST   PROGRESS NOTE     Hospital:  LOS: 15 days     Mr. Titus Bro, 46 y.o. male is followed for a Chief Complaint of: Encephalopathy      Subjective   S     Interval History:  Agitated overnight and required increased Seroquel, Ativan, Valium and Geodon. Calm this AM.        The patient's relevant past medical, surgical and social history were reviewed and updated in Epic as appropriate.      ROS: Unable to obtain secondary to mental status.     Objective   O     Vitals:  Temp  Min: 99.3 °F (37.4 °C)  Max: 102.4 °F (39.1 °C)  BP  Min: 111/69  Max: 170/115  Pulse  Min: 71  Max: 147  Resp  Min: 20  Max: 40  SpO2  Min: 87 %  Max: 100 % Flow (L/min)  Min: 2  Max: 2    Intake/Ouptut 24 hrs (7:00AM - 6:59 AM)  Intake & Output (last 3 days)       01/31 0701 - 02/01 0700 02/01 0701 - 02/02 0700 02/02 0701 - 02/03 0700 02/03 0701 - 02/04 0700    I.V. (mL/kg) 1372.7 (6.7) 1707.6 (8.3) 897.2 (4.4)     Other 388 541 630     NG/ 1250 1451     IV Piggyback 286 200 300     Total Intake(mL/kg) 2672.7 (13) 3698.6 (18) 3278.2 (15.9)     Urine (mL/kg/hr) 4425 (0.9) 2045 (0.4) 1725 (0.3)     Emesis/NG output 720 520 200     Stool 0       Total Output 5145 2565 1925     Net -2472.3 +1133.6 +1353.2             Stool Unmeasured Occurrence 1 x             Medications (drips):  dexmedetomidine, Last Rate: 1.5 mcg/kg/hr (02/03/21 0436)          Physical Examination  Telemetry:  Normal sinus rhythm.    Constitutional:  No acute distress.  Resting in bed on Bipap.    Eyes: No scleral icterus.   PERRL, EOM intact.    Neck:  Supple, FROM   Cardiovascular: Normal rate, regular and rhythm. Normal heart sounds.  No murmurs, gallop or rub.   Respiratory: No respiratory distress. Normal respiratory effort.  Coarse rhonchi bilaterally.    Abdominal:  Soft. No masses. Nontender. No distension. No HSM.   Extremities: No digital cyanosis. No clubbing.  No peripheral edema.   Skin: No rashes, lesions or ulcers   Neurological:   Alert and  oriented to person and place. Mumbling incoherently.               Results from last 7 days   Lab Units 02/03/21  0352 02/02/21  0751 02/01/21  0339   WBC 10*3/mm3 10.57 12.18* 11.38*   HEMOGLOBIN g/dL 10.5* 10.3* 10.7*   MCV fL 88.4 87.7 90.7   PLATELETS 10*3/mm3 586* 502* 396     Results from last 7 days   Lab Units 02/03/21  0352 02/02/21  0751 02/01/21  0339   SODIUM mmol/L 146* 146* 147*   POTASSIUM mmol/L 4.2 4.0 4.1   CO2 mmol/L 32.0* 30.0* 30.0*   CREATININE mg/dL 0.62* 0.60* 0.69*   GLUCOSE mg/dL 115* 136* 124*   MAGNESIUM mg/dL 1.9 1.7 1.7   PHOSPHORUS mg/dL 4.3 4.2 3.5     Estimated Creatinine Clearance: 278 mL/min (A) (by C-G formula based on SCr of 0.62 mg/dL (L)).  Results from last 7 days   Lab Units 02/03/21  0352 02/01/21  0339   ALK PHOS U/L 73 83   BILIRUBIN mg/dL 0.3 0.3   ALT (SGPT) U/L 21 22   AST (SGOT) U/L 38 60*       Results from last 7 days   Lab Units 02/01/21  0140   PH, ARTERIAL pH units 7.422   PCO2, ARTERIAL mm Hg 49.4*   PO2 ART mm Hg 79.9*   FIO2 % 33       Images:    Imaging Results (Last 24 Hours)     ** No results found for the last 24 hours. **          Results: Reviewed.  I reviewed the patient's new laboratory and imaging results.  I independently reviewed the patient's new images.    Medications: Reviewed.    Assessment/Plan   A / P     Mr. Bro is a 45yo M with a history of ETOH abuse who was admitted on 1/19/21 with pancreatitis. He was admitted to Hospital Medicine but developed ETOH withdrawal necessitating transfer to the ICU on 1/22/21. He was placed on a Precedex drip and given IV Valium. He continued to require intermittent doses of Ativan despite this. He ultimately required intubation with mechanical ventilation on 1/24/21. A repeat CT of the abdomen/pelvis was performed on 1/27/21 due to worsening fevers and showed some possible necrosis in the head of the pancreas and worsening peripancreatic fluid collections. GI is following. He was extubated on 1/31/21.  Respiratory status remains marginal on/off Bipap. He continues to have periods of extreme agitation.      Nutrition:   NPO Diet  Advance Directives:   Code Status and Medical Interventions:   Ordered at: 01/19/21 140     Level Of Support Discussed With:    Patient     Code Status:    CPR     Medical Interventions (Level of Support Prior to Arrest):    Full       Active Hospital Problems    Diagnosis   • **Acute pancreatitis. Alcoholic +/- hyperlipidemia induced   • Alcohol withdrawal    • Hypertriglyceridemia   • Fatty liver   • Acute hypoxemic respiratory failure requiring NIV. ICU transfer 1/22/2021 (CMS/[facility])   • Fever and increased procalcitonin. ? source   • Class 3 severe obesity in adult    • LIANA   • Alcohol abuse   • Essential hypertension   • GERD without esophagitis       Assessment / Plan:    Continue to monitor in the ICU. High risk for reintubation.  NT suctioning as needed.   Continue scheduled valium and increased dose of Seroquel. Wean Precedex.   Continue tube feeds. Increase free water.   Continue Meropenem and Micafungin.   Continue thiamine  Replace magnesium  Bowel regimen  AM labs    High risk for clinical decline.     High level of risk due to:  severe exacerbation of chronic illness and illness with threat to life or bodily function.    Plan of care and goals reviewed during interdisciplinary rounds.  I discussed the patient's findings and my recommendations with patient and nursing staff      Jeanie Page, DO    Intensive Care Medicine and Pulmonary Medicine

## 2021-02-03 NOTE — SIGNIFICANT NOTE
Pt stated that he need to void and have a BM. We turned pt and placed him on a bedpan and informed him that he has a f/c. Pt started to become very agitated, demanding to get OOB. Pt began becoming combative, yelling and thrashing head. Pt's HR quickly reached 140's-150's. Pt thrashing his head so much that his O2 would not stay in his nose, O2 sat in the 80's. The more time went on the more confused the pt became, calling for family members that were not there and saying things that did not make sense. Unable to orient pt nor were we able to calm him down. Temp increased from 38.5 to 39.1 quickly from pt thrashing in bed. Pt sweating profusely. PRN ativan had been given shortly before this episode. Scheduled IV Valium given early d/t pt's agitation, neither of these helped. ICU APRN notified, Valium 5mg IV x1 as well as PO Seroquel 50mg ordered, both given and pt was still extremely agitated. ICU APRN notified again and Geodon 20mg IM x1 ordered and given. Maxed on Precedex. Finally, pt went to sleep and RN was able to apply Bipap. Pt now resting, tolerating Bipap. VSS. Will continue to monitor.

## 2021-02-03 NOTE — PROGRESS NOTES
Multidisciplinary Rounds    Time: 20min  Patient Name: Titus Bro  Date of Encounter: 02/03/21 09:19 EST  MRN: [id number]  Admission date: 1/19/2021      Reason for visit: MDR. RD to continue to follow per protocol.     Additional information obtained during MDR: 70% of EN goal volume delivered over the past 24 hrs. EN has been on hold overnight 2/2 pt complaining of needing a bowel movement. GI planning on giving pt mag citrate. OK per GI and intensivist to restart EN at goal rate at this time. Will increase free water up to 50 ml/hr secondary to continued hypernatremia.     Per I&O over the past 24 hrs:  NGT output= 200 ml  Last bowel movement documented (1/31-2/1)    Current diet: NPO Diet    EN: Peptamen Intense VHP at 100 ml/hr and free water at 30 ml/hr via NDT    Intervention:  Follow treatment plan  Care plan reviewed    Follow up:   Per protocol      Lynette Silver MS RD/[facility]  09:19 EST

## 2021-02-03 NOTE — PLAN OF CARE
Problem: Adult Inpatient Plan of Care  Goal: Plan of Care Review  Outcome: Ongoing, Progressing  Flowsheets (Taken 2/3/2021 6423)  Outcome Summary: Pt has been very agitated this shift. Pt has been yelling, agitated, combative. After multiple meds given, pt is finally resting with Bipap on. TF held due to pt complaining of needing to have a BM and abdominal pain, which is what started his agitation when he couldn't get OOB. Tylenol given for fever, helped. Unable to NT suction pt d/t extreme agitation. VSS. UOP adequate. Will continue to monitor.   Goal Outcome Evaluation:        Outcome Summary: Pt has been very agitated this shift. Pt has been yelling, agitated, combative. After multiple meds given, pt is finally resting with Bipap on. TF held due to pt complaining of needing to have a BM and abdominal pain, which is what started his agitation when he couldn't get OOB. Tylenol given for fever, helped. Unable to NT suction pt d/t extreme agitation. VSS. UOP adequate. Will continue to monitor.

## 2021-02-03 NOTE — PLAN OF CARE
Goal Outcome Evaluation:  Plan of Care Reviewed With: family  Progress: no change     Pt remains confused with garbled speech. Minimal agitation today. 1400 valium dose held and decreased precedex to 1mcg/hr. Mag Citrate given this am. Pt had large soft BM this afternoon. KUB done. 750ml out of NGT today. Post pyloric feeding continue. 40mg lasix today with 2750ml urine out. Continues to have low grade fever on and off. Daughter Shira updated.

## 2021-02-04 LAB
ANION GAP SERPL CALCULATED.3IONS-SCNC: 8 MMOL/L (ref 5–15)
BASOPHILS # BLD AUTO: 0.06 10*3/MM3 (ref 0–0.2)
BASOPHILS NFR BLD AUTO: 0.7 % (ref 0–1.5)
BUN SERPL-MCNC: 16 MG/DL (ref 6–20)
BUN/CREAT SERPL: 24.2 (ref 7–25)
CALCIUM SPEC-SCNC: 8.9 MG/DL (ref 8.6–10.5)
CHLORIDE SERPL-SCNC: 103 MMOL/L (ref 98–107)
CO2 SERPL-SCNC: 33 MMOL/L (ref 22–29)
CREAT SERPL-MCNC: 0.66 MG/DL (ref 0.76–1.27)
DEPRECATED RDW RBC AUTO: 48.5 FL (ref 37–54)
EOSINOPHIL # BLD AUTO: 0.13 10*3/MM3 (ref 0–0.4)
EOSINOPHIL NFR BLD AUTO: 1.4 % (ref 0.3–6.2)
ERYTHROCYTE [DISTWIDTH] IN BLOOD BY AUTOMATED COUNT: 14.6 % (ref 12.3–15.4)
GFR SERPL CREATININE-BSD FRML MDRD: >150 ML/MIN/1.73
GLUCOSE BLDC GLUCOMTR-MCNC: 104 MG/DL (ref 70–130)
GLUCOSE BLDC GLUCOMTR-MCNC: 115 MG/DL (ref 70–130)
GLUCOSE BLDC GLUCOMTR-MCNC: 124 MG/DL (ref 70–130)
GLUCOSE BLDC GLUCOMTR-MCNC: 136 MG/DL (ref 70–130)
GLUCOSE SERPL-MCNC: 129 MG/DL (ref 65–99)
HCT VFR BLD AUTO: 35.8 % (ref 37.5–51)
HGB BLD-MCNC: 10.6 G/DL (ref 13–17.7)
IMM GRANULOCYTES # BLD AUTO: 0.04 10*3/MM3 (ref 0–0.05)
IMM GRANULOCYTES NFR BLD AUTO: 0.4 % (ref 0–0.5)
LYMPHOCYTES # BLD AUTO: 1.97 10*3/MM3 (ref 0.7–3.1)
LYMPHOCYTES NFR BLD AUTO: 21.9 % (ref 19.6–45.3)
MAGNESIUM SERPL-MCNC: 2.1 MG/DL (ref 1.6–2.6)
MCH RBC QN AUTO: 26.8 PG (ref 26.6–33)
MCHC RBC AUTO-ENTMCNC: 29.6 G/DL (ref 31.5–35.7)
MCV RBC AUTO: 90.4 FL (ref 79–97)
MONOCYTES # BLD AUTO: 0.7 10*3/MM3 (ref 0.1–0.9)
MONOCYTES NFR BLD AUTO: 7.8 % (ref 5–12)
NEUTROPHILS NFR BLD AUTO: 6.1 10*3/MM3 (ref 1.7–7)
NEUTROPHILS NFR BLD AUTO: 67.8 % (ref 42.7–76)
NRBC BLD AUTO-RTO: 0 /100 WBC (ref 0–0.2)
PHOSPHATE SERPL-MCNC: 3.5 MG/DL (ref 2.5–4.5)
PLATELET # BLD AUTO: 657 10*3/MM3 (ref 140–450)
PMV BLD AUTO: 11.3 FL (ref 6–12)
POTASSIUM SERPL-SCNC: 3.8 MMOL/L (ref 3.5–5.2)
RBC # BLD AUTO: 3.96 10*6/MM3 (ref 4.14–5.8)
SODIUM SERPL-SCNC: 144 MMOL/L (ref 136–145)
WBC # BLD AUTO: 9 10*3/MM3 (ref 3.4–10.8)

## 2021-02-04 PROCEDURE — 25010000002 METHYLNALTREXONE 12 MG/0.6ML SOLUTION: Performed by: INTERNAL MEDICINE

## 2021-02-04 PROCEDURE — 94660 CPAP INITIATION&MGMT: CPT

## 2021-02-04 PROCEDURE — 94799 UNLISTED PULMONARY SVC/PX: CPT

## 2021-02-04 PROCEDURE — 25010000002 ENOXAPARIN PER 10 MG: Performed by: FAMILY MEDICINE

## 2021-02-04 PROCEDURE — 25010000002 MEROPENEM PER 100 MG: Performed by: INTERNAL MEDICINE

## 2021-02-04 PROCEDURE — 25010000002 FUROSEMIDE PER 20 MG: Performed by: INTERNAL MEDICINE

## 2021-02-04 PROCEDURE — 83735 ASSAY OF MAGNESIUM: CPT | Performed by: INTERNAL MEDICINE

## 2021-02-04 PROCEDURE — 25010000002 LORAZEPAM PER 2 MG: Performed by: INTERNAL MEDICINE

## 2021-02-04 PROCEDURE — 80048 BASIC METABOLIC PNL TOTAL CA: CPT | Performed by: INTERNAL MEDICINE

## 2021-02-04 PROCEDURE — 99233 SBSQ HOSP IP/OBS HIGH 50: CPT | Performed by: INTERNAL MEDICINE

## 2021-02-04 PROCEDURE — 25010000002 DIAZEPAM PER 5 MG: Performed by: INTERNAL MEDICINE

## 2021-02-04 PROCEDURE — 85025 COMPLETE CBC W/AUTO DIFF WBC: CPT | Performed by: INTERNAL MEDICINE

## 2021-02-04 PROCEDURE — 82962 GLUCOSE BLOOD TEST: CPT

## 2021-02-04 PROCEDURE — 84100 ASSAY OF PHOSPHORUS: CPT | Performed by: INTERNAL MEDICINE

## 2021-02-04 PROCEDURE — 94640 AIRWAY INHALATION TREATMENT: CPT

## 2021-02-04 PROCEDURE — 97161 PT EVAL LOW COMPLEX 20 MIN: CPT | Performed by: PHYSICAL THERAPIST

## 2021-02-04 RX ORDER — OXYCODONE HYDROCHLORIDE 5 MG/1
10 TABLET ORAL EVERY 6 HOURS
Status: DISCONTINUED | OUTPATIENT
Start: 2021-02-04 | End: 2021-02-08

## 2021-02-04 RX ORDER — FUROSEMIDE 10 MG/ML
40 INJECTION INTRAMUSCULAR; INTRAVENOUS ONCE
Status: COMPLETED | OUTPATIENT
Start: 2021-02-04 | End: 2021-02-04

## 2021-02-04 RX ADMIN — OXYCODONE HYDROCHLORIDE 15 MG: 15 TABLET ORAL at 05:42

## 2021-02-04 RX ADMIN — DIAZEPAM 5 MG: 5 INJECTION, SOLUTION INTRAMUSCULAR; INTRAVENOUS at 08:56

## 2021-02-04 RX ADMIN — SODIUM CHLORIDE, PRESERVATIVE FREE 10 ML: 5 INJECTION INTRAVENOUS at 20:03

## 2021-02-04 RX ADMIN — ENOXAPARIN SODIUM 40 MG: 40 INJECTION SUBCUTANEOUS at 08:52

## 2021-02-04 RX ADMIN — DEXMEDETOMIDINE 1 MCG/KG/HR: 100 INJECTION, SOLUTION, CONCENTRATE INTRAVENOUS at 14:19

## 2021-02-04 RX ADMIN — THIAMINE HCL TAB 100 MG 100 MG: 100 TAB at 09:02

## 2021-02-04 RX ADMIN — OXYCODONE HYDROCHLORIDE 10 MG: 5 TABLET ORAL at 17:06

## 2021-02-04 RX ADMIN — MEROPENEM 2 G: 1 INJECTION, POWDER, FOR SOLUTION INTRAVENOUS at 23:27

## 2021-02-04 RX ADMIN — QUETIAPINE FUMARATE 100 MG: 100 TABLET ORAL at 13:09

## 2021-02-04 RX ADMIN — OXYCODONE HYDROCHLORIDE 10 MG: 5 TABLET ORAL at 11:36

## 2021-02-04 RX ADMIN — DIAZEPAM 5 MG: 5 INJECTION, SOLUTION INTRAMUSCULAR; INTRAVENOUS at 20:01

## 2021-02-04 RX ADMIN — METOPROLOL TARTRATE 25 MG: 25 TABLET, FILM COATED ORAL at 20:03

## 2021-02-04 RX ADMIN — PANTOPRAZOLE SODIUM 40 MG: 40 INJECTION, POWDER, LYOPHILIZED, FOR SOLUTION INTRAVENOUS at 05:42

## 2021-02-04 RX ADMIN — IPRATROPIUM BROMIDE AND ALBUTEROL SULFATE 3 ML: 2.5; .5 SOLUTION RESPIRATORY (INHALATION) at 22:55

## 2021-02-04 RX ADMIN — ENOXAPARIN SODIUM 40 MG: 40 INJECTION SUBCUTANEOUS at 20:03

## 2021-02-04 RX ADMIN — IPRATROPIUM BROMIDE AND ALBUTEROL SULFATE 3 ML: 2.5; .5 SOLUTION RESPIRATORY (INHALATION) at 07:08

## 2021-02-04 RX ADMIN — CLONIDINE HYDROCHLORIDE 0.1 MG: 0.1 TABLET ORAL at 20:03

## 2021-02-04 RX ADMIN — IPRATROPIUM BROMIDE AND ALBUTEROL SULFATE 3 ML: 2.5; .5 SOLUTION RESPIRATORY (INHALATION) at 15:50

## 2021-02-04 RX ADMIN — DEXMEDETOMIDINE 1.1 MCG/KG/HR: 100 INJECTION, SOLUTION, CONCENTRATE INTRAVENOUS at 07:06

## 2021-02-04 RX ADMIN — SODIUM CHLORIDE, PRESERVATIVE FREE 10 ML: 5 INJECTION INTRAVENOUS at 13:04

## 2021-02-04 RX ADMIN — ESCITALOPRAM OXALATE 20 MG: 20 TABLET ORAL at 09:02

## 2021-02-04 RX ADMIN — LORAZEPAM 2 MG: 2 INJECTION INTRAMUSCULAR; INTRAVENOUS at 20:58

## 2021-02-04 RX ADMIN — METOPROLOL TARTRATE 25 MG: 25 TABLET, FILM COATED ORAL at 09:02

## 2021-02-04 RX ADMIN — DIAZEPAM 5 MG: 5 INJECTION, SOLUTION INTRAMUSCULAR; INTRAVENOUS at 02:00

## 2021-02-04 RX ADMIN — OXYCODONE HYDROCHLORIDE 10 MG: 5 TABLET ORAL at 23:27

## 2021-02-04 RX ADMIN — QUETIAPINE FUMARATE 100 MG: 100 TABLET ORAL at 05:42

## 2021-02-04 RX ADMIN — MEROPENEM 2 G: 1 INJECTION, POWDER, FOR SOLUTION INTRAVENOUS at 08:51

## 2021-02-04 RX ADMIN — IPRATROPIUM BROMIDE AND ALBUTEROL SULFATE 3 ML: 2.5; .5 SOLUTION RESPIRATORY (INHALATION) at 19:17

## 2021-02-04 RX ADMIN — LORAZEPAM 2 MG: 2 INJECTION INTRAMUSCULAR; INTRAVENOUS at 03:09

## 2021-02-04 RX ADMIN — FUROSEMIDE 40 MG: 10 INJECTION INTRAMUSCULAR; INTRAVENOUS at 08:53

## 2021-02-04 RX ADMIN — METHYLNALTREXONE BROMIDE 12 MG: 12 INJECTION, SOLUTION SUBCUTANEOUS at 09:00

## 2021-02-04 RX ADMIN — DIAZEPAM 5 MG: 5 INJECTION, SOLUTION INTRAMUSCULAR; INTRAVENOUS at 13:02

## 2021-02-04 RX ADMIN — IPRATROPIUM BROMIDE AND ALBUTEROL SULFATE 3 ML: 2.5; .5 SOLUTION RESPIRATORY (INHALATION) at 02:37

## 2021-02-04 RX ADMIN — BISACODYL 10 MG: 10 SUPPOSITORY RECTAL at 09:02

## 2021-02-04 RX ADMIN — LORAZEPAM 2 MG: 2 INJECTION INTRAMUSCULAR; INTRAVENOUS at 23:06

## 2021-02-04 RX ADMIN — CLONIDINE HYDROCHLORIDE 0.1 MG: 0.1 TABLET ORAL at 09:02

## 2021-02-04 RX ADMIN — SENNOSIDES 10 ML: 8.8 LIQUID ORAL at 20:03

## 2021-02-04 RX ADMIN — FOLIC ACID 1 MG: 1 TABLET ORAL at 09:02

## 2021-02-04 RX ADMIN — QUETIAPINE FUMARATE 100 MG: 100 TABLET ORAL at 21:00

## 2021-02-04 RX ADMIN — DEXMEDETOMIDINE 1.2 MCG/KG/HR: 100 INJECTION, SOLUTION, CONCENTRATE INTRAVENOUS at 01:34

## 2021-02-04 RX ADMIN — DEXMEDETOMIDINE 1.3 MCG/KG/HR: 100 INJECTION, SOLUTION, CONCENTRATE INTRAVENOUS at 19:34

## 2021-02-04 RX ADMIN — MEROPENEM 2 G: 1 INJECTION, POWDER, FOR SOLUTION INTRAVENOUS at 15:10

## 2021-02-04 NOTE — PROGRESS NOTES
Clinical Nutrition   Reason For Visit: MDR, Follow-up protocol, EN    Patient Name: Titus Bro  YOB: 1975  MRN: [id number]  Date of Encounter: 02/04/21 11:27 EST  Admission date: 1/19/2021      Nutrition Assessment     Admission Problem List:  Acute pancreatitis. Alcoholic +/- hyperlipidemia induced  Alcohol abuse  LIANA, improved  Alcohol withdrawal   Hypertriglyceridemia  Fatty liver  Acute hypoxemic respiratory failure requiring NIV. ICU transfer 1/22/2021  Vent (1/24) --> extubated (1/31)  Fevers  Ileus  Hypophosphatemia, resolved  Per CT abdomen/pelvis (1/27)- pancreatic head- acute necrotic pancreatitis   KUB (2/3)- demonstrating diffuse segments of gas-filled and dilated small and large bowel compatible with obstruction or ileus, at most minimally improved from recent comparison.       Applicable PMH:  HTN  GERD  COPD  Seizure 2/2 EtOH withdrawal      Applicable Nutrition-Related Information:  (1/24) EN ordered per intensivist- Peptamen Intense VHP at 75 ml/hr and free water at 55 ml/hr via NGT  (1/25) EN rate decreased per RD 2/2 hypophosphatemia- VHP at 20 ml/hr  (1/26) EN rate increased per RD- 100 ml/hr  (1/27) Pt had residual of 500 ml this AM, EN held. NDT placed and EN restarted via NDT  (1/28) Pt tolerating EN via NDT  (1/30) free water decreased to 30 ml/hr per intensivist  (2/1) Per previous RN notes, pt had 1150 ml out NGT on (1/29), EN was then held ~1800. EN restarted at trophic rate on (1/30). EN held on (1/31) and then restarted later that day at 25 ml/hr. EN currently at 60 ml/hr and pt tolerating, advancing slowly to goal rate as pt tolerates.   (2/3) EN has been on hold overnight 2/2 pt complaining of needing a bowel movement. GI planning on giving pt mag citrate. OK per GI and intensivist to restart EN at goal rate at this time. Will increase free water up to 50 ml/hr secondary to continued hypernatremia.        Reported/Observed/Food/Nutrition Related History     Pt received  mag citrate yesterday (2/3) and had a bowel movement.     Per I&O over the past 24 hrs:  NGT output= 900 ml  1 bowel movement      Anthropometrics   Height: 74 in  Weight: 327 lb (1/22), no method documented  BMI: 42.0  BMI classification: Obese Class III extreme obesity: > or equal to 40kg/m2   IBW: 190 lb    Date Weight (kg) Weight (lbs) Weight Method   2/1/2021 206.341 kg  454 lb 14.4 oz  Bed scale   1/22/2021 148.326 kg 327 lb -   1/22/2021 148.4 kg 327 lb 2.6 oz -   1/19/2021 151.955 kg 335 lb -   1/19/2021 151.955 kg 335 lb Stated   6/10/2020 151.955 kg 335 lb -   8/7/2019 144.244 kg 318 lb Stated   7/17/2017 129.275 kg 285 lb Stated       Labs reviewed   Labs reviewed: Yes    Results from last 7 days   Lab Units 02/04/21 0336 02/03/21 0352 02/02/21 0751 02/01/21 0339   SODIUM mmol/L 144 146* 146* 147*   POTASSIUM mmol/L 3.8 4.2 4.0 4.1   CHLORIDE mmol/L 103 106 105 106   CO2 mmol/L 33.0* 32.0* 30.0* 30.0*   BUN mg/dL 16 14 13 16   CREATININE mg/dL 0.66* 0.62* 0.60* 0.69*   GLUCOSE mg/dL 129* 115* 136* 124*   CALCIUM mg/dL 8.9 9.1 8.9 9.1   PHOSPHORUS mg/dL 3.5 4.3 4.2 3.5   MAGNESIUM mg/dL 2.1 1.9 1.7 1.7   CHOLESTEROL mg/dL  --   --   --  108   TRIGLYCERIDES mg/dL  --   --   --  411*     Results from last 7 days   Lab Units 02/04/21 0336 02/03/21 0352 02/02/21 0751 02/01/21 0339 01/31/21  0636   WBC 10*3/mm3 9.00 10.57 12.18* 11.38* 12.29*   ALBUMIN g/dL  --  2.50*  --  2.40* 2.10*   CHOLESTEROL mg/dL  --   --   --  108  --    TRIGLYCERIDES mg/dL  --   --   --  411*  --      Results from last 7 days   Lab Units 02/04/21  0525 02/03/21  2332 02/03/21  1727 02/03/21  1142 02/03/21  0511 02/02/21  2311   GLUCOSE mg/dL 136* 123 100 114 114 115     No results found for: HGBA1C  Medications reviewed   Medications reviewed: Yes  Pertinent: dulcolax, valium, folic acid, lasix, antibiotic, relistor, oxycodone, protonix, seroquel, senokot, thiamin  GTT: precedex  PRN: tylenol, ativan    Lactulose given (1/25  and 1/26)  Mag citrate given (2/3)      Needs Assessment  (2/1)   Height used: 74 in/188 cm  Weight used: 327 lb/148 kg  IBW: 190 lb/86 kg    Estimated Calories needs: ~2000 kcal/day  14 kcal/kg actual weight= 2072 kcal    Estimated Protein needs:   2.0-2.5 g/kg IBW= 172-215 g pro      Current Nutrition Prescription   PO: NPO Diet       EN: Peptamen Intense VHP   Diet, Tube Feeding Tube Feeding Formula: Peptamen Intense VHP (Peptide Based, Very High Protein) at 100 ml/hr (goal volume= 2000 ml/day) and free water at 50 ml/hr  Route: ND  Verified at bedside: Yes   Provides at goal volume: 2000 kcal, 184 g pro, 8 g fiber, 1680 ml water from EN, 2680 ml water total      Evaluation of Received Nutrient/Fluid Intake-EN:  1 Day:   1472 ml, 74%      Nutrition Diagnosis   1/25  Problem Less than optimal enteral nutrition regimen    Etiology Clinical status/lab values/needs assessment    Signs/Symptoms Hypophosphatemia     resolved      1/22  Problem Inadequate energy intake, inadequate protein intake   Etiology Clinical status   Signs/Symptoms NPO/Clear liquids x 72hrs     Status: EN started (1/24)      Intervention   Intervention: Follow treatment progress, Care plan reviewed   -Will continue with current EN order at this time  -Will continue to monitor Na+ and adjust free water as needed    -Will continue to follow and adjust nutrition support regimen as medically appropriate      Goal:   General: Nutrition support treatment  PO: N/A   EN/PN: Maintain EN, Tolerate EN at goal      Monitoring/Evaluation:   Monitoring/Evaluation: Per protocol, I&O, Pertinent labs, EN delivery/tolerance, Weight, GI status, Symptoms    Lynette Silver, MS RD/LD CNSC  Time Spent: 45 minutes

## 2021-02-04 NOTE — PROGRESS NOTES
INTENSIVIST   PROGRESS NOTE     Hospital:  LOS: 16 days     Mr. Titus Bro, 46 y.o. male is followed for a Chief Complaint of: Encephalopathy      Subjective   S     Interval History:  No acute events overnight. Remains intermittently confused.        The patient's relevant past medical, surgical and social history were reviewed and updated in Epic as appropriate.      ROS: Unable to obtain secondary to mental status.     Objective   O     Vitals:  Temp  Min: 98.6 °F (37 °C)  Max: 100.6 °F (38.1 °C)  BP  Min: 112/70  Max: 157/98  Pulse  Min: 69  Max: 101  Resp  Min: 24  Max: 36  SpO2  Min: 93 %  Max: 100 % Flow (L/min)  Min: 2  Max: 2    Intake/Ouptut 24 hrs (7:00AM - 6:59 AM)  Intake & Output (last 3 days)       02/01 0701 - 02/02 0700 02/02 0701 - 02/03 0700 02/03 0701 - 02/04 0700 02/04 0701 - 02/05 0700    I.V. (mL/kg) 1707.6 (8.3) 897.2 (4.4) 2176 (10.6) 342 (1.7)    Other 541 630 772 356    NG/GT 1250 1451 1472 652    IV Piggyback 200 300 100 100    Total Intake(mL/kg) 3698.6 (18) 3278.2 (15.9) 4520 (21.9) 1450 (7)    Urine (mL/kg/hr) 2045 (0.4) 1725 (0.3) 3405 (0.7) 2350 (1.9)    Emesis/NG output 520 200 900     Stool   0 0    Total Output 2565 1925 4305 2350    Net +1133.6 +1353.2 +215 -900            Stool Unmeasured Occurrence   1 x 1 x          Medications (drips):  dexmedetomidine, Last Rate: 0.9 mcg/kg/hr (02/04/21 0902)          Physical Examination  Telemetry:  Normal sinus rhythm.    Constitutional:  No acute distress.  Resting in bed on 2L NC.    Eyes: No scleral icterus.   PERRL, EOM intact.    Neck:  Supple, FROM   Cardiovascular: Normal rate, regular and rhythm. Normal heart sounds.  No murmurs, gallop or rub.   Respiratory: No respiratory distress. Normal respiratory effort.  Coarse rhonchi bilaterally.    Abdominal:  Soft. No masses. Nontender. No distension. No HSM.   Extremities: No digital cyanosis. No clubbing.  1+ peripheral edema.   Skin: No rashes, lesions or ulcers   Neurological:    Alert and oriented to person and place.               Results from last 7 days   Lab Units 02/04/21  0336 02/03/21  0352 02/02/21  0751   WBC 10*3/mm3 9.00 10.57 12.18*   HEMOGLOBIN g/dL 10.6* 10.5* 10.3*   MCV fL 90.4 88.4 87.7   PLATELETS 10*3/mm3 657* 586* 502*     Results from last 7 days   Lab Units 02/04/21  0336 02/03/21  0352 02/02/21  0751   SODIUM mmol/L 144 146* 146*   POTASSIUM mmol/L 3.8 4.2 4.0   CO2 mmol/L 33.0* 32.0* 30.0*   CREATININE mg/dL 0.66* 0.62* 0.60*   GLUCOSE mg/dL 129* 115* 136*   MAGNESIUM mg/dL 2.1 1.9 1.7   PHOSPHORUS mg/dL 3.5 4.3 4.2     Estimated Creatinine Clearance: 261.1 mL/min (A) (by C-G formula based on SCr of 0.66 mg/dL (L)).  Results from last 7 days   Lab Units 02/03/21  0352 02/01/21  0339   ALK PHOS U/L 73 83   BILIRUBIN mg/dL 0.3 0.3   ALT (SGPT) U/L 21 22   AST (SGOT) U/L 38 60*       Results from last 7 days   Lab Units 02/01/21  0140   PH, ARTERIAL pH units 7.422   PCO2, ARTERIAL mm Hg 49.4*   PO2 ART mm Hg 79.9*   FIO2 % 33       Images:    Imaging Results (Last 24 Hours)     Procedure Component Value Units Date/Time    XR Abdomen KUB [[id number]] Collected: 02/03/21 1723     Updated: 02/03/21 1728    Narrative:      EXAMINATION: XR ABDOMEN KUB-      INDICATION: ileus; K85.90-Acute pancreatitis without necrosis or  infection, unspecified      COMPARISON: 1/30/2021     FINDINGS: An enteric tube terminates in the distal duodenum. Evaluation  is partially limited by patient body habitus, again demonstrating  diffuse segments of gas-filled and dilated small and large bowel  compatible with obstruction or ileus, at most minimally improved from  recent comparison.       Impression:      An enteric tube terminates in the distal duodenum.  Evaluation is partially limited by patient body habitus, again  demonstrating diffuse segments of gas-filled and dilated small and large  bowel compatible with obstruction or ileus, at most minimally improved  from recent comparison.      This report was finalized on 2/3/2021 5:25 PM by Shakir Hamm.             Results: Reviewed.  I reviewed the patient's new laboratory and imaging results.  I independently reviewed the patient's new images.    Medications: Reviewed.    Assessment/Plan   A / P     Mr. Bro is a 47yo M with a history of ETOH abuse who was admitted on 1/19/21 with pancreatitis. He was admitted to Hospital Medicine but developed ETOH withdrawal necessitating transfer to the ICU on 1/22/21. He was placed on a Precedex drip and given IV Valium. He continued to require intermittent doses of Ativan despite this. He ultimately required intubation with mechanical ventilation on 1/24/21. A repeat CT of the abdomen/pelvis was performed on 1/27/21 due to worsening fevers and showed some possible necrosis in the head of the pancreas and worsening peripancreatic fluid collections. GI is following. He was extubated on 1/31/21. Respiratory status remains marginal on/off Bipap. He continues to have periods of extreme agitation.      Nutrition:   NPO Diet  Advance Directives:   Code Status and Medical Interventions:   Ordered at: 01/19/21 1408     Level Of Support Discussed With:    Patient     Code Status:    CPR     Medical Interventions (Level of Support Prior to Arrest):    Full       Active Hospital Problems    Diagnosis   • **Acute pancreatitis. Alcoholic +/- hyperlipidemia induced   • Alcohol withdrawal    • Hypertriglyceridemia   • Fatty liver   • Acute hypoxemic respiratory failure requiring NIV. ICU transfer 1/22/2021 (CMS/[facility])   • Fever and increased procalcitonin. ? source   • Class 3 severe obesity in adult    • LIANA   • Alcohol abuse   • Essential hypertension   • GERD without esophagitis       Assessment / Plan:    Continue to monitor in the ICU. High risk for reintubation.  NT suctioning as needed.   Continue Seroquel. Wean Precedex.   Decrease Oxycodone and Valium.    Continue tube feeds.    Continue Meropenem. Completed a  course of Micafungin.  Continue thiamine  Replace magnesium  Bowel regimen  Lasix 40mg IV x 1  D/c accuchecks as no insulin coverage has been required.   AM labs    High risk for clinical decline.     High level of risk due to:  severe exacerbation of chronic illness and illness with threat to life or bodily function.    Plan of care and goals reviewed during interdisciplinary rounds.  I discussed the patient's findings and my recommendations with patient and nursing staff      Jeanie Page, DO    Intensive Care Medicine and Pulmonary Medicine

## 2021-02-04 NOTE — PLAN OF CARE
Goal Outcome Evaluation:  Plan of Care Reviewed With: patient  Progress: no change  Outcome Summary: PT wound care to assess for MIST to buttocks. Lt buttock with moist clean pink area, covered with Optifoam. Rt buttock dry area of hyperpigmented skin. Plan to monitor area and reassess in 2-3 days. Cont position changes for off loading.

## 2021-02-04 NOTE — THERAPY WOUND CARE TREATMENT
Acute Care - Wound/Debridement Initial Evaluation   Silvia     Patient Name: Titus Bro  : 1975  MRN: [id number]  Today's Date: 2021         Referring Physician: DO Camilo      Admit Date: 2021    Visit Dx:    ICD-10-CM ICD-9-CM   1. Acute pancreatitis, unspecified complication status, unspecified pancreatitis type  K85.90 577.0       Patient Active Problem List   Diagnosis   • Acute pancreatitis. Alcoholic +/- hyperlipidemia induced   • Alcohol abuse   • Lactic acidosis   • Essential hypertension   • GERD without esophagitis   • Class 3 severe obesity in adult    • LIANA   • Alcohol withdrawal    • Hypertriglyceridemia   • Fatty liver   • Acute hypoxemic respiratory failure requiring NIV. ICU transfer 2021 (CMS/[facility])   • Fever and increased procalcitonin. ? source        Past Medical History:   Diagnosis Date   • Anxiety    • Arthritis    • Asthma    • COPD (chronic obstructive pulmonary disease) (CMS/[facility])    • GERD (gastroesophageal reflux disease)    • H/O chest tube placement    • Hypertension    • Seizures (CMS/[facility])     WITHDRAWAL        Past Surgical History:   Procedure Laterality Date   • KNEE ARTHROSCOPY Bilateral    • WRIST ARTHROPLASTY Left            LDA Wound     Row Name 21 0935             Wound 21  Left foot    Wound - Properties Group Placement Date: 21  -JH Placement Time: --  -JH, present on arrival to unit  Side: Left  - Location: foot  -JH Stage, Pressure Injury : unstageable  -JH    Closure  Open to air  -MW      Base  dry;scab  -MW      Periwound  dry  -MW      Edges  irregular  -MW      Drainage Amount  none  -MW      Dressing Care  open to air  -MW      Retired Wound - Properties Group Date first assessed: 21  - Time first assessed: --  -JH, present on arrival to unit  Side: Left  - Location: foot  -JH       Wound 21 0800 Left gluteal    Wound - Properties Group Placement Date: 21  -DA Placement Time: 0800  -DA Present on  "Hospital Admission: N  -DA Side: Left  -DA Location: gluteal  -DA Stage, Pressure Injury : Stage 2  -DA    Wound Image  Images linked: 1  -MW      Base  blanchable;red;pink;moist  -MW      Periwound  intact;dry  -MW      Periwound Temperature  warm  -MW      Periwound Skin Turgor  soft  -MW      Edges  irregular  -MW      Wound Length (cm)  1.1 cm  -MW      Wound Width (cm)  0.3 cm  -MW      Wound Depth (cm)  0.1 cm  -MW      Drainage Amount  scant  -MW      Care, Wound  cleansed with;wound cleanser  -MW      Dressing Care  dressing applied;low-adherent;border dressing 4\" optifoam   -MW      Periwound Care  cleansed with pH balanced cleanser;dry periwound area maintained  -MW      Retired Wound - Properties Group Date first assessed: 02/03/21  -DA Time first assessed: 0800  -DA Present on Hospital Admission: N  -DA Side: Left  -DA Location: gluteal  -DA       Wound 02/03/21 0800 Right gluteal    Wound - Properties Group Placement Date: 02/03/21  -DA Placement Time: 0800 -DA Present on Hospital Admission: N  -DA Side: Right  -DA Location: gluteal  -DA Stage, Pressure Injury : deep tissue injury  -DA    Wound Image  Images linked: 1  -MW      Closure  Open to air  -MW      Base  non-blanchable;dry darker pigmented skin   -MW      Periwound  intact;dry  -MW      Periwound Temperature  warm  -MW      Periwound Skin Turgor  firm  -MW      Edges  irregular  -MW      Drainage Amount  none  -MW      Care, Wound  cleansed with;wound cleanser  -MW      Dressing Care  open to air  -MW      Retired Wound - Properties Group Date first assessed: 02/03/21 -DA Time first assessed: 0800  -DA Present on Hospital Admission: N  -DA Side: Right  -DA Location: gluteal  -DA      User Key  (r) = Recorded By, (t) = Taken By, (c) = Cosigned By    Initials Name Provider Type    Lavonne Presley, PT Physical Therapist    Dilip Blanco RN Registered Nurse     Tra, Star, RN Registered Nurse        Wound 01/22/21  Left foot " (Active)   Dressing Appearance open to air 02/04/21 0200   Closure Open to air 02/04/21 1000   Base dry;scab 02/04/21 1000   Periwound dry 02/04/21 1000   Edges irregular 02/04/21 1000   Drainage Amount none 02/04/21 1000   Care, Wound cleansed with;soap and water 02/04/21 0200   Dressing Care open to air 02/04/21 0935   Periwound Care dry periwound area maintained 02/03/21 2000       Wound 02/03/21 0800 Left gluteal (Active)   Wound Image   02/04/21 0935   Dressing Appearance open to air 02/03/21 2000   Closure Open to air 02/04/21 0800   Base dressing in place, unable to visualize 02/04/21 1000   Periwound intact;dry 02/04/21 1000   Periwound Temperature warm 02/04/21 1000   Periwound Skin Turgor soft 02/04/21 1000   Edges irregular 02/04/21 1000   Wound Length (cm) 1.1 cm 02/04/21 0935   Wound Width (cm) 0.3 cm 02/04/21 0935   Wound Depth (cm) 0.1 cm 02/04/21 0935   Drainage Amount scant 02/04/21 0935   Care, Wound cleansed with;wound cleanser 02/04/21 0935   Dressing Care dressing applied;low-adherent;border dressing 02/04/21 0935   Periwound Care cleansed with pH balanced cleanser;dry periwound area maintained 02/04/21 0935       Wound 02/03/21 0800 Right gluteal (Active)   Wound Image   02/04/21 0935   Closure Open to air 02/04/21 1000   Base non-blanchable;dry;other (see comments) 02/04/21 1000   Periwound intact;dry 02/04/21 1000   Periwound Temperature warm 02/04/21 1000   Periwound Skin Turgor firm 02/04/21 1000   Edges irregular 02/04/21 1000   Drainage Amount none 02/04/21 1000   Care, Wound cleansed with;wound cleanser 02/04/21 0935   Dressing Care open to air 02/04/21 0935                             PT Assessment (last 12 hours)      PT Evaluation and Treatment     Row Name 02/04/21 0935          Physical Therapy Time and Intention    Subjective Information  complains of;pain mumbling about discomfort various places with rolling   -MW     Document Type  wound care;therapy note (daily note);evaluation   -MW     Mode of Treatment  individual therapy;physical therapy  -MW     Patient Effort  poor  -MW     Comment  Assist of 3-4 to roll for skin care   -MW     Row Name 02/04/21 0935          General Information    Patient Profile Reviewed  yes  -MW     Referring Physician  Case, DO  -MW     Patient Observations  alert;poorly cooperative;agree to therapy  -MW     General Observations of Patient  In santy bed, restraints in place, gordon, NG feeding tube and drainage tube, mulitple lines.   -MW     Prior Level of Function  -- difficult to assess, likely was indep  -MW     Pertinent History of Current Functional Problem  Pt admitted via ED for LUQ pain;   -MW     Existing Precautions/Restrictions  fall;oxygen therapy device and L/min;seizures  -MW     Risks Reviewed  patient:;increased discomfort;change in vital signs  -MW     Benefits Reviewed  patient:;improve skin integrity  -MW     Barriers to Rehab  medically complex;previous functional deficit;ineffective coping  -MW     Row Name 02/04/21 0904          Cognition    Affect/Mental Status (Cognitive)  low arousal/lethargic;anxious  -MW     Behavioral Issues (Cognitive)  unable/difficult to assess  -MW     Orientation Status (Cognition)  oriented to;person  -MW     Follows Commands (Cognition)  0-24% accuracy  -MW     Cognitive Function (Cognitive)  unable/difficult to assess  -MW     Row Name 02/04/21 0935          Pain Scale: Word Pre/Post-Treatment    Pain: Word Scale, Pretreatment  2 - mild pain  -MW     Posttreatment Pain Rating  2 - mild pain  -MW     Pain Location - Orientation  generalized  -MW     Pre/Posttreatment Pain Comment  mumbling, uncomfortable with rolling   -MW     Pain Intervention(s)  Repositioned  -MW     Row Name 02/04/21 0943          Bed Mobility    Bed Mobility  rolling left;rolling right  -MW     Rolling Left [place] (Bed Mobility)  dependent (less than 25% patient effort);2 person assist 3-4 person assist   -MW     Rolling Right  [place] (Bed Mobility)  dependent (less than 25% patient effort) 3-4 person assist   -MW     Comment (Bed Mobility)  rolling for skin inspection and dry flow placement, repositioning   -MW     Row Name             Wound 01/22/21  Left foot    Wound - Properties Group Placement Date: 01/22/21  -JH Placement Time: --  -JH, present on arrival to unit  Side: Left  -JH Location: foot  -JH Stage, Pressure Injury : unstageable  -JH    Retired Wound - Properties Group Date first assessed: 01/22/21  - Time first assessed: --  -JH, present on arrival to unit  Side: Left  -JH Location: foot  -JH    Row Name             Wound 02/03/21 0800 Left gluteal    Wound - Properties Group Placement Date: 02/03/21  -DA Placement Time: 0800  -DA Present on Hospital Admission: N  -DA Side: Left  -DA Location: gluteal  -DA Stage, Pressure Injury : Stage 2  -DA    Retired Wound - Properties Group Date first assessed: 02/03/21  -DA Time first assessed: 0800  -DA Present on Hospital Admission: N  -DA Side: Left  -DA Location: gluteal  -DA    Row Name             Wound 02/03/21 0800 Right gluteal    Wound - Properties Group Placement Date: 02/03/21  -DA Placement Time: 0800  -DA Present on Hospital Admission: N  -DA Side: Right  -DA Location: gluteal  -DA Stage, Pressure Injury : deep tissue injury  -DA    Retired Wound - Properties Group Date first assessed: 02/03/21  -DA Time first assessed: 0800  -DA Present on Hospital Admission: N  -DA Side: Right  -DA Location: gluteal  -DA    Row Name 02/04/21 0935          Coping    Observed Emotional State  anxious  -MW     Verbalized Emotional State  acceptance  -MW     Trust Relationship/Rapport  care explained;reassurance provided  -MW     Involvement in Care  not present at bedside  -MW     Row Name 02/04/21 0935          Plan of Care Review    Plan of Care Reviewed With  patient  -MW     Progress  no change  -MW     Outcome Summary  PT wound care to assess for MIST to buttocks. Lt buttock  with moist clean pink area, covered with Optifoam. Rt buttock dry area of hyperpigmented skin. Plan to monitor area and reassess in 2-3 days. Cont position changes for off loading.   -     Row Name 02/04/21 0935          Physical Therapy Goals    Wound Care Goal Selection (PT)  wound care, PT goal 1  -     Row Name 02/04/21 0935          Wound Care Goal 1 (PT)    Wound Care Goal 1 (PT)  Buttock skin to remain stable or improve with off loading and foam dressing   -MW     Barriers (Wound Care Goal 1, PT)  med complex   -MW     Row Name 02/04/21 0935          Positioning and Restraints    Pre-Treatment Position  in bed  -MW     Post Treatment Position  bed  -MW     In Bed  side lying right;call light within reach;encouraged to call for assist;exit alarm on;with nsg  -MW     Restraints  released:;reapplied:;soft limb  -MW     Row Name 02/04/21 0935          Therapy Assessment/Plan (PT)    Rehab Potential (PT)  fair, will monitor progress closely  -MW     Criteria for Skilled Interventions Met (PT)  other (see comments) wound care to follow up in 2-3 days   -MW     Comment, Therapy Assessment/Plan (PT)  Monitor buttock skin; check back in 2-3 days. Nsg to cont to off load, cover Lt open area with foam dressing.   -     Row Name 02/04/21 0935          PT Evaluation Complexity    History, PT Evaluation Complexity  1-2 personal factors and/or comorbidities  -MW     Examination of Body Systems (PT Eval Complexity)  1-2 elements  -MW     Clinical Presentation (PT Evaluation Complexity)  evolving  -MW     Clinical Decision Making (PT Evaluation Complexity)  low complexity  -MW     Overall Complexity (PT Evaluation Complexity)  low complexity  -     Row Name 02/04/21 0935          Therapy Plan Review/Discharge Plan (PT)    Therapy Plan Review (PT)  evaluation/treatment results reviewed nsg  -       User Key  (r) = Recorded By, (t) = Taken By, (c) = Cosigned By    Initials Name Provider Type    Lavonne Presley  PT Physical Therapist    Dilip Blanco, RN Registered Nurse    Star Mcduffie, RN Registered Nurse            Recommendation and Plan  Anticipated Discharge Disposition (PT): skilled nursing facility  Plan of Care Reviewed With: patient   Progress: no change       Progress: no change  Outcome Summary: PT wound care to assess for MIST to buttocks. Lt buttock with moist clean pink area, covered with Optifoam. Rt buttock dry area of hyperpigmented skin. Plan to monitor area and reassess in 2-3 days. Cont position changes for off loading.   Plan of Care Reviewed With: patient            Time Calculation  PT Charges     Row Name 02/04/21 1156             Time Calculation    Start Time  0935  -MW      PT Goal Re-Cert Due Date  02/14/21  -MW        User Key  (r) = Recorded By, (t) = Taken By, (c) = Cosigned By    Initials Name Provider Type    Lavonne Presley, PT Physical Therapist            Therapy Charges for Today     Code Description Service Date Service Provider Modifiers Qty    83052973597 HC PT EVAL LOW COMPLEXITY 4 2/4/2021 Lavonne Rueda, PT GP 1            PT G-Codes  AM-PAC 6 Clicks Score (PT): 20       Lavonne Rueda PT  2/4/2021

## 2021-02-04 NOTE — PLAN OF CARE
Goal Outcome Evaluation:  Plan of Care Reviewed With: patient, daughter  Progress: improving     Weaned oxycodone to 10 mg Q 6 hrs and Precedex to 0.9mcg this am. Pt more clear speech and more appropriate at times. Small BM today.200 ml NGT output with TF at goal rate. 40 lasix given with 2950 ml out today. Lifted to chair x 1 hour and pt became increasing restless. Lifted back to bed and placed in chair sling to facilitate BM on bedpan. This worked well for the pt. Resulted in a small bm. Precedex increased back to 1.5mcg.

## 2021-02-05 ENCOUNTER — APPOINTMENT (OUTPATIENT)
Dept: GENERAL RADIOLOGY | Facility: HOSPITAL | Age: 46
End: 2021-02-05

## 2021-02-05 LAB
ALBUMIN SERPL-MCNC: 2.6 G/DL (ref 3.5–5.2)
ALBUMIN/GLOB SERPL: 0.7 G/DL
ALP SERPL-CCNC: 62 U/L (ref 39–117)
ALT SERPL W P-5'-P-CCNC: 16 U/L (ref 1–41)
ANION GAP SERPL CALCULATED.3IONS-SCNC: 8 MMOL/L (ref 5–15)
AST SERPL-CCNC: 22 U/L (ref 1–40)
BILIRUB SERPL-MCNC: 0.2 MG/DL (ref 0–1.2)
BUN SERPL-MCNC: 14 MG/DL (ref 6–20)
BUN/CREAT SERPL: 26.4 (ref 7–25)
CALCIUM SPEC-SCNC: 9 MG/DL (ref 8.6–10.5)
CHLORIDE SERPL-SCNC: 103 MMOL/L (ref 98–107)
CO2 SERPL-SCNC: 32 MMOL/L (ref 22–29)
CREAT SERPL-MCNC: 0.53 MG/DL (ref 0.76–1.27)
DEPRECATED RDW RBC AUTO: 46.5 FL (ref 37–54)
ERYTHROCYTE [DISTWIDTH] IN BLOOD BY AUTOMATED COUNT: 14.2 % (ref 12.3–15.4)
GFR SERPL CREATININE-BSD FRML MDRD: >150 ML/MIN/1.73
GLOBULIN UR ELPH-MCNC: 3.7 GM/DL
GLUCOSE BLDC GLUCOMTR-MCNC: 96 MG/DL (ref 70–130)
GLUCOSE SERPL-MCNC: 126 MG/DL (ref 65–99)
HCT VFR BLD AUTO: 35.2 % (ref 37.5–51)
HGB BLD-MCNC: 10.4 G/DL (ref 13–17.7)
MAGNESIUM SERPL-MCNC: 1.8 MG/DL (ref 1.6–2.6)
MCH RBC QN AUTO: 26.5 PG (ref 26.6–33)
MCHC RBC AUTO-ENTMCNC: 29.5 G/DL (ref 31.5–35.7)
MCV RBC AUTO: 89.6 FL (ref 79–97)
PHOSPHATE SERPL-MCNC: 4 MG/DL (ref 2.5–4.5)
PLATELET # BLD AUTO: 682 10*3/MM3 (ref 140–450)
PMV BLD AUTO: 11.1 FL (ref 6–12)
POTASSIUM SERPL-SCNC: 3.9 MMOL/L (ref 3.5–5.2)
PROT SERPL-MCNC: 6.3 G/DL (ref 6–8.5)
RBC # BLD AUTO: 3.93 10*6/MM3 (ref 4.14–5.8)
SODIUM SERPL-SCNC: 143 MMOL/L (ref 136–145)
WBC # BLD AUTO: 8.02 10*3/MM3 (ref 3.4–10.8)

## 2021-02-05 PROCEDURE — 97164 PT RE-EVAL EST PLAN CARE: CPT

## 2021-02-05 PROCEDURE — 97166 OT EVAL MOD COMPLEX 45 MIN: CPT

## 2021-02-05 PROCEDURE — 25010000002 LORAZEPAM PER 2 MG: Performed by: INTERNAL MEDICINE

## 2021-02-05 PROCEDURE — 25010000002 ENOXAPARIN PER 10 MG: Performed by: FAMILY MEDICINE

## 2021-02-05 PROCEDURE — 94799 UNLISTED PULMONARY SVC/PX: CPT

## 2021-02-05 PROCEDURE — 85027 COMPLETE CBC AUTOMATED: CPT | Performed by: INTERNAL MEDICINE

## 2021-02-05 PROCEDURE — 99232 SBSQ HOSP IP/OBS MODERATE 35: CPT | Performed by: INTERNAL MEDICINE

## 2021-02-05 PROCEDURE — 74018 RADEX ABDOMEN 1 VIEW: CPT

## 2021-02-05 PROCEDURE — 80053 COMPREHEN METABOLIC PANEL: CPT | Performed by: INTERNAL MEDICINE

## 2021-02-05 PROCEDURE — 99233 SBSQ HOSP IP/OBS HIGH 50: CPT | Performed by: INTERNAL MEDICINE

## 2021-02-05 PROCEDURE — 94660 CPAP INITIATION&MGMT: CPT

## 2021-02-05 PROCEDURE — 25010000002 DIAZEPAM PER 5 MG: Performed by: INTERNAL MEDICINE

## 2021-02-05 PROCEDURE — 83735 ASSAY OF MAGNESIUM: CPT | Performed by: INTERNAL MEDICINE

## 2021-02-05 PROCEDURE — 82962 GLUCOSE BLOOD TEST: CPT

## 2021-02-05 PROCEDURE — 25010000003 MAGNESIUM SULFATE 4 GM/100ML SOLUTION: Performed by: NURSE PRACTITIONER

## 2021-02-05 PROCEDURE — 97530 THERAPEUTIC ACTIVITIES: CPT

## 2021-02-05 PROCEDURE — 25010000002 MEROPENEM PER 100 MG: Performed by: INTERNAL MEDICINE

## 2021-02-05 PROCEDURE — 84100 ASSAY OF PHOSPHORUS: CPT | Performed by: INTERNAL MEDICINE

## 2021-02-05 RX ORDER — DIAZEPAM 5 MG/ML
10 INJECTION, SOLUTION INTRAMUSCULAR; INTRAVENOUS NIGHTLY
Status: DISCONTINUED | OUTPATIENT
Start: 2021-02-05 | End: 2021-02-08

## 2021-02-05 RX ORDER — DIAZEPAM 5 MG/ML
5 INJECTION, SOLUTION INTRAMUSCULAR; INTRAVENOUS 3 TIMES DAILY
Status: DISCONTINUED | OUTPATIENT
Start: 2021-02-05 | End: 2021-02-08

## 2021-02-05 RX ADMIN — MEROPENEM 2 G: 1 INJECTION, POWDER, FOR SOLUTION INTRAVENOUS at 08:07

## 2021-02-05 RX ADMIN — LORAZEPAM 2 MG: 2 INJECTION INTRAMUSCULAR; INTRAVENOUS at 22:58

## 2021-02-05 RX ADMIN — LORAZEPAM 2 MG: 2 INJECTION INTRAMUSCULAR; INTRAVENOUS at 03:25

## 2021-02-05 RX ADMIN — OXYCODONE HYDROCHLORIDE 10 MG: 5 TABLET ORAL at 05:08

## 2021-02-05 RX ADMIN — IPRATROPIUM BROMIDE AND ALBUTEROL SULFATE 3 ML: 2.5; .5 SOLUTION RESPIRATORY (INHALATION) at 19:13

## 2021-02-05 RX ADMIN — METOPROLOL TARTRATE 25 MG: 25 TABLET, FILM COATED ORAL at 20:19

## 2021-02-05 RX ADMIN — QUETIAPINE FUMARATE 100 MG: 100 TABLET ORAL at 14:07

## 2021-02-05 RX ADMIN — IPRATROPIUM BROMIDE AND ALBUTEROL SULFATE 3 ML: 2.5; .5 SOLUTION RESPIRATORY (INHALATION) at 06:46

## 2021-02-05 RX ADMIN — DIAZEPAM 5 MG: 5 INJECTION, SOLUTION INTRAMUSCULAR; INTRAVENOUS at 17:48

## 2021-02-05 RX ADMIN — DEXMEDETOMIDINE 1 MCG/KG/HR: 100 INJECTION, SOLUTION, CONCENTRATE INTRAVENOUS at 18:00

## 2021-02-05 RX ADMIN — DEXMEDETOMIDINE 1.4 MCG/KG/HR: 100 INJECTION, SOLUTION, CONCENTRATE INTRAVENOUS at 05:07

## 2021-02-05 RX ADMIN — OXYCODONE HYDROCHLORIDE 10 MG: 5 TABLET ORAL at 17:48

## 2021-02-05 RX ADMIN — IPRATROPIUM BROMIDE AND ALBUTEROL SULFATE 3 ML: 2.5; .5 SOLUTION RESPIRATORY (INHALATION) at 15:51

## 2021-02-05 RX ADMIN — CLONIDINE HYDROCHLORIDE 0.1 MG: 0.1 TABLET ORAL at 20:19

## 2021-02-05 RX ADMIN — DIAZEPAM 5 MG: 5 INJECTION, SOLUTION INTRAMUSCULAR; INTRAVENOUS at 12:05

## 2021-02-05 RX ADMIN — PANTOPRAZOLE SODIUM 40 MG: 40 INJECTION, POWDER, LYOPHILIZED, FOR SOLUTION INTRAVENOUS at 05:07

## 2021-02-05 RX ADMIN — THIAMINE HCL TAB 100 MG 100 MG: 100 TAB at 08:08

## 2021-02-05 RX ADMIN — CLONIDINE HYDROCHLORIDE 0.1 MG: 0.1 TABLET ORAL at 08:07

## 2021-02-05 RX ADMIN — DIAZEPAM 5 MG: 5 INJECTION, SOLUTION INTRAMUSCULAR; INTRAVENOUS at 08:08

## 2021-02-05 RX ADMIN — BISACODYL 10 MG: 10 SUPPOSITORY RECTAL at 08:09

## 2021-02-05 RX ADMIN — DIAZEPAM 10 MG: 5 INJECTION, SOLUTION INTRAMUSCULAR; INTRAVENOUS at 20:19

## 2021-02-05 RX ADMIN — ESCITALOPRAM OXALATE 20 MG: 20 TABLET ORAL at 08:08

## 2021-02-05 RX ADMIN — SODIUM CHLORIDE, PRESERVATIVE FREE 10 ML: 5 INJECTION INTRAVENOUS at 08:09

## 2021-02-05 RX ADMIN — FOLIC ACID 1 MG: 1 TABLET ORAL at 08:08

## 2021-02-05 RX ADMIN — DIAZEPAM 5 MG: 5 INJECTION, SOLUTION INTRAMUSCULAR; INTRAVENOUS at 01:51

## 2021-02-05 RX ADMIN — IPRATROPIUM BROMIDE AND ALBUTEROL SULFATE 3 ML: 2.5; .5 SOLUTION RESPIRATORY (INHALATION) at 23:46

## 2021-02-05 RX ADMIN — IPRATROPIUM BROMIDE AND ALBUTEROL SULFATE 3 ML: 2.5; .5 SOLUTION RESPIRATORY (INHALATION) at 03:01

## 2021-02-05 RX ADMIN — ENOXAPARIN SODIUM 40 MG: 40 INJECTION SUBCUTANEOUS at 08:08

## 2021-02-05 RX ADMIN — MAGNESIUM SULFATE 4 G: 4 INJECTION INTRAVENOUS at 06:55

## 2021-02-05 RX ADMIN — ENOXAPARIN SODIUM 40 MG: 40 INJECTION SUBCUTANEOUS at 20:19

## 2021-02-05 RX ADMIN — DEXMEDETOMIDINE 1.5 MCG/KG/HR: 100 INJECTION, SOLUTION, CONCENTRATE INTRAVENOUS at 00:22

## 2021-02-05 RX ADMIN — IPRATROPIUM BROMIDE AND ALBUTEROL SULFATE 3 ML: 2.5; .5 SOLUTION RESPIRATORY (INHALATION) at 12:11

## 2021-02-05 RX ADMIN — LORAZEPAM 2 MG: 2 INJECTION INTRAMUSCULAR; INTRAVENOUS at 15:14

## 2021-02-05 RX ADMIN — QUETIAPINE FUMARATE 100 MG: 100 TABLET ORAL at 21:14

## 2021-02-05 RX ADMIN — MEROPENEM 2 G: 1 INJECTION, POWDER, FOR SOLUTION INTRAVENOUS at 16:15

## 2021-02-05 RX ADMIN — OXYCODONE HYDROCHLORIDE 10 MG: 5 TABLET ORAL at 23:00

## 2021-02-05 RX ADMIN — METOPROLOL TARTRATE 25 MG: 25 TABLET, FILM COATED ORAL at 08:08

## 2021-02-05 RX ADMIN — OXYCODONE HYDROCHLORIDE 10 MG: 5 TABLET ORAL at 14:07

## 2021-02-05 RX ADMIN — LORAZEPAM 2 MG: 2 INJECTION INTRAMUSCULAR; INTRAVENOUS at 10:31

## 2021-02-05 RX ADMIN — SENNOSIDES 10 ML: 8.8 LIQUID ORAL at 20:19

## 2021-02-05 RX ADMIN — DEXMEDETOMIDINE 0.7 MCG/KG/HR: 100 INJECTION, SOLUTION, CONCENTRATE INTRAVENOUS at 12:04

## 2021-02-05 RX ADMIN — SODIUM CHLORIDE, PRESERVATIVE FREE 10 ML: 5 INJECTION INTRAVENOUS at 20:19

## 2021-02-05 RX ADMIN — QUETIAPINE FUMARATE 100 MG: 100 TABLET ORAL at 05:07

## 2021-02-05 NOTE — PROGRESS NOTES
Multidisciplinary Rounds    Time: 20min  Patient Name: Titus Bro  Date of Encounter: 02/05/21 09:23 EST  MRN: [id number]  Admission date: 1/19/2021      Reason for visit: MDR. RD to continue to follow per protocol.     Additional information obtained during MDR: Pt tolerating EN, with 96% of EN goal volume delivered over the past 24 hrs. Receiving dulcolax, relistor, and senokot. Ileus- slightly improved. Pt continues on precedex.     Per I&O over the past 24 hrs:  NGT output= 600 ml  2 bowel movements    Current diet: NPO Diet    EN: Peptamen Intense VHP at 100 ml/hr and free water at 50 ml/hr via NGDT    Intervention:  Follow treatment plan  Care plan reviewed    Follow up:   Per protocol      Lynette Silver MS RD/[facility]  09:23 EST

## 2021-02-05 NOTE — THERAPY EVALUATION
Patient Name: Titus Bro  : 1975    MRN: [id number]                              Today's Date: 2021       Admit Date: 2021    Visit Dx:     ICD-10-CM ICD-9-CM   1. Acute pancreatitis, unspecified complication status, unspecified pancreatitis type  K85.90 577.0     Patient Active Problem List   Diagnosis   • Acute pancreatitis. Alcoholic +/- hyperlipidemia induced   • Alcohol abuse   • Lactic acidosis   • Essential hypertension   • GERD without esophagitis   • Class 3 severe obesity in adult    • LIANA   • Alcohol withdrawal    • Hypertriglyceridemia   • Fatty liver   • Acute hypoxemic respiratory failure requiring NIV. ICU transfer 2021 (CMS/[facility])   • Fever and increased procalcitonin. ? source     Past Medical History:   Diagnosis Date   • Anxiety    • Arthritis    • Asthma    • COPD (chronic obstructive pulmonary disease) (CMS/[facility])    • GERD (gastroesophageal reflux disease)    • H/O chest tube placement    • Hypertension    • Seizures (CMS/[facility])     WITHDRAWAL     Past Surgical History:   Procedure Laterality Date   • KNEE ARTHROSCOPY Bilateral    • WRIST ARTHROPLASTY Left      General Information     Row Name 21 1125          OT Time and Intention    Document Type  evaluation  -CL     Mode of Treatment  occupational therapy  -     Row Name 21 1125          General Information    Patient Profile Reviewed  yes  -CL     Prior Level of Function  independent:;all household mobility;ADL's  -CL     Existing Precautions/Restrictions  fall;oxygen therapy device and L/min;seizures;other (see comments) NG to suction, dobhoff  -CL     Barriers to Rehab  medically complex;cognitive status  -CL     Row Name 21 1125          Living Environment    Lives With  alone TBA further, pt poor historian  -CL     Row Name 21 1125          Cognition    Orientation Status (Cognition)  oriented to  -CL     Row Name 21 1125          Safety Issues, Functional Mobility    Impairments  Affecting Function (Mobility)  balance;cognition;endurance/activity tolerance;shortness of breath;strength;postural/trunk control;motor control  -CL     Cognitive Impairments, Mobility Safety/Performance  attention;awareness, need for assistance;insight into deficits/self-awareness;safety precaution awareness  -CL       User Key  (r) = Recorded By, (t) = Taken By, (c) = Cosigned By    Initials Name Provider Type    CL Susannah Ronquillo OT Occupational Therapist          Mobility/ADL's     Row Name 02/05/21 1127          Transfers    Transfers  bed-chair transfer  -CL     Bed-Chair [place] (Transfers)  dependent (less than 25% patient effort);2 person assist;verbal cues  -CL     Assistive Device (Bed-Chair Transfers)  lift device  -CL     Row Name 02/05/21 1127          Activities of Daily Living    BADL Assessment/Intervention  lower body dressing  -     Row Name 02/05/21 1127          Lower Body Dressing Assessment/Training    [place] Level (Lower Body Dressing)  don;socks;dependent (less than 25% patient effort)  -CL     Position (Lower Body Dressing)  supine  -       User Key  (r) = Recorded By, (t) = Taken By, (c) = Cosigned By    Initials Name Provider Type    CL Susannah Ronquillo OT Occupational Therapist        Obj/Interventions     Row Name 02/05/21 1127          Sensory Assessment (Somatosensory)    Sensory Assessment (Somatosensory)  unable/difficult to assess  -     Row Name 02/05/21 1127          Vision Assessment/Intervention    Visual Impairment/Limitations  unable/difficult to assess  -     Row Name 02/05/21 1127          Range of Motion Comprehensive    General Range of Motion  bilateral upper extremity ROM WFL  -     Row Name 02/05/21 1127          Strength Comprehensive (MMT)    Comment, General Manual Muscle Testing (MMT) Assessment  BUE shldr F 2-/5, bicep/tricep 3/5,  3+/5  -     Row Name 02/05/21 1127          Balance    Balance Assessment  sitting static balance;sitting  dynamic balance  -CL     Static Sitting Balance  severe impairment;sitting in chair  -CL     Dynamic Sitting Balance  severe impairment;sitting in chair  -CL     Comment, Balance  A/P leaning x2, significant posterior lean  -CL       User Key  (r) = Recorded By, (t) = Taken By, (c) = Cosigned By    Initials Name Provider Type    Susannah Luther OT Occupational Therapist        Goals/Plan     Row Name 02/05/21 1131          Transfer Goal 1 (OT)    Activity/Assistive Device (Transfer Goal 1, OT)  sit-to-stand/stand-to-sit  -CL     [place] Level/Cues Needed (Transfer Goal 1, OT)  maximum assist (25-49% patient effort)  -CL     Time Frame (Transfer Goal 1, OT)  long term goal (LTG);10 days  -CL     Progress/Outcome (Transfer Goal 1, OT)  goal ongoing  -CL     Row Name 02/05/21 1131          Grooming Goal 1 (OT)    Activity/Device (Grooming Goal 1, OT)  wash face, hands unsupported sitting  -CL     [place] (Grooming Goal 1, OT)  supervision required  -CL     Time Frame (Grooming Goal 1, OT)  long term goal (LTG);10 days  -CL     Progress/Outcome (Grooming Goal 1, OT)  goal ongoing  -CL     Row Name 02/05/21 1131          Strength Goal 1 (OT)    Strength Goal 1 (OT)  Pt will tolerate BUE AAROM HEP x15 reps.  -CL     Time Frame (Strength Goal 1, OT)  long term goal (LTG);10 days  -CL     Progress/Outcome (Strength Goal 1, OT)  goal ongoing  -CL       User Key  (r) = Recorded By, (t) = Taken By, (c) = Cosigned By    Initials Name Provider Type    Susannah Luther OT Occupational Therapist        Clinical Impression     Row Name 02/05/21 1128          Pain Scale: Numbers Pre/Post-Treatment    Pretreatment Pain Rating  0/10 - no pain  -CL     Posttreatment Pain Rating  0/10 - no pain  -CL     Row Name 02/05/21 1128          Plan of Care Review    Plan of Care Reviewed With  patient  -CL     Outcome Summary  OT eval complete. Pt is Depx2 w/ mechcanical lift for bed/chair t/f, limited d/t poor command following and  significant generalized weakness. Recommend cont skilled IPOT POC. Recommend pt DC to IP rehab based on current level of performance.  -CL     Row Name 02/05/21 1128          Therapy Assessment/Plan (OT)    Patient/Family Therapy Goal Statement (OT)  Pt unable to state.  -CL     Rehab Potential (OT)  good, to achieve stated therapy goals  -CL     Criteria for Skilled Therapeutic Interventions Met (OT)  yes;skilled treatment is necessary  -CL     Therapy Frequency (OT)  daily  -CL     Row Name 02/05/21 1128          Therapy Plan Review/Discharge Plan (OT)    Anticipated Discharge Disposition (OT)  inpatient rehabilitation facility  -CL     Row Name 02/05/21 1128          Vital Signs    Pre Systolic BP Rehab  -- VSS  -CL     Pre Patient Position  Supine  -CL     Intra Patient Position  Sitting  -CL     Post Patient Position  Sitting  -CL     Row Name 02/05/21 1128          Positioning and Restraints    Pre-Treatment Position  in bed  -CL     Post Treatment Position  chair  -CL     In Chair  notified nsg;reclined;call light within reach;encouraged to call for assist;exit alarm on;RUE elevated;LUE elevated;waffle cushion;on mechanical lift sling;legs elevated;heels elevated  -CL     Restraints  released:;reapplied:;notified nsg:;soft limb  -CL       User Key  (r) = Recorded By, (t) = Taken By, (c) = Cosigned By    Initials Name Provider Type    CL Susannah Ronquillo, SONAL Occupational Therapist        Outcome Measures     Row Name 02/05/21 113          How much help from another is currently needed...    Putting on and taking off regular lower body clothing?  1  -CL     Bathing (including washing, rinsing, and drying)  1  -CL     Toileting (which includes using toilet bed pan or urinal)  1  -CL     Putting on and taking off regular upper body clothing  1  -CL     Taking care of personal grooming (such as brushing teeth)  2  -CL     Eating meals  1  -CL     AM-PAC 6 Clicks Score (OT)  7  -CL     Row Name 02/05/21 1137           Functional Assessment    Outcome Measure Options  AM-PAC 6 Clicks Daily Activity (OT)  -       User Key  (r) = Recorded By, (t) = Taken By, (c) = Cosigned By    Initials Name Provider Type    CL Susannah Ronquillo OT Occupational Therapist        Occupational Therapy Education                 Title: PT OT SLP Therapies (In Progress)     Topic: Occupational Therapy (In Progress)     Point: ADL training (In Progress)     Description:   Instruct learner(s) on proper safety adaptation and remediation techniques during self care or transfers.   Instruct in proper use of assistive devices.              Learning Progress Summary           Patient Acceptance, E, NR by CL at 2/5/2021 1134                   Point: Home exercise program (Not Started)     Description:   Instruct learner(s) on appropriate technique for monitoring, assisting and/or progressing therapeutic exercises/activities.              Learner Progress:  Not documented in this visit.          Point: Precautions (In Progress)     Description:   Instruct learner(s) on prescribed precautions during self-care and functional transfers.              Learning Progress Summary           Patient Acceptance, E, NR by CL at 2/5/2021 1134                   Point: Body mechanics (In Progress)     Description:   Instruct learner(s) on proper positioning and spine alignment during self-care, functional mobility activities and/or exercises.              Learning Progress Summary           Patient Acceptance, E, NR by CL at 2/5/2021 1134                               User Key     Initials Effective Dates Name Provider Type Discipline     04/03/18 -  Susannah Ronquillo OT Occupational Therapist OT              OT Recommendation and Plan  Therapy Frequency (OT): daily  Plan of Care Review  Plan of Care Reviewed With: patient  Outcome Summary: OT eval complete. Pt is Depx2 w/ mechcanical lift for bed/chair t/f, limited d/t poor command following and significant generalized weakness.  Recommend cont skilled IPOT POC. Recommend pt DC to IP rehab based on current level of performance.     Time Calculation:   Time Calculation- OT     Row Name 02/05/21 1135             Time Calculation- OT    OT Start Time  0834  -CL      OT Received On  02/05/21  -CL      OT Goal Re-Cert Due Date  02/15/21  -CL        User Key  (r) = Recorded By, (t) = Taken By, (c) = Cosigned By    Initials Name Provider Type     Susannah Ronquillo OT Occupational Therapist        Therapy Charges for Today     Code Description Service Date Service Provider Modifiers Qty    18076391407  OT EVAL MOD COMPLEXITY 4 2/5/2021 Susannah Ronquillo OT GO 1    [id number]  OT THER SUPP EA 15 MIN 2/5/2021 Susannah Ronquillo OT GO 2               Susannah Ronquillo OT  2/5/2021

## 2021-02-05 NOTE — THERAPY RE-EVALUATION
Patient Name: Titus Bro  : 1975    MRN: [id number]                              Today's Date: 2021       Admit Date: 2021    Visit Dx:     ICD-10-CM ICD-9-CM   1. Acute pancreatitis, unspecified complication status, unspecified pancreatitis type  K85.90 577.0     Patient Active Problem List   Diagnosis   • Acute pancreatitis. Alcoholic +/- hyperlipidemia induced   • Alcohol abuse   • Lactic acidosis   • Essential hypertension   • GERD without esophagitis   • Class 3 severe obesity in adult    • LIANA   • Alcohol withdrawal    • Hypertriglyceridemia   • Fatty liver   • Acute hypoxemic respiratory failure requiring NIV. ICU transfer 2021 (CMS/[facility])   • Fever and increased procalcitonin. ? source     Past Medical History:   Diagnosis Date   • Anxiety    • Arthritis    • Asthma    • COPD (chronic obstructive pulmonary disease) (CMS/[facility])    • GERD (gastroesophageal reflux disease)    • H/O chest tube placement    • Hypertension    • Seizures (CMS/[facility])     WITHDRAWAL     Past Surgical History:   Procedure Laterality Date   • KNEE ARTHROSCOPY Bilateral    • WRIST ARTHROPLASTY Left      General Information     Row Name 21 1008          Physical Therapy Time and Intention    Document Type  therapy note (daily note)  -     Mode of Treatment  physical therapy  -     Row Name 21 1008          General Information    Patient Profile Reviewed  yes  -LS     Prior Level of Function  independent:;all household mobility;ADL's per CM note  -LS     Existing Precautions/Restrictions  fall;oxygen therapy device and L/min;seizures;other (see comments) NG  -     Barriers to Rehab  medically complex;cognitive status  -     Row Name 21 1008          Living Environment    Lives With  -- see IE for home/ social hx  -     Row Name 21 1008          Home Main Entrance    Number of Stairs, Main Entrance  -- pt unable to state  -     Row Name 21 1008          Cognition    Orientation  Status (Cognition)  oriented to;person  -     Row Name 02/05/21 1008          Safety Issues, Functional Mobility    Impairments Affecting Function (Mobility)  balance;cognition;endurance/activity tolerance;shortness of breath;strength;postural/trunk control;motor control  -     Cognitive Impairments, Mobility Safety/Performance  awareness, need for assistance;safety precaution awareness;insight into deficits/self-awareness  -       User Key  (r) = Recorded By, (t) = Taken By, (c) = Cosigned By    Initials Name Provider Type    LS Martine Arciniega, PT Physical Therapist        Mobility     Row Name 02/05/21 1009          Bed Mobility    Comment (Bed Mobility)  [facility] lift under pt upon arrival  -     Row Name 02/05/21 1009          Transfers    Comment (Transfers)  not appropriate to attempt STS due to decreased cognition and poor trunk control  -[facility] Name 02/05/21 1009          Bed-Chair Transfer    Bed-Chair [place] (Transfers)  dependent (less than 25% patient effort);2 person assist;verbal cues  -     Assistive Device (Bed-Chair Transfers)  lift device  -     Row Name 02/05/21 1009          Gait/Stairs (Locomotion)    [place] Level (Gait)  unable to assess  -       User Key  (r) = Recorded By, (t) = Taken By, (c) = Cosigned By    Initials Name Provider Type     Martine Arciniega, PT Physical Therapist        Obj/Interventions     Row Name 02/05/21 1010          Range of Motion Comprehensive    General Range of Motion  bilateral lower extremity ROM WFL  -     Row Name 02/05/21 1010          Strength Comprehensive (MMT)    General Manual Muscle Testing (MMT) Assessment  lower extremity strength deficits identified  -     Comment, General Manual Muscle Testing (MMT) Assessment  BLE grossly 3+/5  -     Row Name 02/05/21 1010          Balance    Balance Assessment  sitting static balance  -     Static Sitting Balance  severe impairment;sitting in chair;other (see comments) post lean   -[facility] Name 02/05/21 1010          Sensory Assessment (Somatosensory)    Sensory Assessment (Somatosensory)  LE sensation intact  -LS       User Key  (r) = Recorded By, (t) = Taken By, (c) = Cosigned By    Initials Name Provider Type    Martine Perrin, PT Physical Therapist        Goals/Plan     [facility] Name 02/05/21 1020          Bed Mobility Goal 1 (PT)    Activity/Assistive Device (Bed Mobility Goal 1, PT)  sit to supine/supine to sit  -LS     [place] Level/Cues Needed (Bed Mobility Goal 1, PT)  moderate assist (50-74% patient effort)  -LS     Time Frame (Bed Mobility Goal 1, PT)  2 weeks  -LS     Progress/Outcomes (Bed Mobility Goal 1, PT)  goal ongoing  -[facility] Name 02/05/21 1020          Transfer Goal 1 (PT)    Activity/Assistive Device (Transfer Goal 1, PT)  sit-to-stand/stand-to-sit;walker, rolling  -LS     [place] Level/Cues Needed (Transfer Goal 1, PT)  minimum assist (75% or more patient effort)  -LS     Time Frame (Transfer Goal 1, PT)  2 weeks  -LS     Progress/Outcome (Transfer Goal 1, PT)  goal ongoing  -[facility] Name 02/05/21 1020          Gait Training Goal 1 (PT)    Activity/Assistive Device (Gait Training Goal 1, PT)  gait (walking locomotion);assistive device use  -LS     [place] Level (Gait Training Goal 1, PT)  moderate assist (50-74% patient effort);2 person assist  -LS     Distance (Gait Training Goal 1, PT)  10  -LS     Time Frame (Gait Training Goal 1, PT)  2 weeks  -LS     Progress/Outcome (Gait Training Goal 1, PT)  goal ongoing  -       User Key  (r) = Recorded By, (t) = Taken By, (c) = Cosigned By    Initials Name Provider Type    Martine Perrin, PT Physical Therapist        Clinical Impression     Row Name 02/05/21 1017          Pain    Additional Documentation  Pain Scale: Numbers Pre/Post-Treatment (Group)  -LS     Row Name 02/05/21 1017          Pain Scale: Numbers Pre/Post-Treatment    Pretreatment Pain Rating  0/10 - no pain  -LS     Posttreatment  Pain Rating  0/10 - no pain  -LS     Row Name 02/05/21 1017          Plan of Care Review    Plan of Care Reviewed With  patient  -LS     Progress  no change  -LS     Outcome Summary  PT re-evaluation completed (initial eval for PT mobility). Pt demonstrates generalized weakness and decreased indep/safety re: functional mobility, warranting further skilled PT services to promote PLOF. Limited today by decreased cognitive status. Mech lift to recliner, max x2 for static sitting balance, but cooperative and able to follow commands for MMT. Recommend IP rehab at d/c (TBA further pending medical needs at d/c).  -     Row Name 02/05/21 1017          Therapy Assessment/Plan (PT)    Patient/Family Therapy Goals Statement (PT)  return to PLOF  -LS     Rehab Potential (PT)  good, to achieve stated therapy goals  -     Criteria for Skilled Interventions Met (PT)  yes;skilled treatment is necessary  -     Row Name 02/05/21 1017          Vital Signs    Pre Systolic BP Rehab  141  -LS     Pre Treatment Diastolic BP  83  -LS     Pretreatment Heart Rate (beats/min)  86  -LS     Posttreatment Heart Rate (beats/min)  82  -LS     Pre SpO2 (%)  94  -LS     O2 Delivery Pre Treatment  supplemental O2  -LS     O2 Delivery Intra Treatment  supplemental O2  -LS     Post SpO2 (%)  97  -LS     O2 Delivery Post Treatment  supplemental O2  -LS     Pre Patient Position  Supine  -LS     Intra Patient Position  Sitting  -LS     Post Patient Position  Sitting  -LS     Row Name 02/05/21 1017          Positioning and Restraints    Pre-Treatment Position  in bed  -LS     Post Treatment Position  chair  -LS     In Chair  notified nsg;reclined;call light within reach;encouraged to call for assist;exit alarm on;waffle cushion;legs elevated;on mechanical lift sling;RUE elevated;LUE elevated;heels elevated  -LS     Restraints  released:;reapplied:;notified nsg:;soft limb  -LS       User Key  (r) = Recorded By, (t) = Taken By, (c) = Cosigned By     Initials Name Provider Type    Martine Perrin, PT Physical Therapist        Outcome Measures     Row Name 02/05/21 1021          How much help from another person do you currently need...    Turning from your back to your side while in flat bed without using bedrails?  2  -LS     Moving from lying on back to sitting on the side of a flat bed without bedrails?  2  -LS     Moving to and from a bed to a chair (including a wheelchair)?  1  -LS     Standing up from a chair using your arms (e.g., wheelchair, bedside chair)?  1  -LS     Climbing 3-5 steps with a railing?  1  -LS     To walk in hospital room?  1  -LS     AM-PAC 6 Clicks Score (PT)  8  -LS       User Key  (r) = Recorded By, (t) = Taken By, (c) = Cosigned By    Initials Name Provider Type    Martine Perrin, PT Physical Therapist        Physical Therapy Education                 Title: PT OT SLP Therapies (In Progress)     Topic: Physical Therapy (In Progress)     Point: Mobility training (In Progress)     Learning Progress Summary           Patient Acceptance, E,D, NR by  at 2/5/2021 1021                   Point: Home exercise program (Not Started)     Learner Progress:  Not documented in this visit.          Point: Body mechanics (In Progress)     Learning Progress Summary           Patient Acceptance, E,D, NR by  at 2/5/2021 1021                   Point: Precautions (In Progress)     Learning Progress Summary           Patient Acceptance, E,D, NR by  at 2/5/2021 1021                               User Key     Initials Effective Dates Name Provider Type Discipline     06/19/15 -  Martine Arciniega, PT Physical Therapist PT              PT Recommendation and Plan  Planned Therapy Interventions (PT): balance training, bed mobility training, gait training, home exercise program, patient/family education, strengthening, transfer training  Plan of Care Reviewed With: patient  Progress: no change  Outcome Summary: PT re-evaluation completed (initial  eval for PT mobility). Pt demonstrates generalized weakness and decreased indep/safety re: functional mobility, warranting further skilled PT services to promote PLOF. Limited today by decreased cognitive status. [facility], max x2 for static sitting balance, but cooperative and able to follow commands for MMT. Recommend IP rehab at d/c (TBA further pending medical needs at d/c).     Time Calculation:   PT Charges     Row Name 02/05/21 1021             Time Calculation    Start Time  0845  -      PT Received On  02/05/21  -      PT Goal Re-Cert Due Date  02/15/21  -         Time Calculation- PT    Total Timed Code Minutes- PT  10 minute(s)  -LS         Timed Charges    73169 - PT Therapeutic Activity Minutes  10  -LS        User Key  (r) = Recorded By, (t) = Taken By, (c) = Cosigned By    Initials Name Provider Type     Martine Arciniega, PT Physical Therapist        Therapy Charges for Today     Code Description Service Date Service Provider Modifiers Qty    96699961870 HC PT RE-EVAL ESTABLISHED PLAN 2 2/5/2021 Martine Arciniega, PT GP 1    71690823630 HC PT THERAPEUTIC ACT EA 15 MIN 2/5/2021 Martine Arciniega, PT GP 1    51820761951 HC PT THER SUPP EA 15 MIN 2/5/2021 Martine Arciniega, PT GP 2          PT G-Codes  AM-PAC 6 Clicks Score (PT): 8    Martine Arciniega, PT  2/5/2021

## 2021-02-05 NOTE — PLAN OF CARE
Goal Outcome Evaluation:  Plan of Care Reviewed With: patient  Progress: no change  Outcome Summary: Remains confused, agitated/restless at times. Precedex infusing @1.2mcg/kg/hr. Tolerated BiPap all night 18/8 30%. Mag replaced this AM per protocol. UOP adequate, no BM, Tmax 99.9

## 2021-02-05 NOTE — PLAN OF CARE
Goal Outcome Evaluation:  Plan of Care Reviewed With: patient     Outcome Summary: OT eval complete. Pt is Depx2 w/ mechcanical lift for bed/chair t/f, limited d/t poor command following and significant generalized weakness. Recommend cont skilled IPOT POC. Recommend pt DC to IP rehab based on current level of performance.

## 2021-02-05 NOTE — PROGRESS NOTES
"GI Daily Progress Note  Subjective     Debi Rivas is a 46 y.o. male who was admitted with Acute pancreatitis.   More awake and alert.  Had BM yesterday and last night.  Difficult to understand his speech.  Murrayoframona came out and has been replaced.    Chief Complaint:  Abd pain    Objective     /93   Pulse 96   Temp 100.2 °F (37.9 °C) (Bladder)   Resp (!) 32   Ht 188 cm (74.02\")   Wt (!) 206 kg (454 lb 14.4 oz) Comment: per bed scale   SpO2 92%   BMI 58.38 kg/m²     Intake/Output last 3 shifts:  I/O last 3 completed shifts:  In: 6792 [I.V.:2115; Other:1595; NG/GT:2912; IV Piggyback:170]  Out: 5705 [Urine:4955; Emesis/NG output:750]  Intake/Output this shift:  I/O this shift:  In: 885.2 [I.V.:236.6; Other:250; NG/GT:333; IV Piggyback:65.6]  Out: 535 [Urine:435; Emesis/NG output:100]      Physical Exam  Wt Readings from Last 3 Encounters:   02/01/21 (!) 206 kg (454 lb 14.4 oz)   06/10/20 (!) 152 kg (335 lb)   08/07/19 (!) 144 kg (318 lb)   ,body mass index is 58.38 kg/m².,@FLOWAMB(6)@,@FLOWAMB(5)@,@FLOWAMB(8)@   CONSTITUTIONAL:lying in bed in arm restraints  Resp CTA; no rhonchi, rales, or wheezes.  Respiration effort normal  CV RRR; no M/R/G. No lower extremity edema  GI Abd soft, NT, Obese and distended, normal active bowel sounds.    Psych: Awake and alert  DATA:Results for DEBI RIVAS (MRN [id number]) as of 2/5/2021 14:31   Ref. Range 2/5/2021 04:24   Glucose Latest Ref Range: 65 - 99 mg/dL 126 (H)   Sodium Latest Ref Range: 136 - 145 mmol/L 143   Potassium Latest Ref Range: 3.5 - 5.2 mmol/L 3.9   CO2 Latest Ref Range: 22.0 - 29.0 mmol/L 32.0 (H)   Chloride Latest Ref Range: 98 - 107 mmol/L 103   Anion Gap Latest Ref Range: 5.0 - 15.0 mmol/L 8.0   Creatinine Latest Ref Range: 0.76 - 1.27 mg/dL 0.53 (L)   BUN Latest Ref Range: 6 - 20 mg/dL 14   BUN/Creatinine Ratio Latest Ref Range: 7.0 - 25.0  26.4 (H)   Calcium Latest Ref Range: 8.6 - 10.5 mg/dL 9.0   eGFR African Am Latest Ref Range: >60 " mL/min/1.73 >150   Alkaline Phosphatase Latest Ref Range: 39 - 117 U/L 62   Total Protein Latest Ref Range: 6.0 - 8.5 g/dL 6.3   ALT (SGPT) Latest Ref Range: 1 - 41 U/L 16   AST (SGOT) Latest Ref Range: 1 - 40 U/L 22   Total Bilirubin Latest Ref Range: 0.0 - 1.2 mg/dL 0.2   Albumin Latest Ref Range: 3.50 - 5.20 g/dL 2.60 (L)   Globulin Latest Units: gm/dL 3.7   A/G Ratio Latest Units: g/dL 0.7   Phosphorus Latest Ref Range: 2.5 - 4.5 mg/dL 4.0   Magnesium Latest Ref Range: 1.6 - 2.6 mg/dL 1.8   WBC Latest Ref Range: 3.40 - 10.80 10*3/mm3 8.02   RBC Latest Ref Range: 4.14 - 5.80 10*6/mm3 3.93 (L)   Hemoglobin Latest Ref Range: 13.0 - 17.7 g/dL 10.4 (L)   Hematocrit Latest Ref Range: 37.5 - 51.0 % 35.2 (L)   KUB per radiologist     FINDINGS: Feeding tube is seen at the junction of the second and third  portions of the duodenum. The bowel gas pattern is nonobstructive and  similar to the prior exam. NG tube remains in the mid stomach.     IMPRESSION:  Feeding tube has passed to the mid duodenum.     D:  02/05/2021  E:  02/05/2021    Assessment/Plan     1. Severe acute alcoholic pancreatitis with necrosis.  2. Systemic inflammatory response syndrome with fever.  3. Intermittent ileus secondary to abdominal inflammation from pancreatitis, aggravated by opiates.- improved  4. Morbid obesity.    Seems to be slowly improving.  Bowels now moving and tolerating TF.  Still with significant NG output  If worsening sx needs repeat CT with contrast              Acute pancreatitis. Alcoholic +/- hyperlipidemia induced    Alcohol abuse    Essential hypertension    GERD without esophagitis    Class 3 severe obesity in adult     LIANA    Alcohol withdrawal     Hypertriglyceridemia    Fatty liver    Acute hypoxemic respiratory failure requiring NIV. ICU transfer 1/22/2021 (CMS/[facility])    Fever and increased procalcitonin. ? source       LOS: 17 days     Vipin Holliday MD  02/05/21  14:30 EST

## 2021-02-05 NOTE — PROGRESS NOTES
Continued Stay Note  [facility]     Patient Name: Titus Bro  MRN: [id number]  Today's Date: 2/5/2021    Admit Date: 1/19/2021    Discharge Plan     Row Name 02/05/21 1141       Plan    Plan  Ongoing    Plan Comments  Patient remains confused, agitated and restless at times.  Alternates between BiPAP and nasal cannula.  PT/OT working with patient.  Patient still being lifted to chair.  Patient has NG with tube feedings and is in restraints.  Not appropriate for referrals at this time.  CM will continue to follow.        Discharge Codes    No documentation.       Expected Discharge Date and Time     Expected Discharge Date Expected Discharge Time    Feb 8, 2021             Krystal Salmeron RN

## 2021-02-05 NOTE — SIGNIFICANT NOTE
"  Staff member notify nursing station of patient \"in floor\". Chair alarmed at 1128. Another staff member states seeing patient majority on the floor with head leaning against chair, then assisted fully to the floor. Full body assessment completed, no injury noted. APRN and daughter notify.     "

## 2021-02-05 NOTE — PLAN OF CARE
Goal Outcome Evaluation:  Plan of Care Reviewed With: patient  Progress: no change  Outcome Summary: Patient remains restless and agitated at times. PRN Ativan given x2. Precedex gtt continues. BM x1. 250ml output from NG. Keofeed replaced. Will continue to monitor.

## 2021-02-05 NOTE — PROGRESS NOTES
INTENSIVIST   PROGRESS NOTE     Hospital:  LOS: 17 days     Mr. Titus Bro, 46 y.o. male is followed for a Chief Complaint of: Encephalopathy      Subjective   S     Interval History:  Agitated overnight. Precedex increased.        The patient's relevant past medical, surgical and social history were reviewed and updated in Epic as appropriate.      ROS: Unable to obtain secondary to mental status.     Objective   O     Vitals:  Temp  Min: 98.6 °F (37 °C)  Max: 100.6 °F (38.1 °C)  BP  Min: 124/75  Max: 167/95  Pulse  Min: 69  Max: 101  Resp  Min: 26  Max: 36  SpO2  Min: 89 %  Max: 100 % Flow (L/min)  Min: 2  Max: 2    Intake/Ouptut 24 hrs (7:00AM - 6:59 AM)  Intake & Output (last 3 days)       02/02 0701 - 02/03 0700 02/03 0701 - 02/04 0700 02/04 0701 - 02/05 0700 02/05 0701 - 02/06 0700    I.V. (mL/kg) 897.2 (4.4) 2176 (10.6) 1408 (6.8) 236.6 (1.1)    Other  250    NG/GT 1451 1472 1923 333    IV Piggyback 300 100 170 65.6    Total Intake(mL/kg) 3278.2 (15.9) 4520 (21.9) 4599 (22.3) 885.2 (4.3)    Urine (mL/kg/hr) 1725 (0.3) 3405 (0.7) 4300 (0.9) 435 (0.5)    Emesis/NG output 200 900 600 100    Stool  0 0     Total Output 1925 4305 4900 535    Net +1353.2 +215 -301 +350.2            Stool Unmeasured Occurrence  1 x 2 x           Medications (drips):  dexmedetomidine, Last Rate: 0.6 mcg/kg/hr (02/05/21 1046)          Physical Examination  Telemetry:  Normal sinus rhythm.    Constitutional:  No acute distress.  Sitting up in a chair.     Eyes: No scleral icterus.   PERRL, EOM intact.    Neck:  Supple, FROM   Cardiovascular: Normal rate, regular and rhythm. Normal heart sounds.  No murmurs, gallop or rub.   Respiratory: No respiratory distress. Normal respiratory effort.  Coarse rhonchi bilaterally.    Abdominal:  Soft. No masses. Nontender. No distension. No HSM.   Extremities: No digital cyanosis. No clubbing.  1+ peripheral edema.   Skin: No rashes, lesions or ulcers   Neurological:   Alert and  interactive. Speech is garbled.               Results from last 7 days   Lab Units 02/05/21  0424 02/04/21  0336 02/03/21  0352   WBC 10*3/mm3 8.02 9.00 10.57   HEMOGLOBIN g/dL 10.4* 10.6* 10.5*   MCV fL 89.6 90.4 88.4   PLATELETS 10*3/mm3 682* 657* 586*     Results from last 7 days   Lab Units 02/05/21  0424 02/04/21  0336 02/03/21  0352   SODIUM mmol/L 143 144 146*   POTASSIUM mmol/L 3.9 3.8 4.2   CO2 mmol/L 32.0* 33.0* 32.0*   CREATININE mg/dL 0.53* 0.66* 0.62*   GLUCOSE mg/dL 126* 129* 115*   MAGNESIUM mg/dL 1.8 2.1 1.9   PHOSPHORUS mg/dL 4.0 3.5 4.3     Estimated Creatinine Clearance: 325.2 mL/min (A) (by C-G formula based on SCr of 0.53 mg/dL (L)).  Results from last 7 days   Lab Units 02/05/21  0424 02/03/21  0352 02/01/21  0339   ALK PHOS U/L 62 73 83   BILIRUBIN mg/dL 0.2 0.3 0.3   ALT (SGPT) U/L 16 21 22   AST (SGOT) U/L 22 38 60*       Results from last 7 days   Lab Units 02/01/21  0140   PH, ARTERIAL pH units 7.422   PCO2, ARTERIAL mm Hg 49.4*   PO2 ART mm Hg 79.9*   FIO2 % 33       Images:    Imaging Results (Last 24 Hours)     ** No results found for the last 24 hours. **          Results: Reviewed.  I reviewed the patient's new laboratory and imaging results.  I independently reviewed the patient's new images.    Medications: Reviewed.    Assessment/Plan   A / P     Mr. Bro is a 45yo M with a history of ETOH abuse who was admitted on 1/19/21 with pancreatitis. He was admitted to Hospital Medicine but developed ETOH withdrawal necessitating transfer to the ICU on 1/22/21. He was placed on a Precedex drip and given IV Valium. He continued to require intermittent doses of Ativan despite this. He ultimately required intubation with mechanical ventilation on 1/24/21. A repeat CT of the abdomen/pelvis was performed on 1/27/21 due to worsening fevers and showed some possible necrosis in the head of the pancreas and worsening peripancreatic fluid collections. GI is following. He was extubated on 1/31/21.  Respiratory status remains marginal on/off Bipap. He continues to have periods of extreme agitation.      Nutrition:   NPO Diet  Advance Directives:   Code Status and Medical Interventions:   Ordered at: 01/19/21 1404     Level Of Support Discussed With:    Patient     Code Status:    CPR     Medical Interventions (Level of Support Prior to Arrest):    Full       Active Hospital Problems    Diagnosis   • **Acute pancreatitis. Alcoholic +/- hyperlipidemia induced   • Alcohol withdrawal    • Hypertriglyceridemia   • Fatty liver   • Acute hypoxemic respiratory failure requiring NIV. ICU transfer 1/22/2021 (CMS/[facility])   • Fever and increased procalcitonin. ? source   • Class 3 severe obesity in adult    • LIANA   • Alcohol abuse   • Essential hypertension   • GERD without esophagitis       Assessment / Plan:    Continue to monitor in the ICU. High risk for reintubation.  NT suctioning as needed.   Continue Seroquel. Wean Precedex.   Increased nighttime Valium.     Continue tube feeds.    Finish Meropenem today.  Continue thiamine  Replace magnesium  Bowel regimen  Lasix 40mg IV x 1 again today.   AM labs    High risk for clinical decline.     High level of risk due to:  severe exacerbation of chronic illness and illness with threat to life or bodily function.    Plan of care and goals reviewed during interdisciplinary rounds.  I discussed the patient's findings and my recommendations with patient and nursing staff      Jeanie Page, DO    Intensive Care Medicine and Pulmonary Medicine

## 2021-02-05 NOTE — PATIENT CARE CONFERENCE
ICU Rounds: PT/OT re-evaluations performed this AM. Cont PT woundcare POC. Recommend IP rehab at d/c (versus LTACH pending medical needs at d/c).

## 2021-02-06 LAB
ANION GAP SERPL CALCULATED.3IONS-SCNC: 7 MMOL/L (ref 5–15)
BUN SERPL-MCNC: 15 MG/DL (ref 6–20)
BUN/CREAT SERPL: 29.4 (ref 7–25)
CALCIUM SPEC-SCNC: 8.9 MG/DL (ref 8.6–10.5)
CHLORIDE SERPL-SCNC: 102 MMOL/L (ref 98–107)
CO2 SERPL-SCNC: 30 MMOL/L (ref 22–29)
CREAT SERPL-MCNC: 0.51 MG/DL (ref 0.76–1.27)
DEPRECATED RDW RBC AUTO: 45.2 FL (ref 37–54)
ERYTHROCYTE [DISTWIDTH] IN BLOOD BY AUTOMATED COUNT: 14.2 % (ref 12.3–15.4)
GFR SERPL CREATININE-BSD FRML MDRD: >150 ML/MIN/1.73
GLUCOSE SERPL-MCNC: 139 MG/DL (ref 65–99)
HCT VFR BLD AUTO: 34.1 % (ref 37.5–51)
HGB BLD-MCNC: 10.3 G/DL (ref 13–17.7)
MAGNESIUM SERPL-MCNC: 1.9 MG/DL (ref 1.6–2.6)
MCH RBC QN AUTO: 26.3 PG (ref 26.6–33)
MCHC RBC AUTO-ENTMCNC: 30.2 G/DL (ref 31.5–35.7)
MCV RBC AUTO: 87.2 FL (ref 79–97)
PHOSPHATE SERPL-MCNC: 3.3 MG/DL (ref 2.5–4.5)
PLATELET # BLD AUTO: 687 10*3/MM3 (ref 140–450)
PMV BLD AUTO: 10.7 FL (ref 6–12)
POTASSIUM SERPL-SCNC: 4.1 MMOL/L (ref 3.5–5.2)
RBC # BLD AUTO: 3.91 10*6/MM3 (ref 4.14–5.8)
SODIUM SERPL-SCNC: 139 MMOL/L (ref 136–145)
WBC # BLD AUTO: 8.39 10*3/MM3 (ref 3.4–10.8)

## 2021-02-06 PROCEDURE — 83735 ASSAY OF MAGNESIUM: CPT | Performed by: INTERNAL MEDICINE

## 2021-02-06 PROCEDURE — 25010000002 LORAZEPAM PER 2 MG: Performed by: INTERNAL MEDICINE

## 2021-02-06 PROCEDURE — 25010000002 ENOXAPARIN PER 10 MG: Performed by: FAMILY MEDICINE

## 2021-02-06 PROCEDURE — 84100 ASSAY OF PHOSPHORUS: CPT | Performed by: INTERNAL MEDICINE

## 2021-02-06 PROCEDURE — 25010000002 FUROSEMIDE PER 20 MG: Performed by: INTERNAL MEDICINE

## 2021-02-06 PROCEDURE — 25010000002 METHYLNALTREXONE 12 MG/0.6ML SOLUTION: Performed by: INTERNAL MEDICINE

## 2021-02-06 PROCEDURE — 25010000002 KETOROLAC TROMETHAMINE PER 15 MG: Performed by: NURSE PRACTITIONER

## 2021-02-06 PROCEDURE — 94660 CPAP INITIATION&MGMT: CPT

## 2021-02-06 PROCEDURE — 25010000002 DIAZEPAM PER 5 MG: Performed by: INTERNAL MEDICINE

## 2021-02-06 PROCEDURE — 85027 COMPLETE CBC AUTOMATED: CPT | Performed by: INTERNAL MEDICINE

## 2021-02-06 PROCEDURE — 99233 SBSQ HOSP IP/OBS HIGH 50: CPT | Performed by: INTERNAL MEDICINE

## 2021-02-06 PROCEDURE — 99232 SBSQ HOSP IP/OBS MODERATE 35: CPT | Performed by: INTERNAL MEDICINE

## 2021-02-06 PROCEDURE — 25010000003 MAGNESIUM SULFATE 4 GM/100ML SOLUTION: Performed by: NURSE PRACTITIONER

## 2021-02-06 PROCEDURE — 94799 UNLISTED PULMONARY SVC/PX: CPT

## 2021-02-06 PROCEDURE — 80048 BASIC METABOLIC PNL TOTAL CA: CPT | Performed by: INTERNAL MEDICINE

## 2021-02-06 RX ORDER — KETOROLAC TROMETHAMINE 30 MG/ML
30 INJECTION, SOLUTION INTRAMUSCULAR; INTRAVENOUS ONCE
Status: COMPLETED | OUTPATIENT
Start: 2021-02-06 | End: 2021-02-06

## 2021-02-06 RX ORDER — FUROSEMIDE 10 MG/ML
40 INJECTION INTRAMUSCULAR; INTRAVENOUS ONCE
Status: COMPLETED | OUTPATIENT
Start: 2021-02-06 | End: 2021-02-06

## 2021-02-06 RX ADMIN — FUROSEMIDE 40 MG: 10 INJECTION INTRAMUSCULAR; INTRAVENOUS at 12:39

## 2021-02-06 RX ADMIN — ENOXAPARIN SODIUM 40 MG: 40 INJECTION SUBCUTANEOUS at 20:41

## 2021-02-06 RX ADMIN — DIAZEPAM 5 MG: 5 INJECTION, SOLUTION INTRAMUSCULAR; INTRAVENOUS at 05:00

## 2021-02-06 RX ADMIN — DIAZEPAM 10 MG: 5 INJECTION, SOLUTION INTRAMUSCULAR; INTRAVENOUS at 20:42

## 2021-02-06 RX ADMIN — IPRATROPIUM BROMIDE AND ALBUTEROL SULFATE 3 ML: 2.5; .5 SOLUTION RESPIRATORY (INHALATION) at 17:11

## 2021-02-06 RX ADMIN — IPRATROPIUM BROMIDE AND ALBUTEROL SULFATE 3 ML: 2.5; .5 SOLUTION RESPIRATORY (INHALATION) at 23:09

## 2021-02-06 RX ADMIN — METOPROLOL TARTRATE 25 MG: 25 TABLET, FILM COATED ORAL at 10:03

## 2021-02-06 RX ADMIN — BISACODYL 10 MG: 10 SUPPOSITORY RECTAL at 10:03

## 2021-02-06 RX ADMIN — PANTOPRAZOLE SODIUM 40 MG: 40 INJECTION, POWDER, LYOPHILIZED, FOR SOLUTION INTRAVENOUS at 05:00

## 2021-02-06 RX ADMIN — DIAZEPAM 5 MG: 5 INJECTION, SOLUTION INTRAMUSCULAR; INTRAVENOUS at 12:18

## 2021-02-06 RX ADMIN — CLONIDINE HYDROCHLORIDE 0.1 MG: 0.1 TABLET ORAL at 10:03

## 2021-02-06 RX ADMIN — FOLIC ACID 1 MG: 1 TABLET ORAL at 10:03

## 2021-02-06 RX ADMIN — QUETIAPINE FUMARATE 100 MG: 100 TABLET ORAL at 13:59

## 2021-02-06 RX ADMIN — SODIUM CHLORIDE, PRESERVATIVE FREE 10 ML: 5 INJECTION INTRAVENOUS at 10:04

## 2021-02-06 RX ADMIN — IPRATROPIUM BROMIDE AND ALBUTEROL SULFATE 3 ML: 2.5; .5 SOLUTION RESPIRATORY (INHALATION) at 07:37

## 2021-02-06 RX ADMIN — DEXMEDETOMIDINE 0.7 MCG/KG/HR: 100 INJECTION, SOLUTION, CONCENTRATE INTRAVENOUS at 01:12

## 2021-02-06 RX ADMIN — QUETIAPINE FUMARATE 100 MG: 100 TABLET ORAL at 23:34

## 2021-02-06 RX ADMIN — KETOROLAC TROMETHAMINE 30 MG: 30 INJECTION, SOLUTION INTRAMUSCULAR; INTRAVENOUS at 18:32

## 2021-02-06 RX ADMIN — SODIUM CHLORIDE, PRESERVATIVE FREE 10 ML: 5 INJECTION INTRAVENOUS at 20:42

## 2021-02-06 RX ADMIN — LORAZEPAM 2 MG: 2 INJECTION INTRAMUSCULAR; INTRAVENOUS at 04:00

## 2021-02-06 RX ADMIN — CLONIDINE HYDROCHLORIDE 0.1 MG: 0.1 TABLET ORAL at 20:41

## 2021-02-06 RX ADMIN — ENOXAPARIN SODIUM 40 MG: 40 INJECTION SUBCUTANEOUS at 10:04

## 2021-02-06 RX ADMIN — METOPROLOL TARTRATE 25 MG: 25 TABLET, FILM COATED ORAL at 20:41

## 2021-02-06 RX ADMIN — OXYCODONE HYDROCHLORIDE 10 MG: 5 TABLET ORAL at 12:18

## 2021-02-06 RX ADMIN — METHYLNALTREXONE BROMIDE 12 MG: 12 INJECTION, SOLUTION SUBCUTANEOUS at 10:04

## 2021-02-06 RX ADMIN — IPRATROPIUM BROMIDE AND ALBUTEROL SULFATE 3 ML: 2.5; .5 SOLUTION RESPIRATORY (INHALATION) at 04:08

## 2021-02-06 RX ADMIN — OXYCODONE HYDROCHLORIDE 10 MG: 5 TABLET ORAL at 05:00

## 2021-02-06 RX ADMIN — SENNOSIDES 10 ML: 8.8 LIQUID ORAL at 20:41

## 2021-02-06 RX ADMIN — THIAMINE HCL TAB 100 MG 100 MG: 100 TAB at 10:03

## 2021-02-06 RX ADMIN — OXYCODONE HYDROCHLORIDE 10 MG: 5 TABLET ORAL at 17:17

## 2021-02-06 RX ADMIN — QUETIAPINE FUMARATE 100 MG: 100 TABLET ORAL at 05:00

## 2021-02-06 RX ADMIN — MAGNESIUM SULFATE 4 G: 4 INJECTION INTRAVENOUS at 04:53

## 2021-02-06 RX ADMIN — ESCITALOPRAM OXALATE 20 MG: 20 TABLET ORAL at 10:03

## 2021-02-06 RX ADMIN — DIAZEPAM 5 MG: 5 INJECTION, SOLUTION INTRAMUSCULAR; INTRAVENOUS at 17:17

## 2021-02-06 NOTE — PROGRESS NOTES
"GI Daily Progress Note  Subjective     Debi Rivas is a 46 y.o. male who was admitted with Acute pancreatitis.   Pulled out Keofeed again last night.  Had large BM last night.  Has low grade fever    Chief Complaint:  abd pain    Objective     /82   Pulse 96   Temp 98.1 °F (36.7 °C) (Bladder)   Resp (!) 37   Ht 188 cm (74.02\")   Wt (!) 206 kg (454 lb 14.4 oz) Comment: per bed scale   SpO2 96%   BMI 58.38 kg/m²     Intake/Output last 3 shifts:  I/O last 3 completed shifts:  In: 6267 [I.V.:1750.4; Other:1781; NG/GT:2577; IV Piggyback:158.6]  Out: 6585 [Urine:5535; Emesis/NG output:1050]  Intake/Output this shift:  I/O this shift:  In: 561 [Other:146; NG/GT:415]  Out: 1350 [Urine:1200; Emesis/NG output:150]      Physical Exam  Wt Readings from Last 3 Encounters:   02/01/21 (!) 206 kg (454 lb 14.4 oz)   06/10/20 (!) 152 kg (335 lb)   08/07/19 (!) 144 kg (318 lb)   ,body mass index is 58.38 kg/m².,@FLOWAMB(6)@,@FLOWAMB(5)@,@FLOWAMB(8)@   CONSTITUTIONAL:lying in bed in restraint.  On BiPap. sedated  Resp CTA; no rhonchi, rales, or wheezes.  Respiration effort normal  CV RRR; no M/R/G. No lower extremity edema  GI Abd soft, NT, ND, normal active bowel sounds.  Morbid obese  Psych: sedated    DATA:Results for DEBI RIVAS (MRN [id number]) as of 2/6/2021 08:41   Ref. Range 2/6/2021 03:10   Glucose Latest Ref Range: 65 - 99 mg/dL 139 (H)   Sodium Latest Ref Range: 136 - 145 mmol/L 139   Potassium Latest Ref Range: 3.5 - 5.2 mmol/L 4.1   CO2 Latest Ref Range: 22.0 - 29.0 mmol/L 30.0 (H)   Chloride Latest Ref Range: 98 - 107 mmol/L 102   Anion Gap Latest Ref Range: 5.0 - 15.0 mmol/L 7.0   Creatinine Latest Ref Range: 0.76 - 1.27 mg/dL 0.51 (L)   BUN Latest Ref Range: 6 - 20 mg/dL 15   BUN/Creatinine Ratio Latest Ref Range: 7.0 - 25.0  29.4 (H)   Calcium Latest Ref Range: 8.6 - 10.5 mg/dL 8.9   eGFR African Am Latest Ref Range: >60 mL/min/1.73 >150   Phosphorus Latest Ref Range: 2.5 - 4.5 mg/dL 3.3   Magnesium " Latest Ref Range: 1.6 - 2.6 mg/dL 1.9   WBC Latest Ref Range: 3.40 - 10.80 10*3/mm3 8.39   RBC Latest Ref Range: 4.14 - 5.80 10*6/mm3 3.91 (L)   Hemoglobin Latest Ref Range: 13.0 - 17.7 g/dL 10.3 (L)   Hematocrit Latest Ref Range: 37.5 - 51.0 % 34.1 (L)       Assessment/Plan     Slowly improving   Gut function is improving.  Still with NG output  Will get CT tomorrow with IV and oral contrast              Acute pancreatitis. Alcoholic +/- hyperlipidemia induced    Alcohol abuse    Essential hypertension    GERD without esophagitis    Class 3 severe obesity in adult     LIANA    Alcohol withdrawal     Hypertriglyceridemia    Fatty liver    Acute hypoxemic respiratory failure requiring NIV. ICU transfer 1/22/2021 (CMS/HCC)    Fever and increased procalcitonin. ? source       LOS: 18 days     Vipin Holliday MD  02/06/21  08:40 EST

## 2021-02-06 NOTE — PLAN OF CARE
Goal Outcome Evaluation:  Plan of Care Reviewed With: patient  Progress: no change  Outcome Summary: Rested most of shift. Pt was A&Ox4 around 0330, able to carry on logical conversation for approx 30 min during bath. After bath complete, pt became restless, stated he could not catch his breath, placed back on Bipap and continued restless behavior, pulling at restraints, moaning/yelling out. Precedex increased at that time, currently infusing at 0.9mcg/kg/hr. T max 100.6, UOP 2275, no BM, NG 350ml out. Mag replaced this am per protocol.

## 2021-02-06 NOTE — PROGRESS NOTES
INTENSIVIST   PROGRESS NOTE     Hospital:  LOS: 18 days     Mr. Titus Bro, 46 y.o. male is followed for a Chief Complaint of: Encephalopathy      Subjective   S     Interval History:  No acute events overnight. On less Precedex this AM.        The patient's relevant past medical, surgical and social history were reviewed and updated in Epic as appropriate.      ROS: Unable to obtain secondary to mental status.     Objective   O     Vitals:  Temp  Min: 98.1 °F (36.7 °C)  Max: 100.6 °F (38.1 °C)  BP  Min: 118/65  Max: 170/93  Pulse  Min: 75  Max: 107  Resp  Min: 24  Max: 38  SpO2  Min: 89 %  Max: 99 % Flow (L/min)  Min: 2  Max: 3    Intake/Ouptut 24 hrs (7:00AM - 6:59 AM)  Intake & Output (last 3 days)       02/03 0701 - 02/04 0700 02/04 0701 - 02/05 0700 02/05 0701 - 02/06 0700 02/06 0701 - 02/07 0700    I.V. (mL/kg) 2176 (10.6) 1408 (6.8) 913.4 (4.4)     Other 772 1098 1149 146    NG/GT 1472 1923 1492 415    IV Piggyback 100 170 158.6     Total Intake(mL/kg) 4520 (21.9) 4599 (22.3) 3713 (18) 561 (2.7)    Urine (mL/kg/hr) 3405 (0.7) 4300 (0.9) 4185 (0.8) 1200 (1.7)    Emesis/NG output 900 600 650 150    Stool 0 0      Total Output 4305 4900 4835 1350    Net +215 -301 -1122 -789            Stool Unmeasured Occurrence 1 x 2 x            Medications (drips):  dexmedetomidine, Last Rate: 0.2 mcg/kg/hr (02/06/21 1000)          Physical Examination  Telemetry:  Normal sinus rhythm.    Constitutional:  No acute distress.  Resting in bed on Bipap.    Eyes: No scleral icterus.   PERRL, EOM intact.    Neck:  Supple, FROM   Cardiovascular: Normal rate, regular and rhythm. Normal heart sounds.  No murmurs, gallop or rub.   Respiratory: No respiratory distress. Normal respiratory effort.  Coarse rhonchi bilaterally.    Abdominal:  Soft. No masses. Nontender. No distension. No HSM.   Extremities: No digital cyanosis. No clubbing.  1+ peripheral edema.   Skin: No rashes, lesions or ulcers   Neurological:   Awakens to  stimulation. Speech is garbled.               Results from last 7 days   Lab Units 02/06/21  0310 02/05/21  0424 02/04/21  0336   WBC 10*3/mm3 8.39 8.02 9.00   HEMOGLOBIN g/dL 10.3* 10.4* 10.6*   MCV fL 87.2 89.6 90.4   PLATELETS 10*3/mm3 687* 682* 657*     Results from last 7 days   Lab Units 02/06/21  0310 02/05/21  0424 02/04/21  0336   SODIUM mmol/L 139 143 144   POTASSIUM mmol/L 4.1 3.9 3.8   CO2 mmol/L 30.0* 32.0* 33.0*   CREATININE mg/dL 0.51* 0.53* 0.66*   GLUCOSE mg/dL 139* 126* 129*   MAGNESIUM mg/dL 1.9 1.8 2.1   PHOSPHORUS mg/dL 3.3 4.0 3.5     Estimated Creatinine Clearance: 337.9 mL/min (A) (by C-G formula based on SCr of 0.51 mg/dL (L)).  Results from last 7 days   Lab Units 02/05/21  0424 02/03/21  0352 02/01/21  0339   ALK PHOS U/L 62 73 83   BILIRUBIN mg/dL 0.2 0.3 0.3   ALT (SGPT) U/L 16 21 22   AST (SGOT) U/L 22 38 60*       Results from last 7 days   Lab Units 02/01/21  0140   PH, ARTERIAL pH units 7.422   PCO2, ARTERIAL mm Hg 49.4*   PO2 ART mm Hg 79.9*   FIO2 % 33       Images:    Imaging Results (Last 24 Hours)     Procedure Component Value Units Date/Time    XR Abdomen KUB [[id number]] Collected: 02/05/21 1302     Updated: 02/05/21 2258    Narrative:      EXAMINATION: XR ABDOMEN KUB-     INDICATION: Keofeed placement; K85.90-Acute pancreatitis without  necrosis or infection, unspecified.     COMPARISON: 02/03/2021.     FINDINGS: NG tube is seen in the mid stomach. Feeding tube is in the  proximal stomach. There is a moderate amount of colon gas present and a  single mildly distended small bowel loop, nonspecific.       Impression:      1. NG tube in the mid stomach.  2. Feeding tube in the proximal stomach.      D:  02/05/2021  E:  02/05/2021     This report was finalized on 2/5/2021 10:55 PM by Dr. Narendra Colon MD.       XR Abdomen KUB [[id number]] Collected: 02/05/21 1325     Updated: 02/05/21 2258    Narrative:      EXAMINATION: XR ABDOMEN/KUB-02/05/2021:     INDICATION: Keofeed placement;  K85.90-Acute pancreatitis without  necrosis or infection, unspecified.     COMPARISON: 12/05/2021.     FINDINGS: Feeding tube is seen at the junction of the second and third  portions of the duodenum. The bowel gas pattern is nonobstructive and  similar to the prior exam. NG tube remains in the mid stomach.       Impression:      Feeding tube has passed to the mid duodenum.     D:  02/05/2021  E:  02/05/2021            This report was finalized on 2/5/2021 10:55 PM by Dr. Narendra Colon MD.             Results: Reviewed.  I reviewed the patient's new laboratory and imaging results.  I independently reviewed the patient's new images.    Medications: Reviewed.    Assessment/Plan   A / P     Mr. Bro is a 47yo M with a history of ETOH abuse who was admitted on 1/19/21 with pancreatitis. He was admitted to Hospital Medicine but developed ETOH withdrawal necessitating transfer to the ICU on 1/22/21. He was placed on a Precedex drip and given IV Valium. He continued to require intermittent doses of Ativan despite this. He ultimately required intubation with mechanical ventilation on 1/24/21. A repeat CT of the abdomen/pelvis was performed on 1/27/21 due to worsening fevers and showed some possible necrosis in the head of the pancreas and worsening peripancreatic fluid collections. GI is following. He was extubated on 1/31/21. Respiratory status remains marginal on/off Bipap. He continues to have periods of extreme agitation.      Nutrition:   NPO Diet  Advance Directives:   Code Status and Medical Interventions:   Ordered at: 01/19/21 1408     Level Of Support Discussed With:    Patient     Code Status:    CPR     Medical Interventions (Level of Support Prior to Arrest):    Full       Active Hospital Problems    Diagnosis   • **Acute pancreatitis. Alcoholic +/- hyperlipidemia induced   • Alcohol withdrawal    • Hypertriglyceridemia   • Fatty liver   • Acute hypoxemic respiratory failure requiring NIV. ICU transfer 1/22/2021  (CMS/HCC)   • Fever and increased procalcitonin. ? source   • Class 3 severe obesity in adult    • LIANA   • Alcohol abuse   • Essential hypertension   • GERD without esophagitis       Assessment / Plan:    Continue to monitor in the ICU. High risk for reintubation.  NT suctioning as needed.   Continue Seroquel and Valium. Wean Precedex.    Continue tube feeds.  Decrease free water to 30cc/hr.   Continue thiamine  Replace magnesium  Bowel regimen  Lasix 40mg IV x 1 again today.   AM labs    High risk for clinical decline.     High level of risk due to:  severe exacerbation of chronic illness and illness with threat to life or bodily function.    Plan of care and goals reviewed during interdisciplinary rounds.  I discussed the patient's findings and my recommendations with patient and nursing staff      Jeanie Page, DO    Intensive Care Medicine and Pulmonary Medicine

## 2021-02-07 ENCOUNTER — APPOINTMENT (OUTPATIENT)
Dept: CT IMAGING | Facility: HOSPITAL | Age: 46
End: 2021-02-07

## 2021-02-07 LAB
ANION GAP SERPL CALCULATED.3IONS-SCNC: 10 MMOL/L (ref 5–15)
BUN SERPL-MCNC: 17 MG/DL (ref 6–20)
BUN/CREAT SERPL: 27 (ref 7–25)
CALCIUM SPEC-SCNC: 9.1 MG/DL (ref 8.6–10.5)
CHLORIDE SERPL-SCNC: 99 MMOL/L (ref 98–107)
CO2 SERPL-SCNC: 31 MMOL/L (ref 22–29)
CREAT SERPL-MCNC: 0.63 MG/DL (ref 0.76–1.27)
DEPRECATED RDW RBC AUTO: 44.8 FL (ref 37–54)
ERYTHROCYTE [DISTWIDTH] IN BLOOD BY AUTOMATED COUNT: 14.1 % (ref 12.3–15.4)
GFR SERPL CREATININE-BSD FRML MDRD: >150 ML/MIN/1.73
GLUCOSE SERPL-MCNC: 103 MG/DL (ref 65–99)
HCT VFR BLD AUTO: 33.3 % (ref 37.5–51)
HGB BLD-MCNC: 10.1 G/DL (ref 13–17.7)
MAGNESIUM SERPL-MCNC: 1.9 MG/DL (ref 1.6–2.6)
MCH RBC QN AUTO: 26.2 PG (ref 26.6–33)
MCHC RBC AUTO-ENTMCNC: 30.3 G/DL (ref 31.5–35.7)
MCV RBC AUTO: 86.3 FL (ref 79–97)
PHOSPHATE SERPL-MCNC: 3.5 MG/DL (ref 2.5–4.5)
PLATELET # BLD AUTO: 737 10*3/MM3 (ref 140–450)
PMV BLD AUTO: 10.8 FL (ref 6–12)
POTASSIUM SERPL-SCNC: 3.9 MMOL/L (ref 3.5–5.2)
RBC # BLD AUTO: 3.86 10*6/MM3 (ref 4.14–5.8)
SODIUM SERPL-SCNC: 140 MMOL/L (ref 136–145)
WBC # BLD AUTO: 9.62 10*3/MM3 (ref 3.4–10.8)

## 2021-02-07 PROCEDURE — 25010000003 MAGNESIUM SULFATE 4 GM/100ML SOLUTION: Performed by: NURSE PRACTITIONER

## 2021-02-07 PROCEDURE — 84100 ASSAY OF PHOSPHORUS: CPT | Performed by: INTERNAL MEDICINE

## 2021-02-07 PROCEDURE — 94799 UNLISTED PULMONARY SVC/PX: CPT

## 2021-02-07 PROCEDURE — 80048 BASIC METABOLIC PNL TOTAL CA: CPT | Performed by: INTERNAL MEDICINE

## 2021-02-07 PROCEDURE — 25010000002 DIAZEPAM PER 5 MG: Performed by: INTERNAL MEDICINE

## 2021-02-07 PROCEDURE — 83735 ASSAY OF MAGNESIUM: CPT | Performed by: INTERNAL MEDICINE

## 2021-02-07 PROCEDURE — 25010000002 IOPAMIDOL 61 % SOLUTION: Performed by: INTERNAL MEDICINE

## 2021-02-07 PROCEDURE — 25010000002 FUROSEMIDE PER 20 MG: Performed by: INTERNAL MEDICINE

## 2021-02-07 PROCEDURE — 0 DIATRIZOATE MEGLUMINE & SODIUM PER 1 ML

## 2021-02-07 PROCEDURE — 25010000002 LORAZEPAM PER 2 MG: Performed by: INTERNAL MEDICINE

## 2021-02-07 PROCEDURE — 85027 COMPLETE CBC AUTOMATED: CPT | Performed by: INTERNAL MEDICINE

## 2021-02-07 PROCEDURE — 25010000002 ENOXAPARIN PER 10 MG: Performed by: FAMILY MEDICINE

## 2021-02-07 PROCEDURE — 99232 SBSQ HOSP IP/OBS MODERATE 35: CPT | Performed by: INTERNAL MEDICINE

## 2021-02-07 PROCEDURE — 74178 CT ABD&PLV WO CNTR FLWD CNTR: CPT

## 2021-02-07 RX ORDER — FUROSEMIDE 10 MG/ML
40 INJECTION INTRAMUSCULAR; INTRAVENOUS ONCE
Status: COMPLETED | OUTPATIENT
Start: 2021-02-07 | End: 2021-02-07

## 2021-02-07 RX ADMIN — ESCITALOPRAM OXALATE 20 MG: 20 TABLET ORAL at 08:32

## 2021-02-07 RX ADMIN — SIMETHICONE 120 MG: 20 EMULSION ORAL at 05:41

## 2021-02-07 RX ADMIN — PANTOPRAZOLE SODIUM 40 MG: 40 INJECTION, POWDER, LYOPHILIZED, FOR SOLUTION INTRAVENOUS at 05:40

## 2021-02-07 RX ADMIN — IPRATROPIUM BROMIDE AND ALBUTEROL SULFATE 3 ML: 2.5; .5 SOLUTION RESPIRATORY (INHALATION) at 15:24

## 2021-02-07 RX ADMIN — QUETIAPINE FUMARATE 100 MG: 100 TABLET ORAL at 13:29

## 2021-02-07 RX ADMIN — IPRATROPIUM BROMIDE AND ALBUTEROL SULFATE 3 ML: 2.5; .5 SOLUTION RESPIRATORY (INHALATION) at 03:34

## 2021-02-07 RX ADMIN — DIAZEPAM 10 MG: 5 INJECTION, SOLUTION INTRAMUSCULAR; INTRAVENOUS at 20:11

## 2021-02-07 RX ADMIN — IPRATROPIUM BROMIDE AND ALBUTEROL SULFATE 3 ML: 2.5; .5 SOLUTION RESPIRATORY (INHALATION) at 07:54

## 2021-02-07 RX ADMIN — LORAZEPAM 2 MG: 2 INJECTION INTRAMUSCULAR; INTRAVENOUS at 02:37

## 2021-02-07 RX ADMIN — QUETIAPINE FUMARATE 100 MG: 100 TABLET ORAL at 05:59

## 2021-02-07 RX ADMIN — ACETAMINOPHEN ORAL SOLUTION 650 MG: 650 SOLUTION ORAL at 05:58

## 2021-02-07 RX ADMIN — OXYCODONE HYDROCHLORIDE 10 MG: 5 TABLET ORAL at 12:19

## 2021-02-07 RX ADMIN — SODIUM CHLORIDE, PRESERVATIVE FREE 10 ML: 5 INJECTION INTRAVENOUS at 20:12

## 2021-02-07 RX ADMIN — SODIUM CHLORIDE, PRESERVATIVE FREE 10 ML: 5 INJECTION INTRAVENOUS at 08:32

## 2021-02-07 RX ADMIN — OXYCODONE HYDROCHLORIDE 10 MG: 5 TABLET ORAL at 05:40

## 2021-02-07 RX ADMIN — BISACODYL 10 MG: 10 SUPPOSITORY RECTAL at 04:36

## 2021-02-07 RX ADMIN — OXYCODONE HYDROCHLORIDE 10 MG: 5 TABLET ORAL at 17:22

## 2021-02-07 RX ADMIN — QUETIAPINE FUMARATE 100 MG: 100 TABLET ORAL at 23:06

## 2021-02-07 RX ADMIN — DIAZEPAM 5 MG: 5 INJECTION, SOLUTION INTRAMUSCULAR; INTRAVENOUS at 17:22

## 2021-02-07 RX ADMIN — DIAZEPAM 5 MG: 5 INJECTION, SOLUTION INTRAMUSCULAR; INTRAVENOUS at 05:40

## 2021-02-07 RX ADMIN — ENOXAPARIN SODIUM 40 MG: 40 INJECTION SUBCUTANEOUS at 08:32

## 2021-02-07 RX ADMIN — MAGNESIUM SULFATE 4 G: 4 INJECTION INTRAVENOUS at 05:58

## 2021-02-07 RX ADMIN — IPRATROPIUM BROMIDE AND ALBUTEROL SULFATE 3 ML: 2.5; .5 SOLUTION RESPIRATORY (INHALATION) at 23:09

## 2021-02-07 RX ADMIN — DIATRIZOATE MEGLUMINE AND DIATRIZOATE SODIUM 15 ML: 660; 100 LIQUID ORAL; RECTAL at 10:34

## 2021-02-07 RX ADMIN — OXYCODONE HYDROCHLORIDE 10 MG: 5 TABLET ORAL at 00:08

## 2021-02-07 RX ADMIN — FUROSEMIDE 40 MG: 10 INJECTION INTRAMUSCULAR; INTRAVENOUS at 10:34

## 2021-02-07 RX ADMIN — DIAZEPAM 5 MG: 5 INJECTION, SOLUTION INTRAMUSCULAR; INTRAVENOUS at 12:48

## 2021-02-07 RX ADMIN — BISACODYL 10 MG: 10 SUPPOSITORY RECTAL at 08:32

## 2021-02-07 RX ADMIN — IPRATROPIUM BROMIDE AND ALBUTEROL SULFATE 3 ML: 2.5; .5 SOLUTION RESPIRATORY (INHALATION) at 19:07

## 2021-02-07 RX ADMIN — IPRATROPIUM BROMIDE AND ALBUTEROL SULFATE 3 ML: 2.5; .5 SOLUTION RESPIRATORY (INHALATION) at 11:25

## 2021-02-07 RX ADMIN — IOPAMIDOL 100 ML: 612 INJECTION, SOLUTION INTRAVENOUS at 11:54

## 2021-02-07 RX ADMIN — CLONIDINE HYDROCHLORIDE 0.1 MG: 0.1 TABLET ORAL at 08:32

## 2021-02-07 RX ADMIN — SENNOSIDES 10 ML: 8.8 LIQUID ORAL at 20:11

## 2021-02-07 RX ADMIN — METOPROLOL TARTRATE 25 MG: 25 TABLET, FILM COATED ORAL at 20:10

## 2021-02-07 RX ADMIN — THIAMINE HCL TAB 100 MG 100 MG: 100 TAB at 08:32

## 2021-02-07 RX ADMIN — METOPROLOL TARTRATE 25 MG: 25 TABLET, FILM COATED ORAL at 08:32

## 2021-02-07 RX ADMIN — LORAZEPAM 2 MG: 2 INJECTION INTRAMUSCULAR; INTRAVENOUS at 23:35

## 2021-02-07 RX ADMIN — ENOXAPARIN SODIUM 40 MG: 40 INJECTION SUBCUTANEOUS at 20:12

## 2021-02-07 RX ADMIN — FOLIC ACID 1 MG: 1 TABLET ORAL at 08:32

## 2021-02-07 RX ADMIN — CLONIDINE HYDROCHLORIDE 0.1 MG: 0.1 TABLET ORAL at 20:10

## 2021-02-07 NOTE — PLAN OF CARE
Goal Outcome Evaluation:  Plan of Care Reviewed With: patient  Progress: improving  Outcome Summary: PT now alert and oriented and has tolerated being out of restraints since 2200. Multiple complaints of gas pain and needing to have BM. Simethicone drops given along with suppository. Pt ended up having a large BM this am. NG output- 450 ml. Placed on BiPAP for a few hours last night, could not tolerate for long due to having to get on and off bedpan multiple times. Now on 3L NC. Pt was able to talk to daughter via phone last night. Will continue to monitor closely.

## 2021-02-07 NOTE — PLAN OF CARE
Goal Outcome Evaluation:  Plan of Care Reviewed With: patient  Progress: improving     Pt remains mostly oriented today with restlessness at times. CT abd with contrast again today. Inflammation still persist. BM x 2 today. 600 ml out of NGT. Pt requesting something to drink. Temp 99 today. Replaced mag. Pt talked to daughters on the phone.

## 2021-02-07 NOTE — PLAN OF CARE
Goal Outcome Evaluation:  Plan of Care Reviewed With: patient  Progress: improving     Pt very lethargic this am. Weaned precedex gtt off. Pt more alert and understandable speech. Pt ox4 at times. Restless at times but manageable without precedex or PRN's meds given. Large BM x 2 today with other episodes of passing gas. 500ml out of NGT. Pt requesting liquids now. Ice chips given at times and tolerated well. Vocal quality more clear today. Pt cooperative and helping turn self in bed. 4L urine out today. 40 lasix given. Acute headache and increasing abd pain this evening not relieved with scheduled oxycodone. One time 30mg toradol IV given per order with relief of pain. Updated daughter Roxanne today.

## 2021-02-07 NOTE — PROGRESS NOTES
INTENSIVIST   PROGRESS NOTE     Hospital:  LOS: 19 days     Mr. Titus Bro, 46 y.o. male is followed for a Chief Complaint of: Encephalopathy      Subjective   S     Interval History:  No acute events overnight. Off Precedex since yesterday. One bowel movement yesterday.        The patient's relevant past medical, surgical and social history were reviewed and updated in Epic as appropriate.      ROS:   Constitutional: Negative for fever.   Respiratory: Negative for dyspnea.   Cardiovascular: Negative for chest pain.   Gastrointestinal: Negative for  nausea, vomiting and diarrhea.       Objective   O     Vitals:  Temp  Min: 99.3 °F (37.4 °C)  Max: 100.8 °F (38.2 °C)  BP  Min: 126/73  Max: 179/82  Pulse  Min: 89  Max: 116  Resp  Min: 20  Max: 36  SpO2  Min: 88 %  Max: 99 % Flow (L/min)  Min: 3  Max: 4    Intake/Ouptut 24 hrs (7:00AM - 6:59 AM)  Intake & Output (last 3 days)       02/04 0701 - 02/05 0700 02/05 0701 - 02/06 0700 02/06 0701 - 02/07 0700 02/07 0701 - 02/08 0700    I.V. (mL/kg) 1408 (6.8) 913.4 (4.4) 323 (1.6)     Other 1098 1149 469 180    NG/GT 1923 1492 1588 689    IV Piggyback 170 158.6      Total Intake(mL/kg) 4599 (22.3) 3713 (18) 2380 (11.6) 869 (4.2)    Urine (mL/kg/hr) 4300 (0.9) 4185 (0.8) 4800 (1) 225 (0.4)    Emesis/NG output 600 650 950     Stool 0  0     Total Output 4900 4835 5750 225    Net -301 -1122 -3370 +644            Stool Unmeasured Occurrence 2 x  2 x           Medications (drips):  dexmedetomidine, Last Rate: Stopped (02/06/21 1200)          Physical Examination  Telemetry:  Normal sinus rhythm.    Constitutional:  No acute distress.  Resting in bed on 2L NC.    Eyes: No scleral icterus.   PERRL, EOM intact.    Neck:  Supple, FROM   Cardiovascular: Normal rate, regular and rhythm. Normal heart sounds.  No murmurs, gallop or rub.   Respiratory: No respiratory distress. Normal respiratory effort.  Coarse rhonchi bilaterally.    Abdominal:  Soft. No masses. Nontender. No distension.  No HSM.   Extremities: No digital cyanosis. No clubbing.  1+ peripheral edema.   Skin: No rashes, lesions or ulcers   Neurological:   Alert and interactive.               Results from last 7 days   Lab Units 02/07/21 0319 02/06/21  0310 02/05/21  0424   WBC 10*3/mm3 9.62 8.39 8.02   HEMOGLOBIN g/dL 10.1* 10.3* 10.4*   MCV fL 86.3 87.2 89.6   PLATELETS 10*3/mm3 737* 687* 682*     Results from last 7 days   Lab Units 02/07/21  0319 02/06/21  0310 02/05/21  0424   SODIUM mmol/L 140 139 143   POTASSIUM mmol/L 3.9 4.1 3.9   CO2 mmol/L 31.0* 30.0* 32.0*   CREATININE mg/dL 0.63* 0.51* 0.53*   GLUCOSE mg/dL 103* 139* 126*   MAGNESIUM mg/dL 1.9 1.9 1.8   PHOSPHORUS mg/dL 3.5 3.3 4.0     Estimated Creatinine Clearance: 273.5 mL/min (A) (by C-G formula based on SCr of 0.63 mg/dL (L)).  Results from last 7 days   Lab Units 02/05/21  0424 02/03/21  0352 02/01/21  0339   ALK PHOS U/L 62 73 83   BILIRUBIN mg/dL 0.2 0.3 0.3   ALT (SGPT) U/L 16 21 22   AST (SGOT) U/L 22 38 60*       Results from last 7 days   Lab Units 02/01/21  0140   PH, ARTERIAL pH units 7.422   PCO2, ARTERIAL mm Hg 49.4*   PO2 ART mm Hg 79.9*   FIO2 % 33       Images:    Imaging Results (Last 24 Hours)     ** No results found for the last 24 hours. **          Results: Reviewed.  I reviewed the patient's new laboratory and imaging results.  I independently reviewed the patient's new images.    Medications: Reviewed.    Assessment/Plan   A / P     Mr. Bro is a 45yo M with a history of ETOH abuse who was admitted on 1/19/21 with pancreatitis. He was admitted to Hospital Medicine but developed ETOH withdrawal necessitating transfer to the ICU on 1/22/21. He was placed on a Precedex drip and given IV Valium. He continued to require intermittent doses of Ativan despite this. He ultimately required intubation with mechanical ventilation on 1/24/21. A repeat CT of the abdomen/pelvis was performed on 1/27/21 due to worsening fevers and showed some possible necrosis  in the head of the pancreas and worsening peripancreatic fluid collections. GI is following. He was extubated on 1/31/21. Precedex was finally stopped on 2/6/21.     Nutrition:   NPO Diet  Advance Directives:   Code Status and Medical Interventions:   Ordered at: 01/19/21 1408     Level Of Support Discussed With:    Patient     Code Status:    CPR     Medical Interventions (Level of Support Prior to Arrest):    Full       Active Hospital Problems    Diagnosis   • **Acute pancreatitis. Alcoholic +/- hyperlipidemia induced   • Alcohol withdrawal    • Hypertriglyceridemia   • Fatty liver   • Acute hypoxemic respiratory failure requiring NIV. ICU transfer 1/22/2021 (CMS/[facility])   • Fever and increased procalcitonin. ? source   • Class 3 severe obesity in adult    • LIANA   • Alcohol abuse   • Essential hypertension   • GERD without esophagitis       Assessment / Plan:    1. Continue to monitor in the ICU.   2. NT suctioning as needed.   3. Continue Seroquel and Valium.     4. Continue tube feeds and free water.   5. Speech evaluation.  6. CT abdomen/pelvis today per GI.   7. Continue thiamine  8. Replace magnesium  9. Bowel regimen  10. Lasix 40mg IV x 1 again today.   11. AM labs      High level of risk due to:  severe exacerbation of chronic illness and illness with threat to life or bodily function.    Plan of care and goals reviewed during interdisciplinary rounds.  I discussed the patient's findings and my recommendations with patient and nursing staff      Jeanie Page, DO    Intensive Care Medicine and Pulmonary Medicine

## 2021-02-08 LAB
ANION GAP SERPL CALCULATED.3IONS-SCNC: 9 MMOL/L (ref 5–15)
ARTERIAL PATENCY WRIST A: ABNORMAL
ATMOSPHERIC PRESS: ABNORMAL MM[HG]
BASE EXCESS BLDA CALC-SCNC: 9.4 MMOL/L (ref 0–2)
BDY SITE: ABNORMAL
BODY TEMPERATURE: 37 C
BUN SERPL-MCNC: 14 MG/DL (ref 6–20)
BUN/CREAT SERPL: 25.5 (ref 7–25)
CALCIUM SPEC-SCNC: 9.1 MG/DL (ref 8.6–10.5)
CHLORIDE SERPL-SCNC: 99 MMOL/L (ref 98–107)
CO2 BLDA-SCNC: 36.1 MMOL/L (ref 22–33)
CO2 SERPL-SCNC: 30 MMOL/L (ref 22–29)
COHGB MFR BLD: 0.9 % (ref 0–2)
CREAT SERPL-MCNC: 0.55 MG/DL (ref 0.76–1.27)
DEPRECATED RDW RBC AUTO: 44.1 FL (ref 37–54)
EPAP: 0
ERYTHROCYTE [DISTWIDTH] IN BLOOD BY AUTOMATED COUNT: 13.9 % (ref 12.3–15.4)
GFR SERPL CREATININE-BSD FRML MDRD: >150 ML/MIN/1.73
GLUCOSE SERPL-MCNC: 122 MG/DL (ref 65–99)
HCO3 BLDA-SCNC: 34.6 MMOL/L (ref 20–26)
HCT VFR BLD AUTO: 34.2 % (ref 37.5–51)
HCT VFR BLD CALC: 32.5 %
HGB BLD-MCNC: 10.3 G/DL (ref 13–17.7)
HGB BLDA-MCNC: 10.6 G/DL (ref 13.5–17.5)
INHALED O2 CONCENTRATION: 28 %
IPAP: 0
MAGNESIUM SERPL-MCNC: 1.9 MG/DL (ref 1.6–2.6)
MCH RBC QN AUTO: 26.2 PG (ref 26.6–33)
MCHC RBC AUTO-ENTMCNC: 30.1 G/DL (ref 31.5–35.7)
MCV RBC AUTO: 87 FL (ref 79–97)
METHGB BLD QL: 0.6 % (ref 0–1.5)
MODALITY: ABNORMAL
NOTE: ABNORMAL
OXYHGB MFR BLDV: 90.5 % (ref 94–99)
PAW @ PEAK INSP FLOW SETTING VENT: 0 CMH2O
PCO2 BLDA: 49.4 MM HG (ref 35–45)
PCO2 TEMP ADJ BLD: 49.4 MM HG (ref 35–48)
PH BLDA: 7.45 PH UNITS (ref 7.35–7.45)
PH, TEMP CORRECTED: 7.45 PH UNITS
PHOSPHATE SERPL-MCNC: 4.1 MG/DL (ref 2.5–4.5)
PLATELET # BLD AUTO: 676 10*3/MM3 (ref 140–450)
PMV BLD AUTO: 11.1 FL (ref 6–12)
PO2 BLDA: 62.5 MM HG (ref 83–108)
PO2 TEMP ADJ BLD: 62.5 MM HG (ref 83–108)
POTASSIUM SERPL-SCNC: 3.9 MMOL/L (ref 3.5–5.2)
RBC # BLD AUTO: 3.93 10*6/MM3 (ref 4.14–5.8)
SODIUM SERPL-SCNC: 138 MMOL/L (ref 136–145)
TOTAL RATE: 0 BREATHS/MINUTE
WBC # BLD AUTO: 9.51 10*3/MM3 (ref 3.4–10.8)

## 2021-02-08 PROCEDURE — 82805 BLOOD GASES W/O2 SATURATION: CPT

## 2021-02-08 PROCEDURE — 25010000002 METHYLNALTREXONE 12 MG/0.6ML SOLUTION: Performed by: INTERNAL MEDICINE

## 2021-02-08 PROCEDURE — 25010000002 DIAZEPAM PER 5 MG: Performed by: INTERNAL MEDICINE

## 2021-02-08 PROCEDURE — 83050 HGB METHEMOGLOBIN QUAN: CPT

## 2021-02-08 PROCEDURE — 80048 BASIC METABOLIC PNL TOTAL CA: CPT | Performed by: INTERNAL MEDICINE

## 2021-02-08 PROCEDURE — 94799 UNLISTED PULMONARY SVC/PX: CPT

## 2021-02-08 PROCEDURE — 85027 COMPLETE CBC AUTOMATED: CPT | Performed by: INTERNAL MEDICINE

## 2021-02-08 PROCEDURE — 84100 ASSAY OF PHOSPHORUS: CPT | Performed by: INTERNAL MEDICINE

## 2021-02-08 PROCEDURE — 25010000002 ENOXAPARIN PER 10 MG: Performed by: FAMILY MEDICINE

## 2021-02-08 PROCEDURE — 25010000002 FUROSEMIDE PER 20 MG: Performed by: INTERNAL MEDICINE

## 2021-02-08 PROCEDURE — 83735 ASSAY OF MAGNESIUM: CPT | Performed by: INTERNAL MEDICINE

## 2021-02-08 PROCEDURE — 25010000002 LORAZEPAM PER 2 MG: Performed by: INTERNAL MEDICINE

## 2021-02-08 PROCEDURE — 82375 ASSAY CARBOXYHB QUANT: CPT

## 2021-02-08 PROCEDURE — 36600 WITHDRAWAL OF ARTERIAL BLOOD: CPT

## 2021-02-08 PROCEDURE — 99233 SBSQ HOSP IP/OBS HIGH 50: CPT | Performed by: INTERNAL MEDICINE

## 2021-02-08 PROCEDURE — 25010000003 MAGNESIUM SULFATE 4 GM/100ML SOLUTION: Performed by: NURSE PRACTITIONER

## 2021-02-08 PROCEDURE — 25010000002 DIAZEPAM PER 5 MG: Performed by: PSYCHIATRY & NEUROLOGY

## 2021-02-08 RX ORDER — DIAZEPAM 5 MG/ML
5 INJECTION, SOLUTION INTRAMUSCULAR; INTRAVENOUS EVERY 4 HOURS PRN
Status: DISCONTINUED | OUTPATIENT
Start: 2021-02-08 | End: 2021-02-10

## 2021-02-08 RX ORDER — DIAZEPAM 5 MG/ML
10 INJECTION, SOLUTION INTRAMUSCULAR; INTRAVENOUS EVERY 6 HOURS
Status: DISCONTINUED | OUTPATIENT
Start: 2021-02-08 | End: 2021-02-10

## 2021-02-08 RX ORDER — FUROSEMIDE 10 MG/ML
40 INJECTION INTRAMUSCULAR; INTRAVENOUS ONCE
Status: COMPLETED | OUTPATIENT
Start: 2021-02-08 | End: 2021-02-08

## 2021-02-08 RX ORDER — OXYCODONE HYDROCHLORIDE 5 MG/1
5 TABLET ORAL EVERY 6 HOURS
Status: DISCONTINUED | OUTPATIENT
Start: 2021-02-08 | End: 2021-02-09

## 2021-02-08 RX ADMIN — OXYCODONE HYDROCHLORIDE 10 MG: 5 TABLET ORAL at 06:18

## 2021-02-08 RX ADMIN — OXYCODONE HYDROCHLORIDE 10 MG: 5 TABLET ORAL at 01:48

## 2021-02-08 RX ADMIN — IPRATROPIUM BROMIDE AND ALBUTEROL SULFATE 3 ML: 2.5; .5 SOLUTION RESPIRATORY (INHALATION) at 02:37

## 2021-02-08 RX ADMIN — IPRATROPIUM BROMIDE AND ALBUTEROL SULFATE 3 ML: 2.5; .5 SOLUTION RESPIRATORY (INHALATION) at 07:32

## 2021-02-08 RX ADMIN — METOPROLOL TARTRATE 25 MG: 25 TABLET, FILM COATED ORAL at 20:33

## 2021-02-08 RX ADMIN — SODIUM CHLORIDE, PRESERVATIVE FREE 10 ML: 5 INJECTION INTRAVENOUS at 20:34

## 2021-02-08 RX ADMIN — LORAZEPAM 2 MG: 2 INJECTION INTRAMUSCULAR; INTRAVENOUS at 01:48

## 2021-02-08 RX ADMIN — DEXMEDETOMIDINE 0.7 MCG/KG/HR: 100 INJECTION, SOLUTION, CONCENTRATE INTRAVENOUS at 14:14

## 2021-02-08 RX ADMIN — ENOXAPARIN SODIUM 40 MG: 40 INJECTION SUBCUTANEOUS at 08:03

## 2021-02-08 RX ADMIN — LORAZEPAM 2 MG: 2 INJECTION INTRAMUSCULAR; INTRAVENOUS at 04:04

## 2021-02-08 RX ADMIN — IPRATROPIUM BROMIDE AND ALBUTEROL SULFATE 3 ML: 2.5; .5 SOLUTION RESPIRATORY (INHALATION) at 20:38

## 2021-02-08 RX ADMIN — ACETAMINOPHEN ORAL SOLUTION 649.6 MG: 650 SOLUTION ORAL at 01:53

## 2021-02-08 RX ADMIN — IPRATROPIUM BROMIDE AND ALBUTEROL SULFATE 3 ML: 2.5; .5 SOLUTION RESPIRATORY (INHALATION) at 11:50

## 2021-02-08 RX ADMIN — METHYLNALTREXONE BROMIDE 12 MG: 12 INJECTION, SOLUTION SUBCUTANEOUS at 08:02

## 2021-02-08 RX ADMIN — QUETIAPINE FUMARATE 100 MG: 100 TABLET ORAL at 06:18

## 2021-02-08 RX ADMIN — BISACODYL 10 MG: 10 SUPPOSITORY RECTAL at 08:03

## 2021-02-08 RX ADMIN — PANTOPRAZOLE SODIUM 40 MG: 40 INJECTION, POWDER, LYOPHILIZED, FOR SOLUTION INTRAVENOUS at 06:19

## 2021-02-08 RX ADMIN — THIAMINE HCL TAB 100 MG 250 MG: 100 TAB at 20:33

## 2021-02-08 RX ADMIN — METOPROLOL TARTRATE 25 MG: 25 TABLET, FILM COATED ORAL at 08:02

## 2021-02-08 RX ADMIN — DIAZEPAM 5 MG: 5 INJECTION, SOLUTION INTRAMUSCULAR; INTRAVENOUS at 06:19

## 2021-02-08 RX ADMIN — THIAMINE HCL TAB 100 MG 100 MG: 100 TAB at 08:02

## 2021-02-08 RX ADMIN — DIAZEPAM 10 MG: 5 INJECTION, SOLUTION INTRAMUSCULAR; INTRAVENOUS at 12:11

## 2021-02-08 RX ADMIN — THIAMINE HCL TAB 100 MG 250 MG: 100 TAB at 15:58

## 2021-02-08 RX ADMIN — SODIUM CHLORIDE, PRESERVATIVE FREE 10 ML: 5 INJECTION INTRAVENOUS at 08:02

## 2021-02-08 RX ADMIN — CLONIDINE HYDROCHLORIDE 0.1 MG: 0.1 TABLET ORAL at 20:33

## 2021-02-08 RX ADMIN — DIAZEPAM 5 MG: 5 INJECTION, SOLUTION INTRAMUSCULAR; INTRAVENOUS at 15:58

## 2021-02-08 RX ADMIN — LORAZEPAM 2 MG: 2 INJECTION INTRAMUSCULAR; INTRAVENOUS at 08:13

## 2021-02-08 RX ADMIN — DEXMEDETOMIDINE 0.2 MCG/KG/HR: 100 INJECTION, SOLUTION, CONCENTRATE INTRAVENOUS at 09:00

## 2021-02-08 RX ADMIN — IPRATROPIUM BROMIDE AND ALBUTEROL SULFATE 3 ML: 2.5; .5 SOLUTION RESPIRATORY (INHALATION) at 15:16

## 2021-02-08 RX ADMIN — DIAZEPAM 10 MG: 5 INJECTION, SOLUTION INTRAMUSCULAR; INTRAVENOUS at 23:40

## 2021-02-08 RX ADMIN — OXYCODONE HYDROCHLORIDE 10 MG: 5 TABLET ORAL at 12:11

## 2021-02-08 RX ADMIN — DIAZEPAM 10 MG: 5 INJECTION, SOLUTION INTRAMUSCULAR; INTRAVENOUS at 18:03

## 2021-02-08 RX ADMIN — SENNOSIDES 10 ML: 8.8 LIQUID ORAL at 20:32

## 2021-02-08 RX ADMIN — CLONIDINE HYDROCHLORIDE 0.1 MG: 0.1 TABLET ORAL at 08:02

## 2021-02-08 RX ADMIN — MAGNESIUM SULFATE 4 G: 4 INJECTION INTRAVENOUS at 08:02

## 2021-02-08 RX ADMIN — ENOXAPARIN SODIUM 40 MG: 40 INJECTION SUBCUTANEOUS at 20:34

## 2021-02-08 RX ADMIN — OXYCODONE HYDROCHLORIDE 5 MG: 5 TABLET ORAL at 18:03

## 2021-02-08 RX ADMIN — FOLIC ACID 1 MG: 1 TABLET ORAL at 08:02

## 2021-02-08 RX ADMIN — ESCITALOPRAM OXALATE 20 MG: 20 TABLET ORAL at 08:02

## 2021-02-08 RX ADMIN — FUROSEMIDE 40 MG: 10 INJECTION INTRAMUSCULAR; INTRAVENOUS at 08:49

## 2021-02-08 NOTE — PLAN OF CARE
Goal Outcome Evaluation:        Outcome Summary: Pt continued to be oriented x4 up until 0000 in which he did again become confused.  Pt was more restless leading up to this point and the restlessness persisted throughout the night whenever the patient was not asleep.  Ativan x3 was given.  Pt did become oriented again this morning but remained restless.    Approximately 2 minutes after pt was placed back in a comfortably position and had leads replaced he was noted to be trying to get out of bed again, this time R arm PICC had been removed and bed was again completely disheveled.  BUE soft restraints resumed and IV placed in right hand.

## 2021-02-08 NOTE — PLAN OF CARE
Goal Outcome Evaluation:  Plan of Care Reviewed With: patient, daughter  Progress: declining  Outcome Summary: Pt was agitated and restless this am, precedex added back for anxiety, ativan did not appear to be effective. Pt placed on BiPAP for apnea and has been tachypneic. Pt was able to answer orientation questions approp, although garbled and hoarse. Since adding precedex pt does withdraw and at times will mumble incoherent words. Cont to get restless with oral care and much stimulus. Diuresed this am and had 1700 UOP from condom cath. Neuro consulted and made some med changes, seroquel stopped, maria e decreased, PRN valium added as well as scheduled. Pt has been SR-ST on monitor. 1 BM, passing flatus throughout the day. 400 output from NG. Mg replaced this am, recheck in morning. Pt had low grade fever 99.2, which broke and now afebrile. Lungs remain rhonchi/coarse with exp wheezes at times. NT suction performed, white/thick sputum. Daughter came to visit and was updated on pt status.   Update: UOP 1950

## 2021-02-08 NOTE — PROGRESS NOTES
Continued Stay Note  [facility]     Patient Name: Titus Bro  MRN: [id number]  Today's Date: 2/8/2021    Admit Date: 1/19/2021    Discharge Plan     Row Name 02/08/21 1125       Plan    Plan  Ongoig    Plan Comments  Discussed patient in am rounds.  Patient restless and confused overnight and pulled out PICC line.  Now sedated and in restraints.  Alternating between BiPAP and O2@3L.  CM will continue to follow.  Randa Yepez RN [phone]    Final Discharge Disposition Code  30 - still a patient        Discharge Codes    No documentation.       Expected Discharge Date and Time     Expected Discharge Date Expected Discharge Time    Feb 12, 2021             Randa Yepez RN

## 2021-02-08 NOTE — SIGNIFICANT NOTE
02/08/21 0902   SLP Deferred Reason   SLP Deferred Reason Unable to evaluate, medical status change  (Consult received for swallow eval. Spoke to RN who reported pt not appropriate for SLP eval. RN to call if status improves today; otherwise, will f/u tomorrow.)

## 2021-02-08 NOTE — PAYOR COMM NOTE
"María Elena Garcia RN Utilization Review [phone]  Fax # [phone]  Ref # [id number]        Debi Gunter (46 y.o. Male)     Date of Birth Social Security Number Address Home Phone MRN    1975  [address] [place] [address] [phone] [id number]    Rastafarian Marital Status          Non-Religion Single       Admission Date Admission Type Admitting Provider Attending Provider Department, Room/Bed    1/19/21 Emergency Jeanie Page DO Case, Theresa V., DO [facility] 2A ICU, N207/1    Discharge Date Discharge Disposition Discharge Destination                       Attending Provider: Jeanie Page DO    Allergies: Penicillins    Isolation: None   Infection: None   Code Status: CPR    Ht: 188 cm (74.02\")   Wt: 206 kg (454 lb 14.4 oz)    Admission Cmt: None   Principal Problem: Acute pancreatitis. Alcoholic +/- hyperlipidemia induced [K85.90]                 Active Insurance as of 1/19/2021     Primary Coverage     Payor Plan Insurance Group Employer/Plan Group    [employer] [place] [employer] [place]      Payor Plan Address Payor Plan Phone Number Payor Plan Fax Number Effective Dates    [address] [address] [phone]  7/17/2017 - None Entered    [facility] [id number]       Subscriber Name Subscriber Birth Date Member ID       DEBI GUNTER 1975 [id number]                 Emergency Contacts      (Rel.) Home Phone Work Phone Mobile Phone    Roxanne Gunter (Daughter) -- -- [phone]    DieudonneShira (Daughter) [phone] -- --    kareem gunter (Friend) [phone] -- [phone]    Rea Mccurdy (Significant Other) [phone] -- --            Vital Signs (last day)     Date/Time   Temp   Temp src   Pulse   Resp   BP   Patient Position   SpO2    02/08/21 0732   --   --   101   20   --   --   96    02/08/21 0700   --   --   93   22   140/83   Lying   --    02/08/21 0600   --   --   100   --   --   --   97    02/08/21 0540   --   --   96   18   --   " --   92    02/08/21 0500   --   --   99   20   --   --   94    02/08/21 0400   --   --   115   20   --   --   93    02/08/21 0300   --   --   99   20   112/59   Lying   92    02/08/21 0237   --   --   96   19   --   --   98    02/08/21 0200   --   --   116   24   144/82   Lying   100    02/08/21 0100   100 (37.8)   Bladder   108   24   153/85   Lying   95    02/08/21 0000   --   --   108   24   (!) 157/114   Lying   98    02/07/21 2309   --   --   --   24   --   --   --    02/07/21 2300   --   --   98   24   139/95   Lying   97    02/07/21 2200   --   --   97   20   146/88   Lying   95    02/07/21 2100   --   --   98   22   159/82   Lying   93    02/07/21 2000   99 (37.2)   Bladder   96   26   167/85   Lying   91    02/07/21 1907   --   --   104   26   --   --   --    02/07/21 1900   --   --   112   28   169/98   Lying   --    02/07/21 1820   --   --   92   (!) 30   (!) 166/104   --   92    02/07/21 1700   --   --   106   --   162/80   --   95    02/07/21 1620   --   --   98   (!) 30   145/82   --   92    02/07/21 1600   99.1 (37.3)   Bladder   106   28   145/82   --   91    02/07/21 1524   --   --   100   (!) 30   --   --   96    02/07/21 1500   --   --   105   --   127/91   --   95    02/07/21 1400   --   --   111   (!) 34   144/74   --   97    02/07/21 1300   --   --   92   --   167/90   --   95    02/07/21 1200   99.3 (37.4)   Bladder   93   (!) 32   167/90   --   95    02/07/21 1125   --   --   91   28   --   --   91    02/07/21 1100   --   --   95   --   127/86   --   (!) 89    02/07/21 1000   --   --   89   (!) 30   144/75   --   94    02/07/21 0900   --   --   100   --   151/93   --   95    02/07/21 0800   99.3 (37.4)   Bladder   116   (!) 30   156/73   --   94    02/07/21 0754   --   --   112   24   --   --   94    02/07/21 0700   --   --   111   --   126/73   --   95    02/07/21 0500   (!) 100.6 (38.1)   Bladder   112   22   150/80   Lying   97    02/07/21 0334   --   --   109   22   --   --   90     02/07/21 0300   --   --   102   24   170/90   Lying   92    02/07/21 0200   --   --   99   25   179/82   Lying   99    02/07/21 0000   (!) 100.8 (38.2)   Bladder   89   28   165/88   Lying   94                Current Facility-Administered Medications   Medication Dose Route Frequency Provider Last Rate Last Admin   • acetaminophen (TYLENOL) 160 MG/5ML solution 650 mg  650 mg Nasogastric Q6H PRN Raphael Gutierrez MD   649.6 mg at 02/08/21 0153   • bisacodyl (DULCOLAX) suppository 10 mg  10 mg Rectal Daily Brunner, Mark I, MD   10 mg at 02/08/21 0803   • cloNIDine (CATAPRES) tablet 0.1 mg  0.1 mg Nasogastric Q12H Raphael Gutierrez MD   0.1 mg at 02/08/21 0802   • DEXMEDETOMIDINE 1000 MCG/250ML INFUSION infusion  0.2-1.5 mcg/kg/hr Intravenous Titrated Bharath Rojas MD 14.8 mL/hr at 02/08/21 0930 0.4 mcg/kg/hr at 02/08/21 0930   • diazePAM (VALIUM) injection 10 mg  10 mg Intravenous Q6H Jeanie Page DO       • enoxaparin (LOVENOX) syringe 40 mg  40 mg Subcutaneous Q12H Ilda Collins MD   40 mg at 02/08/21 0803   • escitalopram (LEXAPRO) tablet 20 mg  20 mg Nasogastric Daily Raphael Gutierrez MD   20 mg at 02/08/21 0802   • folic acid (FOLVITE) tablet 1 mg  1 mg Nasogastric Daily Hanna Dickinson, PharmD   1 mg at 02/08/21 0802   • hydrALAZINE (APRESOLINE) injection 10 mg  10 mg Intravenous Q6H PRN Sandy Rae DO   10 mg at 01/24/21 0108   • influenza vac split quad (FLUZONE,FLUARIX,AFLURIA,FLULAVAL) injection 0.5 mL  0.5 mL Intramuscular During Hospitalization Alexia Velasquez APRN       • ipratropium-albuterol (DUO-NEB) nebulizer solution 3 mL  3 mL Nebulization Q4H - RT Raphael Gutierrez MD   3 mL at 02/08/21 0732   • ipratropium-albuterol (DUO-NEB) nebulizer solution 3 mL  3 mL Nebulization Q4H PRN Nicanor Robles, APRN   3 mL at 01/26/21 0511   • LORazepam (ATIVAN) injection 2 mg  2 mg Intravenous Q2H PRN Case, Jeanie V., DO   2 mg at 02/08/21 0813   • Magnesium Sulfate 2 gram Bolus, followed by 8 gram  infusion (total Mg dose 10 grams)- Mg less than or equal to 1mg/dL  2 g Intravenous PRN Twin Doss APRN        Or   • Magnesium Sulfate 2 gram / 50mL Infusion (GIVE X 3 BAGS TO EQUAL 6GM TOTAL DOSE) - Mg 1.1 - 1.5 mg/dl  2 g Intravenous PRN Twin Doss APRN        Or   • Magnesium Sulfate 4 gram infusion- Mg 1.6-1.9 mg/dL  4 g Intravenous PRN Twin Doss APRN 25 mL/hr at 02/08/21 0802 4 g at 02/08/21 0802   • methylnaltrexone (RELISTOR) injection 12 mg  12 mg Subcutaneous Every Other Day Brunner, Mark I, MD   12 mg at 02/08/21 0802   • metoprolol tartrate (LOPRESSOR) injection 2.5 mg  2.5 mg Intravenous Q6H PRN Hanna Arita APRN   2.5 mg at 01/21/21 1702   • metoprolol tartrate (LOPRESSOR) tablet 25 mg  25 mg Nasogastric Q12H Raphael Gutierrez MD   25 mg at 02/08/21 0802   • midazolam (VERSED) injection 5 mg  5 mg Intravenous Q2H PRN Bharath Rojas MD   5 mg at 01/31/21 0157   • nicotine (NICODERM CQ) 21 MG/24HR patch 1 patch  1 patch Transdermal Daily PRN Alexia Velasquez APRN       • ondansetron (ZOFRAN) injection 4 mg  4 mg Intravenous Q6H PRN Sandy Rae, DO   4 mg at 01/20/21 2319   • oxyCODONE (ROXICODONE) immediate release tablet 10 mg  10 mg Nasogastric Q6H Case, Jeanie V., DO   10 mg at 02/08/21 0618   • pantoprazole (PROTONIX) injection 40 mg  40 mg Intravenous Q AM Case, Jeanie V., DO   40 mg at 02/08/21 0619   • potassium chloride (KLOR-CON) packet 40 mEq  40 mEq Oral PRN Nicanor Robles APRN   40 mEq at 01/25/21 1445    Or   • potassium chloride 20 mEq in 50 mL IVPB  20 mEq Intravenous Q1H PRN Nicanor Robles APRN       • potassium phosphate 45 mmol in sodium chloride 0.9 % 250 mL infusion  45 mmol Intravenous PRN Nicanor Robles APRN 41.7 mL/hr at 01/25/21 0824 45 mmol at 01/25/21 0824    Or   • potassium phosphate 30 mmol in sodium chloride 0.9 % 100 mL infusion  30 mmol Intravenous PRN Nicanor Robles APRN        Or   • potassium phosphate  15 mmol in sodium chloride 0.9 % 100 mL infusion  15 mmol Intravenous PRN Nicanor Robles, APRMONICA        Or   • sodium phosphates 45 mmol in sodium chloride 0.9 % 250 mL IVPB  45 mmol Intravenous PRN Nicanor Robles APRN        Or   • sodium phosphates 30 mmol in sodium chloride 0.9 % 100 mL IVPB  30 mmol Intravenous PRN Nicanor Robles APRN 25 mL/hr at 01/24/21 1231 30 mmol at 01/24/21 1231    Or   • sodium phosphates 15 mmol in sodium chloride 0.9 % 100 mL IVPB  15 mmol Intravenous PRN Nicanor Robles APRN 50 mL/hr at 01/26/21 0115 15 mmol at 01/26/21 0115   • QUEtiapine (SEROquel) tablet 100 mg  100 mg Nasogastric Q8H Alexia Velasquez APRN   100 mg at 02/08/21 0618   • sennosides (SENOKOT) 8.8 MG/5ML syrup 10 mL  10 mL Nasogastric Nightly Bharath Rojas MD   10 mL at 02/07/21 2011   • simethicone (MYLICON) 40 MG/0.6ML drops 120 mg  120 mg Per J Tube 4x Daily PRN Brunner, Mark I, MD   120 mg at 02/07/21 0541   • sodium chloride 0.9 % flush 10 mL  10 mL Intravenous Q12H Raphael Gutierrez MD   10 mL at 02/08/21 0802   • sodium chloride 0.9 % flush 10 mL  10 mL Intravenous PRN Raphael Gutierrez MD       • thiamine (VITAMIN B-1) tablet 100 mg  100 mg Nasogastric Daily Raphael Gutierrez MD   100 mg at 02/08/21 0802     Orders (active)      Start     Ordered    02/08/21 1200  diazePAM (VALIUM) injection 10 mg  Every 6 Hours      02/08/21 0943    02/08/21 0302  Restraints Non-Violent or Non-Self Destructive  Calendar Day      02/08/21 0301    02/07/21 1656  SLP Consult: Eval & Treat Swallow Disorder; Other; orders per GI  Once      02/07/21 1656    02/06/21 1027  Diet, Tube Feeding Tube Feeding Formula: Peptamen Intense VHP (Peptide Based, Very High Protein)  Diet Effective Now      02/06/21 1026    02/04/21 1435  OT Consult: Eval & Treat  Once      02/04/21 1434    02/04/21 1435  PT Consult: Eval & Treat As Tolerated; Functional Mobility Below Baseline  Once      02/04/21 1435    02/04/21 1200  oxyCODONE  (ROXICODONE) immediate release tablet 10 mg  Every 6 Hours      02/04/21 0926    02/03/21 1815  Stage II Pressure Ulcer Care - Twice Daily  Every 12 Hours     Comments: - Gently Cleanse With Normal Saline  - Apply Skin Protective Barrier Cream Every 12 Hours    02/03/21 1814 02/03/21 1815  Deep Tissue Injury Care - Twice Daily  Every 12 Hours     Comments: - Apply Skin Protective Barrier Cream Every 12 Hours    02/03/21 1814 02/03/21 1814  Stage II Pressure Ulcer Care - As Needed  Per Order Details     Comments: - Gently Cleanse With Normal Saline  - Apply Skin Protective Barrier Cream As Needed After Cleansing    02/03/21 1814 02/03/21 1814  PT Consult: Eval & Treat  Once      02/03/21 1814 02/03/21 0600  QUEtiapine (SEROquel) tablet 100 mg  Every 8 Hours Scheduled      02/02/21 2339    02/01/21 0752  Magnesium Sulfate 2 gram Bolus, followed by 8 gram infusion (total Mg dose 10 grams)- Mg less than or equal to 1mg/dL  As Needed      02/01/21 0752    02/01/21 0752  Magnesium Sulfate 2 gram / 50mL Infusion (GIVE X 3 BAGS TO EQUAL 6GM TOTAL DOSE) - Mg 1.1 - 1.5 mg/dl  As Needed      02/01/21 0752    02/01/21 0752  Magnesium Sulfate 4 gram infusion- Mg 1.6-1.9 mg/dL  As Needed      02/01/21 0752    02/01/21 0430  NIPPV (CPAP or BIPAP)  As Needed-RT      02/01/21 0431    01/31/21 2236  LORazepam (ATIVAN) injection 2 mg  Every 2 Hours PRN      01/31/21 2236 01/31/21 0900  folic acid (FOLVITE) tablet 1 mg  Daily      01/30/21 0809    01/30/21 1900  bisacodyl (DULCOLAX) suppository 10 mg  Daily      01/30/21 1811 01/30/21 1810  simethicone (MYLICON) 40 MG/0.6ML drops 120 mg  4 Times Daily PRN      01/30/21 1811 01/30/21 1806  Resume trophic tube feedings at 20ml/hr  Nursing Communication  Once     Comments: Resume trophic tube feedings at 20ml/hr    01/30/21 1806    01/29/21 1900  methylnaltrexone (RELISTOR) injection 12 mg  Every Other Day      01/29/21 1800    01/29/21 1800  NG Tube to Low Wall  Suction  Continuous      01/29/21 1800    01/29/21 1759  Hold Tube Feeding  Until Discontinued     Comments: Until MD reassess in AM    01/29/21 1800    01/29/21 1220  Apply Cooling [place]  Once      01/29/21 1220    01/29/21 1029  midazolam (VERSED) injection 5 mg  Every 2 Hours PRN      01/29/21 1029    01/29/21 0900  DEXMEDETOMIDINE 1000 MCG/250ML INFUSION infusion  Titrated      01/29/21 0812    01/28/21 0034  influenza vac split quad (FLUZONE,FLUARIX,AFLURIA,FLULAVAL) injection 0.5 mL  During Hospitalization      01/28/21 0034    01/28/21 0033  nicotine (NICODERM CQ) 21 MG/24HR patch 1 patch  Daily PRN      01/28/21 0034    01/27/21 1548  Resume tube feeding (post-pyloric)  Nursing Communication  Once     Comments: Resume tube feeding (post-pyloric)    01/27/21 1548    01/27/21 1217  Respiratory communication  Once     Comments: Decrease set rate on ventilator to 16 breaths/min    01/27/21 1217    01/25/21 2100  sennosides (SENOKOT) 8.8 MG/5ML syrup 10 mL  Nightly      01/25/21 1020    01/25/21 1519  Obtain Medical Records  Until Discontinued     Comments: Please try to get current medication list and recent office notes from PCP.    01/25/21 1519    01/25/21 0900  cloNIDine (CATAPRES) tablet 0.1 mg  Every 12 Hours Scheduled      01/25/21 0734    01/25/21 0900  escitalopram (LEXAPRO) tablet 20 mg  Daily      01/25/21 0734    01/25/21 0900  metoprolol tartrate (LOPRESSOR) tablet 25 mg  Every 12 Hours Scheduled      01/25/21 0734    01/25/21 0900  thiamine (VITAMIN B-1) tablet 100 mg  Daily      01/25/21 0734    01/25/21 0621  potassium chloride (KLOR-CON) packet 40 mEq  As Needed      01/25/21 0621    01/25/21 0621  potassium chloride 20 mEq in 50 mL IVPB  Every 1 Hour PRN      01/25/21 0621    01/24/21 1953  Daily Weights  Daily      01/24/21 1952 01/24/21 1951  Residual Volume Assessment - Gastric Feedings  Per Order Details     Comments: Measure Gastric Residual Volume If Patient Complains of Nausea,  Abdominal Distention, Pain, Tenderness or Firmness  If Residual Volume Greater Than 500 mL, Re-Feed Amount That Was Removed, Hold Tube Feeding For 1 Hour & Recheck Residual.  Notify Provider If Second Residual Remains Greater Than 500 mL  Document Residual Volume, Amount Discarded & Appearance of Residual.    01/24/21 1952 01/24/21 1951  Monitor Adequacy Ratio  Continuous      01/24/21 1952 01/24/21 1950  Elevate Head Of Bed 30-45 Degrees  Continuous      01/24/21 1952 01/24/21 1950  Administer Tube Feeding Via Feeding Tube Already in Place  Continuous      01/24/21 1952 01/24/21 1950  Strict Intake & Output  Every Shift      01/24/21 1952 01/24/21 1950  Write Hang Time on Enteral Feeding Bag  Per Order Details     Comments: Enteral Nutrition Bag May Hang For 24 Hours.  If Cans in Bags, Hang Time Limited to 4 Hours.    01/24/21 1952 01/24/21 1950  Notify Provider - Abdominal Discomfort, Pain or Distention, No Bowel Movement in 3 Days  Until Discontinued      01/24/21 1952 01/24/21 1946  Intermittently use a coudé catheter to suction patient and direct the tip towards the left lung.  Physician Communication Order  Per Order Details     Comments: Intermittently use a coudé catheter to suction patient and direct the tip towards the left lung.    01/24/21 1946    01/24/21 1245  fentaNYL 2500 mcg/250 mL NS infusion  Titrated      01/24/21 1147    01/24/21 0700  Patient Currently On Electrolyte Replacement Protocol - Please Refer to MAR for Protocol Details  Misc Nursing Order (Specify)  Daily     Comments: Patient Currently On Electrolyte Replacement Protocol - Please Refer to MAR for Protocol Details    01/24/21 0659    01/24/21 0659  potassium phosphate 45 mmol in sodium chloride 0.9 % 250 mL infusion  As Needed      01/24/21 0659    01/24/21 0659  potassium phosphate 30 mmol in sodium chloride 0.9 % 100 mL infusion  As Needed      01/24/21 0659    01/24/21 0659  potassium phosphate 15 mmol in  sodium chloride 0.9 % 100 mL infusion  As Needed      01/24/21 0659    01/24/21 0659  sodium phosphates 45 mmol in sodium chloride 0.9 % 250 mL IVPB  As Needed      01/24/21 0659    01/24/21 0659  sodium phosphates 30 mmol in sodium chloride 0.9 % 100 mL IVPB  As Needed      01/24/21 0659    01/24/21 0659  sodium phosphates 15 mmol in sodium chloride 0.9 % 100 mL IVPB  As Needed      01/24/21 0659    01/24/21 0600  PICC Site Care  Weekly     Comments: Dressing Changes to PICC Site Every 7 Days and As Needed    01/23/21 1231    01/24/21 0400  Oral Care & Teeth Brushing  Every 4 Hours     Comments: [place] Teeth at Least 2x/day    01/24/21 0135    01/24/21 0330  acetylcysteine (MUCOMYST) 20 % nebulizer solution 4 mL  Every 4 Hours - RT      01/24/21 0158    01/24/21 0158  ipratropium-albuterol (DUO-NEB) nebulizer solution 3 mL  Every 4 Hours PRN      01/24/21 0158    01/24/21 0136  Spontaneous Awakening Trial  Daily      01/24/21 0135    01/24/21 0134  NPO Diet  Diet Effective Now      01/24/21 0135    01/24/21 0134  Elevate HOB  Continuous      01/24/21 0135    01/24/21 0134  RN May Place Order For ABG As Needed for Respiratory Distress  Continuous      01/24/21 0135    01/23/21 1530  ipratropium-albuterol (DUO-NEB) nebulizer solution 3 mL  Every 4 Hours - RT      01/23/21 1258    01/23/21 1330  sodium chloride 0.9 % flush 10 mL  Every 12 Hours Scheduled      01/23/21 1231    01/23/21 1300  acetaminophen (TYLENOL) 160 MG/5ML solution 650 mg  Every 6 Hours PRN      01/23/21 1255    01/23/21 1207  Catheter Care PICC  Per Order Details     Comments: 1) Follow PICC Protocol For Care  2) No Blood Pressure in Arm With PICC  3) Warm Packs to PICC Arm As Needed For Discomfort  4) Measure & Record Arm Circumference if Patient Experiences Pain, Swelling, Redness or Warmth in PICC Arm; Compare Measurement to Initial Measure, Report to Provider if Greater Than 3cm Difference  5) Follow Flush Protocol Per Facility    01/23/21 8357     01/23/21 1207  Ensure PICC Positive Pressure Cap is In Place  Per Order Details     Comments: Change Every 3 Days & As Needed For Evidence of Infusate or Blood in Cap    01/23/21 1231    01/23/21 1207  No Venipuncture or BP in PICC Arm  Continuous     Comments: Right arm    01/23/21 1231    01/23/21 1207  Verify Informed Consent for PICC Line Placement  Once      01/23/21 1231    01/23/21 1207  Ensure PICC Securing Device is in Use  Continuous      01/23/21 1231    01/23/21 1207  Do NOT Draw From PICC  Continuous      01/23/21 1231    01/23/21 1207  May Use PICC for Power Injected CT  Continuous      01/23/21 1231    01/23/21 1207  Remove PICC Cap for CT  Continuous      01/23/21 1231    01/23/21 1207  No Dilantin Through PICC  Continuous      01/23/21 1231    01/23/21 1207  Notify Provider if PICC is Occluded  Until Discontinued      01/23/21 1231    01/23/21 1206  sodium chloride 0.9 % flush 10 mL  As Needed      01/23/21 1231    01/22/21 0600  pantoprazole (PROTONIX) injection 40 mg  Every Early Morning      01/22/21 0444    01/22/21 0107  Nasogastric Tube Maintenance  Continuous      01/22/21 0115    01/21/21 2214  Straight Cath  Once      01/21/21 2213    01/20/21 2100  enoxaparin (LOVENOX) syringe 40 mg  Every 12 Hours      01/20/21 1230    01/20/21 1459  HYDROcodone-acetaminophen (NORCO) 5-325 MG per tablet 1 tablet  Every 6 Hours PRN      01/20/21 1500    01/20/21 0535  Turn Cough Deep Breathe  Once      01/20/21 0534    01/20/21 0027  metoprolol tartrate (LOPRESSOR) injection 2.5 mg  Every 6 Hours PRN      01/20/21 0027    01/19/21 1600  Vital Signs  Every 4 Hours      01/19/21 1408    01/19/21 1438  Seizure Precautions  Continuous      01/19/21 1437    01/19/21 1438  Fall Precautions  Continuous      01/19/21 1437    01/19/21 1436  hydrALAZINE (APRESOLINE) injection 10 mg  Every 6 Hours PRN      01/19/21 1436    01/19/21 1435  Continuous Pulse Oximetry  Continuous      01/19/21 1434    01/19/21 1435   Clinical [place] Withdrawal Assessment (CIWA-Ar)  Per Hospital Policy      01/19/21 1434    01/19/21 1435  If CIWA Score Less Than 8 Monitor Every 4 Hours & Then Discontinue Assessment - Restart Scoring Assessment & Protocol If Symptoms Reappear  Continuous      01/19/21 1434    01/19/21 1435  Obtain Pre & Post Sedation Scores With Every Lorazepam Dose - Hold For POSS Greater Than 2 or RASS of -3 or Less  Continuous      01/19/21 1434    01/19/21 1435  Seizure Precautions  Continuous      01/19/21 1434    01/19/21 1435  Safety Precautions  Continuous      01/19/21 1434    01/19/21 1408  Cardiac Monitoring  Continuous      01/19/21 1408    01/19/21 1408  Activity - Ad Urvashi  Until Discontinued      01/19/21 1408    01/19/21 1407  ondansetron (ZOFRAN) injection 4 mg  Every 6 Hours PRN      01/19/21 1408    01/19/21 1407  Code Status and Medical Interventions:  Continuous      01/19/21 1408    01/19/21 1407  Intake & Output  Every Shift      01/19/21 1408    01/19/21 1407  Insert Peripheral IV  Once      01/19/21 1408    01/19/21 1407  Saline Lock & Maintain IV Access  Continuous      01/19/21 1408    01/19/21 0937  Insert peripheral IV  Once      01/19/21 0936    01/19/21 0932  Insert Peripheral IV  Once      01/19/21 0932    Unscheduled  Subglottic Suctioning Must Be Done Every 6 Hours  As Needed      01/24/21 0135    Unscheduled  Magnesium  As Needed      01/24/21 0659    Unscheduled  Potassium  As Needed      01/24/21 0659    Unscheduled  Vipin and Document Tube Depth (in cm)  As Needed      01/24/21 1952    Unscheduled  Flush Feeding Tube With 30-50mL Water As Needed  As Needed      01/24/21 1952    Unscheduled  Deep Tissue Injury Care - As Needed  As Needed     Comments: - Apply Skin Protective Barrier Cream As Needed After Cleansing    02/03/21 1814    Signed and Held  XR Chest 1 View  1 Time Imaging      Signed and Held                Ventilator/Non-Invasive Ventilation Settings (From admission, onward)      Start     Ordered    02/01/21 0430  NIPPV (CPAP or BIPAP)  As Needed-RT     Question Answer Comment   Indication: Acute Respiratory Failure    Type: BIPAP    NIPPV Mask Interface: Full Face Mask    Titrate for SPO2 90%        02/01/21 0431    02/01/21 0234  NIPPV (CPAP or BIPAP)  Until Discontinued,   Status:  Canceled     Question Answer Comment   Indication: Acute Respiratory Failure    Type: BIPAP    NIPPV Mask Interface: Full Face Mask    Titrate for SPO2 90%        02/01/21 0234    01/25/21 0624  Ventilator - AC/VC+; (18); 90%; 5; 6  Continuous,   Status:  Canceled     Question Answer Comment   Vent Mode AC/VC+    Breath rate  18   FiO2 titrate for Sp02% =/> 90%    PEEP 5    Tidal Volume ML/Kg 6        01/25/21 0623    01/24/21 1949  Ventilator - AC/VC+; (24); 90%; 8; 6  Continuous,   Status:  Canceled     Question Answer Comment   Vent Mode AC/VC+    Breath rate  24   FiO2 titrate for Sp02% =/> 90%    PEEP 8    Tidal Volume ML/Kg 6        01/24/21 1948    01/24/21 0346  Ventilator - AC/VC+; (24); 92%; 6; 6  Continuous,   Status:  Canceled     Question Answer Comment   Vent Mode AC/VC+    Breath rate  24   FiO2 titrate for Sp02% =/> 92%    PEEP 6    Tidal Volume ML/Kg 6        01/24/21 0345    01/24/21 0134  Ventilator - AC/VC+; (20); 92%; 6; 660; 8  Continuous,   Status:  Canceled     Question Answer Comment   Vent Mode AC/VC+    Breath rate  20   FiO2 titrate for Sp02% =/> 92%    PEEP 6    Tidal Volume 660    Tidal Volume ML/Kg 8        01/24/21 0135    01/23/21 1304  NIPPV (CPAP or BIPAP)  Until Discontinued,   Status:  Canceled     Comments: Adjust BiPAP to achieve a tidal volume of approximately 400-450 cc   Question Answer Comment   Indication: Sleep Apnea    Type: BIPAP    NIPPV Mask Interface: Per Patient Preference    Titrate for SPO2 90%        01/23/21 1304    01/20/21 1958  NIPPV (CPAP or BIPAP)  Until Discontinued,   Status:  Canceled     Question Answer Comment   Indication: Sleep Apnea    Type:  AutoCPAP    NIPPV Mask Interface: Per Patient Preference    Titrate for SPO2 90%        01/20/21 1957                Operative/Procedure Notes (last 24 hours) (Notes from 02/07/21 0954 through 02/08/21 0954)    No notes of this type exist for this encounter.           Camilo Jeanie MCKAY DO   Physician   Intensivist   Progress Notes   Signed   Date of Service:  02/07/21 0947   Creation Time:  02/07/21 0947            Signed        Expand All Collapse All  []Expand All by Default    Show:Clear all  [x]Manual[x]Template[x]Copied    Added by:  [x]Camilo Jeanie MCKAY DO    []Hover for details  INTENSIVIST   PROGRESS NOTE      Hospital:  LOS: 19 days      Mr. Titus Bro, 46 y.o. male is followed for a Chief Complaint of: Encephalopathy           Subjective      S      Interval History:  No acute events overnight. Off Precedex since yesterday. One bowel movement yesterday.          The patient's relevant past medical, surgical and social history were reviewed and updated in Epic as appropriate.      ROS:   Constitutional: Negative for fever.   Respiratory: Negative for dyspnea.   Cardiovascular: Negative for chest pain.   Gastrointestinal: Negative for  nausea, vomiting and diarrhea.            Objective      O      Vitals:  Temp  Min: 99.3 °F (37.4 °C)  Max: 100.8 °F (38.2 °C)  BP  Min: 126/73  Max: 179/82  Pulse  Min: 89  Max: 116  Resp  Min: 20  Max: 36  SpO2  Min: 88 %  Max: 99 % Flow (L/min)  Min: 3  Max: 4     Intake/Ouptut 24 hrs (7:00AM - 6:59 AM)           Intake & Output (last 3 days)        02/04 0701 - 02/05 0700 02/05 0701 - 02/06 0700 02/06 0701 - 02/07 0700 02/07 0701 - 02/08 0700     I.V. (mL/kg) 1408 (6.8) 913.4 (4.4) 323 (1.6)       Other 1098 1149 469 180     NG/GT 1923 1492 1588 689     IV Piggyback 170 158.6         Total Intake(mL/kg) 4599 (22.3) 3713 (18) 2380 (11.6) 869 (4.2)     Urine (mL/kg/hr) 4300 (0.9) 4185 (0.8) 4800 (1) 225 (0.4)     Emesis/NG output 600 650 950       Stool 0   0       Total  Output 4760 4835 5750 225     Net -301 -1122 -3370 +644                   Stool Unmeasured Occurrence 2 x   2 x               Medications (drips):  dexmedetomidine, Last Rate: Stopped (02/06/21 1200)              Physical Examination  Telemetry:  Normal sinus rhythm.    Constitutional:  No acute distress.  Resting in bed on 2L NC.    Eyes: No scleral icterus.   PERRL, EOM intact.    Neck:  Supple, FROM   Cardiovascular: Normal rate, regular and rhythm. Normal heart sounds.  No murmurs, gallop or rub.   Respiratory: No respiratory distress. Normal respiratory effort.  Coarse rhonchi bilaterally.    Abdominal:  Soft. No masses. Nontender. No distension. No HSM.   Extremities: No digital cyanosis. No clubbing.  1+ peripheral edema.   Skin: No rashes, lesions or ulcers   Neurological:             Alert and interactive.                           Results from last 7 days   Lab Units 02/07/21 0319 02/06/21 0310 02/05/21  0424   WBC 10*3/mm3 9.62 8.39 8.02   HEMOGLOBIN g/dL 10.1* 10.3* 10.4*   MCV fL 86.3 87.2 89.6   PLATELETS 10*3/mm3 737* 687* 682*             Results from last 7 days   Lab Units 02/07/21 0319 02/06/21  0310 02/05/21  0424   SODIUM mmol/L 140 139 143   POTASSIUM mmol/L 3.9 4.1 3.9   CO2 mmol/L 31.0* 30.0* 32.0*   CREATININE mg/dL 0.63* 0.51* 0.53*   GLUCOSE mg/dL 103* 139* 126*   MAGNESIUM mg/dL 1.9 1.9 1.8   PHOSPHORUS mg/dL 3.5 3.3 4.0      Estimated Creatinine Clearance: 273.5 mL/min (A) (by C-G formula based on SCr of 0.63 mg/dL (L)).         Results from last 7 days   Lab Units 02/05/21  0424 02/03/21  0352 02/01/21  0339   ALK PHOS U/L 62 73 83   BILIRUBIN mg/dL 0.2 0.3 0.3   ALT (SGPT) U/L 16 21 22   AST (SGOT) U/L 22 38 60*              Results from last 7 days   Lab Units 02/01/21  0140   PH, ARTERIAL pH units 7.422   PCO2, ARTERIAL mm Hg 49.4*   PO2 ART mm Hg 79.9*   FIO2 % 33         Images:         Imaging Results (Last 24 Hours)      ** No results found for the last 24 hours. **              Results: Reviewed.  I reviewed the patient's new laboratory and imaging results.  I independently reviewed the patient's new images.     Medications: Reviewed.        Assessment/Plan      A / P      Mr. Bro is a 47yo M with a history of ETOH abuse who was admitted on 1/19/21 with pancreatitis. He was admitted to Hospital Medicine but developed ETOH withdrawal necessitating transfer to the ICU on 1/22/21. He was placed on a Precedex drip and given IV Valium. He continued to require intermittent doses of Ativan despite this. He ultimately required intubation with mechanical ventilation on 1/24/21. A repeat CT of the abdomen/pelvis was performed on 1/27/21 due to worsening fevers and showed some possible necrosis in the head of the pancreas and worsening peripancreatic fluid collections. GI is following. He was extubated on 1/31/21. Precedex was finally stopped on 2/6/21.      Nutrition:                      NPO Diet  Advance Directives:          Code Status and Medical Interventions:   Ordered at: 01/19/21 1408     Level Of Support Discussed With:     Patient     Code Status:     CPR     Medical Interventions (Level of Support Prior to Arrest):     Full             Active Hospital Problems     Diagnosis   • **Acute pancreatitis. Alcoholic +/- hyperlipidemia induced   • Alcohol withdrawal    • Hypertriglyceridemia   • Fatty liver   • Acute hypoxemic respiratory failure requiring NIV. ICU transfer 1/22/2021 (CMS/[facility])   • Fever and increased procalcitonin. ? source   • Class 3 severe obesity in adult    • LIANA   • Alcohol abuse   • Essential hypertension   • GERD without esophagitis         Assessment / Plan:     1. Continue to monitor in the ICU.   2. NT suctioning as needed.   3. Continue Seroquel and Valium.     4. Continue tube feeds and free water.   5. Speech evaluation.  6. CT abdomen/pelvis today per GI.   7. Continue thiamine  8. Replace magnesium  9. Bowel regimen  10. Lasix 40mg IV x 1 again today.    11. AM labs        High level of risk due to:  severe exacerbation of chronic illness and illness with threat to life or bodily function.     Plan of care and goals reviewed during interdisciplinary rounds.  I discussed the patient's findings and my recommendations with patient and nursing staff        Jeanie Page,      Intensive Care Medicine and Pulmonary Medicine

## 2021-02-08 NOTE — CONSULTS
Neurology    Referring provider:   No referring provider defined for this encounter.    Reason for Consultation: Alcohol withdrawal    Chief complaint: Alcohol withdrawal    History of present illness: 46-year-old -American man seen for Dr. Page for evaluation of alcohol withdrawal.    He has a history of acute pancreatitis with necrosis on this admission.    He has been tried and failed on high-dose Ativan, 10 of Valium 3 times daily, Seroquel 100 mg 3 times daily.  He is currently on Precedex and is highly sedated.    Is also been on oxycodone 10 mg 1 schedule IV times a day.    It is said that he drinks anywhere from 1/5 to a gallon of alcohol a day.        Review of Systems: Patient is sedated and unresponsive    Home meds:   Medications Prior to Admission   Medication Sig Dispense Refill Last Dose   • amantadine (SYMMETREL) 100 MG capsule Take 100 mg by mouth 2 (Two) Times a Day.   1/18/2021 at Unknown time   • celecoxib (CeleBREX) 400 MG capsule Take 400 mg by mouth 2 (Two) Times a Day.   1/18/2021 at Unknown time   • CloNIDine (CATAPRES) 0.1 MG tablet Take 0.1 mg by mouth 2 (Two) Times a Day.   1/18/2021 at Unknown time   • dicyclomine (BENTYL) 20 MG tablet Take 1 tablet by mouth Every 8 (Eight) Hours As Needed (pain, cramps). 20 tablet 0 1/18/2021 at Unknown time   • escitalopram (LEXAPRO) 20 MG tablet Take 20 mg by mouth Daily.   1/18/2021 at Unknown time   • folic acid (FOLVITE) 1 MG tablet Take 1 mg by mouth Daily.   1/18/2021 at Unknown time   • hydrochlorothiazide (HYDRODIURIL) 25 MG tablet Take 25 mg by mouth Daily.   1/18/2021 at Unknown time   • hydrOXYzine (VISTARIL) 50 MG capsule Take 50 mg by mouth As Needed for Itching.   1/18/2021 at Unknown time   • meloxicam (MOBIC) 7.5 MG tablet Take 7.5 mg by mouth 2 (Two) Times a Day.   1/18/2021 at Unknown time   • methocarbamol (ROBAXIN) 750 MG tablet Take 750 mg by mouth 2 (Two) Times a Day.   1/18/2021 at Unknown time   • metoprolol tartrate  "(LOPRESSOR) 25 MG tablet Take 25 mg by mouth 2 (Two) Times a Day.   1/18/2021 at Unknown time   • omeprazole (priLOSEC) 40 MG capsule Take 40 mg by mouth Daily.   1/18/2021 at Unknown time   • ondansetron (ZOFRAN) 4 MG tablet Take 1 tablet by mouth Every 8 (Eight) Hours As Needed for Nausea. 15 tablet 0 1/18/2021 at Unknown time   • vitamin B-12 (CYANOCOBALAMIN) 1000 MCG tablet Take 1,000 mcg by mouth Daily.   1/18/2021 at Unknown time       History  Past Medical History:   Diagnosis Date   • Anxiety    • Arthritis    • Asthma    • COPD (chronic obstructive pulmonary disease) (CMS/[facility])    • GERD (gastroesophageal reflux disease)    • H/O chest tube placement    • Hypertension    • Seizures (CMS/[facility])     WITHDRAWAL   ,   Past Surgical History:   Procedure Laterality Date   • KNEE ARTHROSCOPY Bilateral    • WRIST ARTHROPLASTY Left    ,   Family History   Problem Relation Age of Onset   • Alcohol abuse Mother    ,   Social History     Tobacco Use   • Smoking status: Current Every Day Smoker     Packs/day: 1.00     Types: Cigarettes   • Smokeless tobacco: Never Used   Substance Use Topics   • Alcohol use: Yes     Alcohol/week: 7.0 standard drinks     Types: 7 Cans of beer per week     Comment: pt has drank a gallon and a half of liquor for the last 4 days   • Drug use: No    and Allergies:  Penicillins,    Vital Signs   Blood pressure 134/83, pulse 89, temperature 99.2 °F (37.3 °C), temperature source Axillary, resp. rate 28, height 188 cm (74.02\"), weight (!) 206 kg (454 lb 14.4 oz), SpO2 94 %.  Body mass index is 58.38 kg/m².    Physical Exam:   General: 450 pound -American male with BiPAP              Head: No trauma              Neck: Supple with no bruit              Resp: Loud upper airway respirations              Cor: Regular rhythm              Extremities: No edema              Skin: Warm and dry              Neuro: Sedated and unresponsive    Pupils are 1 mm and nonresponsive.    Eyes are conjugate.  " Doll's eyes are absent.    Corneal reflexes are decreased.    Motor testing shows him to be hypotonic throughout.  He has no spontaneous movement.    Deep tendon reflexes are absent.    He has no withdrawal to deep pain.      Results Review: White count is 9510.…  676,000.  H is hemoglobin is 10.3 microcytic indices.    ET abdomen pelvis shows extensive inflammatory changes with fluid surrounding the pancreas and some low-density at the head compatible with necrosis.    Labs:  Lab Results (last 72 hours)     Procedure Component Value Units Date/Time    Basic Metabolic Panel [[id number]]  (Abnormal) Collected: 02/08/21 0330    Specimen: Blood Updated: 02/08/21 0512     Glucose 122 mg/dL      BUN 14 mg/dL      Creatinine 0.55 mg/dL      Sodium 138 mmol/L      Potassium 3.9 mmol/L      Chloride 99 mmol/L      CO2 30.0 mmol/L      Calcium 9.1 mg/dL      eGFR  African Amer >150 mL/min/1.73      BUN/Creatinine Ratio 25.5     Anion Gap 9.0 mmol/L     Narrative:      GFR Normal >60  Chronic Kidney Disease <60  Kidney Failure <15      Magnesium [[id number]]  (Normal) Collected: 02/08/21 0330    Specimen: Blood Updated: 02/08/21 0512     Magnesium 1.9 mg/dL     Phosphorus [[id number]]  (Normal) Collected: 02/08/21 0330    Specimen: Blood Updated: 02/08/21 0512     Phosphorus 4.1 mg/dL     CBC (No Diff) [[id number]]  (Abnormal) Collected: 02/08/21 0330    Specimen: Blood Updated: 02/08/21 0448     WBC 9.51 10*3/mm3      RBC 3.93 10*6/mm3      Hemoglobin 10.3 g/dL      Hematocrit 34.2 %      MCV 87.0 fL      MCH 26.2 pg      MCHC 30.1 g/dL      RDW 13.9 %      RDW-SD 44.1 fl      MPV 11.1 fL      Platelets 676 10*3/mm3     Blood Gas, Arterial With Co-Ox [[id number]]  (Abnormal) Collected: 02/08/21 0155    Specimen: Arterial Blood Updated: 02/08/21 0155     Site Left Radial     Donnell's Test N/A     pH, Arterial 7.453 pH units      Comment: 83 Value above reference range        pCO2, Arterial 49.4 mm Hg      Comment: 83 Value above  reference range        pO2, Arterial 62.5 mm Hg      Comment: 84 Value below reference range        HCO3, Arterial 34.6 mmol/L      Base Excess, Arterial 9.4 mmol/L      Hemoglobin, Blood Gas 10.6 g/dL      Comment: 84 Value below reference range        Hematocrit, Blood Gas 32.5 %      Oxyhemoglobin 90.5 %      Comment: 84 Value below reference range        Methemoglobin 0.60 %      Carboxyhemoglobin 0.9 %      CO2 Content 36.1 mmol/L      Temperature 37.0 C      Barometric Pressure for Blood Gas --     Comment: N/A        Modality Nasal Cannula     FIO2 28 %      Rate 0 Breaths/minute      PIP 0 cmH2O      Comment: Meter: [id number]     :  [id number]        IPAP 0     EPAP 0     Note --     pH, Temp Corrected 7.453 pH Units      pCO2, Temperature Corrected 49.4 mm Hg      pO2, Temperature Corrected 62.5 mm Hg     Basic Metabolic Panel [[id number]]  (Abnormal) Collected: 02/07/21 0319    Specimen: Blood Updated: 02/07/21 0434     Glucose 103 mg/dL      BUN 17 mg/dL      Creatinine 0.63 mg/dL      Sodium 140 mmol/L      Potassium 3.9 mmol/L      Chloride 99 mmol/L      CO2 31.0 mmol/L      Calcium 9.1 mg/dL      eGFR  African Amer >150 mL/min/1.73      BUN/Creatinine Ratio 27.0     Anion Gap 10.0 mmol/L     Narrative:      GFR Normal >60  Chronic Kidney Disease <60  Kidney Failure <15      Magnesium [[id number]]  (Normal) Collected: 02/07/21 0319    Specimen: Blood Updated: 02/07/21 0434     Magnesium 1.9 mg/dL     Phosphorus [[id number]]  (Normal) Collected: 02/07/21 0319    Specimen: Blood Updated: 02/07/21 0434     Phosphorus 3.5 mg/dL     CBC (No Diff) [[id number]]  (Abnormal) Collected: 02/07/21 0319    Specimen: Blood Updated: 02/07/21 0411     WBC 9.62 10*3/mm3      RBC 3.86 10*6/mm3      Hemoglobin 10.1 g/dL      Hematocrit 33.3 %      MCV 86.3 fL      MCH 26.2 pg      MCHC 30.3 g/dL      RDW 14.1 %      RDW-SD 44.8 fl      MPV 10.8 fL      Platelets 737 10*3/mm3     Basic Metabolic Panel [[id number]]   (Abnormal) Collected: 02/06/21 0310    Specimen: Blood Updated: 02/06/21 0356     Glucose 139 mg/dL      BUN 15 mg/dL      Creatinine 0.51 mg/dL      Sodium 139 mmol/L      Potassium 4.1 mmol/L      Chloride 102 mmol/L      CO2 30.0 mmol/L      Calcium 8.9 mg/dL      eGFR  African Amer >150 mL/min/1.73      BUN/Creatinine Ratio 29.4     Anion Gap 7.0 mmol/L     Narrative:      GFR Normal >60  Chronic Kidney Disease <60  Kidney Failure <15      Magnesium [[id number]]  (Normal) Collected: 02/06/21 0310    Specimen: Blood Updated: 02/06/21 0356     Magnesium 1.9 mg/dL     Phosphorus [[id number]]  (Normal) Collected: 02/06/21 0310    Specimen: Blood Updated: 02/06/21 0356     Phosphorus 3.3 mg/dL     CBC (No Diff) [[id number]]  (Abnormal) Collected: 02/06/21 0310    Specimen: Blood Updated: 02/06/21 0327     WBC 8.39 10*3/mm3      RBC 3.91 10*6/mm3      Hemoglobin 10.3 g/dL      Hematocrit 34.1 %      MCV 87.2 fL      MCH 26.3 pg      MCHC 30.2 g/dL      RDW 14.2 %      RDW-SD 45.2 fl      MPV 10.7 fL      Platelets 687 10*3/mm3     POC Glucose Once [[id number]]  (Normal) Collected: 02/05/21 1238    Specimen: Blood Updated: 02/05/21 1241     Glucose 96 mg/dL           Rads:  Imaging Results (Last 72 Hours)     Procedure Component Value Units Date/Time    CT Abdomen Pelvis With & Without Contrast [[id number]] Collected: 02/07/21 1215     Updated: 02/07/21 1440    Narrative:      EXAMINATION: CT ABDOMEN/PELVIS WWO CONTRAST - 02/07/2021     INDICATION: Upper abdominal pain, history of pancreatitis and alcohol  abuse.     TECHNIQUE: Multiple axial CT imaging is obtained of the abdomen and  pelvis with and without the administration of intravenous contrast.     The radiation dose reduction device was turned on for each scan per the  ALARA (As Low as Reasonably Achievable) protocol.     COMPARISON: None.     FINDINGS:      ABDOMEN: Mild increased markings suggesting atelectatic changes at the  lung bases bilaterally. The liver is  homogeneous. The spleen is  unremarkable. There is extensive abnormal peripancreatic stranding and  fluid identified. Findings suggesting an acute pancreatitis. No  loculation identified of the fluid at this time. The pancreas itself is  unremarkable except for some low-density identified of the head. Kidneys  and adrenal glands within normal limits. Feeding tube identified tip in  the third portion of the duodenum. No stones in the gallbladder. Fluid  in the paracolic gutters. The remainder of the gastrointestinal tract  within normal limits.     PELVIS: The pelvic organs are unremarkable. The pelvic portion of the  gastrointestinal tract within normal limits. No free fluid or free air.  No abnormal mass or fluid collections identified. No pelvic adenopathy.  Anasarca seen in the subcutaneous tissues. The bony structures are  unremarkable. Erickson catheter balloon within the bladder.       Impression:      Extensive inflammatory changes seen in fluid surrounding the  pancreas with some low-density at the head. Atelectatic changes seen at  the lung bases bilaterally. No loculated fluid collection identified at  this time. Findings again suggesting severe acute pancreatitis.     DICTATED:   02/07/2021  EDITED/ls :   02/07/2021     This report was finalized on 2/7/2021 2:37 PM by Dr. Jackelin Pacheco MD.       XR Abdomen KUB [[id number]] Collected: 02/05/21 1302     Updated: 02/05/21 2258    Narrative:      EXAMINATION: XR ABDOMEN KUB-     INDICATION: Keofeed placement; K85.90-Acute pancreatitis without  necrosis or infection, unspecified.     COMPARISON: 02/03/2021.     FINDINGS: NG tube is seen in the mid stomach. Feeding tube is in the  proximal stomach. There is a moderate amount of colon gas present and a  single mildly distended small bowel loop, nonspecific.       Impression:      1. NG tube in the mid stomach.  2. Feeding tube in the proximal stomach.      D:  02/05/2021  E:  02/05/2021     This report was  finalized on 2/5/2021 10:55 PM by Dr. Narendra Colon MD.       XR Abdomen KUB [[id number]] Collected: 02/05/21 1325     Updated: 02/05/21 2258    Narrative:      EXAMINATION: XR ABDOMEN/KUB-02/05/2021:     INDICATION: Keofeed placement; K85.90-Acute pancreatitis without  necrosis or infection, unspecified.     COMPARISON: 12/05/2021.     FINDINGS: Feeding tube is seen at the junction of the second and third  portions of the duodenum. The bowel gas pattern is nonobstructive and  similar to the prior exam. NG tube remains in the mid stomach.       Impression:      Feeding tube has passed to the mid duodenum.     D:  02/05/2021  E:  02/05/2021            This report was finalized on 2/5/2021 10:55 PM by Dr. Narendra Colon MD.               Assessment: Alcohol withdrawal with delirium tremens.    Acute pancreatitis.    Morbid obesity.    Seizure, likely alcohol withdrawal       Plan:    EEG    Valium scheduled at 10 mg IV every 6 hours.    Add Valium 5 mg IV every 4 hours as needed for agitation.    Wean Precedex as possible.    Decrease oxycodone to 5 mg 4 times daily and taper over the next several days.    Discontinue Seroquel    Thiamine 250 mg 3 times daily for 3 days.        Comment:   Thanks patient needs high doses of thiamine gets a slight possibility of a Korsakoff psychosis Warnicke's encephalopathy.    Seroquel does not have a significant role to play and alcohol withdrawal has been discontinued.    Recognizing that pancreatitis can be painful we will continue the oxycodone lower dose and try to establish need since his sedation is making it hard to evaluate.    Gnosis is guarded given the combination of respiratory failure severe pancreatitis and alcohol withdrawal.    I discussed the patients findings and my recommendations with nursing staff and primary care team      Ángel Rios MD  02/08/21  12:33 EST

## 2021-02-08 NOTE — PROGRESS NOTES
Clinical Nutrition   Reason For Visit: MDR, Follow-up protocol, EN    Patient Name: Titus Bro  YOB: 1975  MRN: [id number]  Date of Encounter: 02/08/21 09:41 EST  Admission date: 1/19/2021      Nutrition Assessment     Admission Problem List:  Acute pancreatitis. Alcoholic +/- hyperlipidemia induced  Alcohol abuse  LIANA, improved  Alcohol withdrawal   Hypertriglyceridemia  Fatty liver  Acute hypoxemic respiratory failure requiring NIV. ICU transfer 1/22/2021  Vent (1/24) --> extubated (1/31)  Fevers  Ileus  Hypophosphatemia, resolved  Per CT abdomen/pelvis (1/27)- pancreatic head- acute necrotic pancreatitis   KUB (2/3)- demonstrating diffuse segments of gas-filled and dilated small and large bowel compatible with obstruction or ileus, at most minimally improved from recent comparison.       Applicable PMH:  HTN  GERD  COPD  Seizure 2/2 EtOH withdrawal      Applicable Nutrition-Related Information:  (1/24) EN ordered per intensivist- Peptamen Intense VHP at 75 ml/hr and free water at 55 ml/hr via NGT  (1/25) EN rate decreased per RD 2/2 hypophosphatemia- VHP at 20 ml/hr  (1/26) EN rate increased per RD- 100 ml/hr  (1/27) Pt had residual of 500 ml this AM, EN held. NDT placed and EN restarted via NDT  (1/28) Pt tolerating EN via NDT  (1/30) free water decreased to 30 ml/hr per intensivist  (2/1) Per previous RN notes, pt had 1150 ml out NGT on (1/29), EN was then held ~1800. EN restarted at trophic rate on (1/30). EN held on (1/31) and then restarted later that day at 25 ml/hr. EN currently at 60 ml/hr and pt tolerating, advancing slowly to goal rate as pt tolerates.   (2/3) EN has been on hold overnight 2/2 pt complaining of needing a bowel movement. GI planning on giving pt mag citrate. OK per GI and intensivist to restart EN at goal rate at this time. Will increase free water up to 50 ml/hr secondary to continued hypernatremia.        Reported/Observed/Food/Nutrition Related History     Confused and  agiated yesterday. Pulled out his PICC line.  Pulled out feeding tube on 2/6; it has been replaced. EN running @ goal rate bedside this morning. NGT to suction.     Per 24 I/Os: + 800ml output     Anthropometrics   Height: 74 in  Weight: 327 lb (1/22), no method documented  BMI: 42.0  BMI classification: Obese Class III extreme obesity: > or equal to 40kg/m2   IBW: 190 lb    Date Weight (kg) Weight (lbs) Weight Method   2/1/2021 206.341 kg  454 lb 14.4 oz  Bed scale   1/22/2021 148.326 kg 327 lb -   1/22/2021 148.4 kg 327 lb 2.6 oz -   1/19/2021 151.955 kg 335 lb -   1/19/2021 151.955 kg 335 lb Stated   6/10/2020 151.955 kg 335 lb -   8/7/2019 144.244 kg 318 lb Stated   7/17/2017 129.275 kg 285 lb Stated       Labs reviewed   Labs reviewed: Yes    Results from last 7 days   Lab Units 02/08/21  0330 02/07/21  0319 02/06/21  0310   SODIUM mmol/L 138 140 139   POTASSIUM mmol/L 3.9 3.9 4.1   CHLORIDE mmol/L 99 99 102   CO2 mmol/L 30.0* 31.0* 30.0*   BUN mg/dL 14 17 15   CREATININE mg/dL 0.55* 0.63* 0.51*   GLUCOSE mg/dL 122* 103* 139*   CALCIUM mg/dL 9.1 9.1 8.9   PHOSPHORUS mg/dL 4.1 3.5 3.3   MAGNESIUM mg/dL 1.9 1.9 1.9     Results from last 7 days   Lab Units 02/08/21  0330 02/07/21  0319 02/06/21  0310 02/05/21  0424  02/03/21  0352   WBC 10*3/mm3 9.51 9.62 8.39 8.02   < > 10.57   ALBUMIN g/dL  --   --   --  2.60*  --  2.50*    < > = values in this interval not displayed.     Results from last 7 days   Lab Units 02/05/21  1238 02/04/21  2321 02/04/21  1711 02/04/21  1140 02/04/21  0525 02/03/21  2332   GLUCOSE mg/dL 96 124 115 104 136* 123     No results found for: HGBA1C  Medications reviewed   Medications reviewed: Yes  Pertinent: dulcolax, valium, folic acid, lasix, antibiotic, relistor, oxycodone, protonix, seroquel, senokot, thiamin  GTT: precedex    PRN: tylenol, ativan    Needs Assessment  (2/1)   Height used: 74 in/188 cm  Weight used: 327 lb/148 kg  IBW: 190 lb/86 kg    Estimated Calories needs: ~2000  kcal/day  14 kcal/kg actual weight= 2072 kcal    Estimated Protein needs:   2.0-2.5 g/kg IBW= 172-215 g pro      Current Nutrition Prescription   PO: NPO Diet       EN: Peptamen Intense VHP   Diet, Tube Feeding Tube Feeding Formula: Peptamen Intense VHP (Peptide Based, Very High Protein) at 100 ml/hr (goal volume= 2000 ml/day) and free water at 50 ml/hr  Route: ND  Verified at bedside: Yes   Provides at goal volume: 2000 kcal, 184 g pro, 8 g fiber, 1680 ml water from EN, 2680 ml water total  \  Evaluation of Received Nutrient/Fluid Intake-EN:  1 Day:   1723ml (86%)     Nutrition Diagnosis   1/25  Problem Less than optimal enteral nutrition regimen    Etiology Clinical status/lab values/needs assessment    Signs/Symptoms Hypophosphatemia     resolved      1/22  Problem Inadequate energy intake, inadequate protein intake   Etiology Clinical status   Signs/Symptoms NPO/Clear liquids x 72hrs     Status: EN started (1/24)      Intervention   Intervention: Follow treatment progress, Care plan reviewed   -Will continue with current EN order at this time  -Will continue to monitor Na+ and adjust free water as needed    -Will continue to follow and adjust nutrition support regimen as medically appropriate      Goal:   General: Nutrition support treatment  PO: N/A   EN/PN: Maintain EN, Tolerate EN at goal      Monitoring/Evaluation:   Monitoring/Evaluation: Per protocol, I&O, Pertinent labs, EN delivery/tolerance, Weight, GI status, Symptoms    Denise Ramos RD  Time Spent: 45 minutes

## 2021-02-08 NOTE — PROGRESS NOTES
INTENSIVIST   PROGRESS NOTE     Hospital:  LOS: 20 days     Mr. Titus Bro, 46 y.o. male is followed for a Chief Complaint of: Encephalopathy      Subjective   S     Interval History:  Went wild overnight and started back on Precedex. Sedated this AM on Bipap.         The patient's relevant past medical, surgical and social history were reviewed and updated in Epic as appropriate.      ROS: Unable to obtain secondary to sedation.       Objective   O     Vitals:  Temp  Min: 99 °F (37.2 °C)  Max: 100 °F (37.8 °C)  BP  Min: 112/59  Max: 169/98  Pulse  Min: 89  Max: 116  Resp  Min: 18  Max: 34  SpO2  Min: 91 %  Max: 100 % Flow (L/min)  Min: 1.5  Max: 4    Intake/Ouptut 24 hrs (7:00AM - 6:59 AM)  Intake & Output (last 3 days)       02/05 0701 - 02/06 0700 02/06 0701 - 02/07 0700 02/07 0701 - 02/08 0700 02/08 0701 - 02/09 0700    I.V. (mL/kg) 913.4 (4.4) 323 (1.6)      Other 6081 908 6038 181    NG/GT 1492 1588 2089 535    IV Piggyback 158.6       Total Intake(mL/kg) 3713 (18) 2380 (11.6) 3227 (15.7) 716 (3.5)    Urine (mL/kg/hr) 4185 (0.8) 4800 (1) 4925 (1) 975 (0.9)    Emesis/NG output 650 950 800     Stool  0 0 0    Total Output 4835 5750 5725 975    Net -1122 -3370 -2498 -259            Stool Unmeasured Occurrence  2 x 2 x 1 x          Medications (drips):  dexmedetomidine, Last Rate: 1.5 mcg/kg/hr (02/08/21 1115)          Physical Examination  Telemetry:  Normal sinus rhythm.    Constitutional:  No acute distress.  Resting in bed on Bipap.    Eyes: No scleral icterus.   PERRL, EOM intact.    Neck:  Supple, FROM   Cardiovascular: Normal rate, regular and rhythm. Normal heart sounds.  No murmurs, gallop or rub.   Respiratory: No respiratory distress. Normal respiratory effort.  Coarse rhonchi bilaterally.    Abdominal:  Soft. No masses. Nontender. No distension. No HSM.   Extremities: No digital cyanosis. No clubbing.  1+ peripheral edema.   Skin: No rashes, lesions or ulcers   Neurological:   Sedated. Awakens to  stimulation.               Results from last 7 days   Lab Units 02/08/21  0330 02/07/21  0319 02/06/21  0310   WBC 10*3/mm3 9.51 9.62 8.39   HEMOGLOBIN g/dL 10.3* 10.1* 10.3*   MCV fL 87.0 86.3 87.2   PLATELETS 10*3/mm3 676* 737* 687*     Results from last 7 days   Lab Units 02/08/21  0330 02/07/21  0319 02/06/21  0310   SODIUM mmol/L 138 140 139   POTASSIUM mmol/L 3.9 3.9 4.1   CO2 mmol/L 30.0* 31.0* 30.0*   CREATININE mg/dL 0.55* 0.63* 0.51*   GLUCOSE mg/dL 122* 103* 139*   MAGNESIUM mg/dL 1.9 1.9 1.9   PHOSPHORUS mg/dL 4.1 3.5 3.3     Estimated Creatinine Clearance: 313.3 mL/min (A) (by C-G formula based on SCr of 0.55 mg/dL (L)).  Results from last 7 days   Lab Units 02/05/21  0424 02/03/21  0352   ALK PHOS U/L 62 73   BILIRUBIN mg/dL 0.2 0.3   ALT (SGPT) U/L 16 21   AST (SGOT) U/L 22 38       Results from last 7 days   Lab Units 02/08/21  0155   PH, ARTERIAL pH units 7.453*   PCO2, ARTERIAL mm Hg 49.4*   PO2 ART mm Hg 62.5*   FIO2 % 28       Images:    Imaging Results (Last 24 Hours)     Procedure Component Value Units Date/Time    CT Abdomen Pelvis With & Without Contrast [[id number]] Collected: 02/07/21 1215     Updated: 02/07/21 1440    Narrative:      EXAMINATION: CT ABDOMEN/PELVIS WWO CONTRAST - 02/07/2021     INDICATION: Upper abdominal pain, history of pancreatitis and alcohol  abuse.     TECHNIQUE: Multiple axial CT imaging is obtained of the abdomen and  pelvis with and without the administration of intravenous contrast.     The radiation dose reduction device was turned on for each scan per the  ALARA (As Low as Reasonably Achievable) protocol.     COMPARISON: None.     FINDINGS:      ABDOMEN: Mild increased markings suggesting atelectatic changes at the  lung bases bilaterally. The liver is homogeneous. The spleen is  unremarkable. There is extensive abnormal peripancreatic stranding and  fluid identified. Findings suggesting an acute pancreatitis. No  loculation identified of the fluid at this time.  The pancreas itself is  unremarkable except for some low-density identified of the head. Kidneys  and adrenal glands within normal limits. Feeding tube identified tip in  the third portion of the duodenum. No stones in the gallbladder. Fluid  in the paracolic gutters. The remainder of the gastrointestinal tract  within normal limits.     PELVIS: The pelvic organs are unremarkable. The pelvic portion of the  gastrointestinal tract within normal limits. No free fluid or free air.  No abnormal mass or fluid collections identified. No pelvic adenopathy.  Anasarca seen in the subcutaneous tissues. The bony structures are  unremarkable. Erickson catheter balloon within the bladder.       Impression:      Extensive inflammatory changes seen in fluid surrounding the  pancreas with some low-density at the head. Atelectatic changes seen at  the lung bases bilaterally. No loculated fluid collection identified at  this time. Findings again suggesting severe acute pancreatitis.     DICTATED:   02/07/2021  EDITED/ls :   02/07/2021     This report was finalized on 2/7/2021 2:37 PM by Dr. Jackelin Pacheco MD.             Results: Reviewed.  I reviewed the patient's new laboratory and imaging results.  I independently reviewed the patient's new images.    Medications: Reviewed.    Assessment/Plan   A / P     Mr. Bro is a 47yo M with a history of ETOH abuse who was admitted on 1/19/21 with pancreatitis. He was admitted to Hospital Medicine but developed ETOH withdrawal necessitating transfer to the ICU on 1/22/21. He was placed on a Precedex drip and given IV Valium. He continued to require intermittent doses of Ativan despite this. He ultimately required intubation with mechanical ventilation on 1/24/21. A repeat CT of the abdomen/pelvis was performed on 1/27/21 due to worsening fevers and showed some possible necrosis in the head of the pancreas and worsening peripancreatic fluid collections. GI is following. He was extubated on  1/31/21. Precedex was finally stopped on 2/6/21 but had to be restarted on 2/7/21 due to agitation.     Nutrition:   NPO Diet  Advance Directives:   Code Status and Medical Interventions:   Ordered at: 01/19/21 1408     Level Of Support Discussed With:    Patient     Code Status:    CPR     Medical Interventions (Level of Support Prior to Arrest):    Full       Active Hospital Problems    Diagnosis   • **Acute pancreatitis. Alcoholic +/- hyperlipidemia induced   • Alcohol withdrawal    • Hypertriglyceridemia   • Fatty liver   • Acute hypoxemic respiratory failure requiring NIV. ICU transfer 1/22/2021 (CMS/[facility])   • Fever and increased procalcitonin. ? source   • Class 3 severe obesity in adult    • LIANA   • Alcohol abuse   • Essential hypertension   • GERD without esophagitis       Assessment / Plan:    Continue to monitor in the ICU.   NT suctioning as needed.   Consult Neurology to help with agitation and altered mental status.      Continue tube feeds and free water.   Speech evaluation when appropriate.    Continue thiamine  Bowel regimen per GI  Lasix 40mg IV x 1 again today.   AM labs      High level of risk due to:  severe exacerbation of chronic illness and illness with threat to life or bodily function.    Plan of care and goals reviewed during interdisciplinary rounds.  I discussed the patient's findings and my recommendations with patient and nursing staff      Jeanie Page, DO    Intensive Care Medicine and Pulmonary Medicine

## 2021-02-09 ENCOUNTER — APPOINTMENT (OUTPATIENT)
Dept: GENERAL RADIOLOGY | Facility: HOSPITAL | Age: 46
End: 2021-02-09

## 2021-02-09 LAB
ANION GAP SERPL CALCULATED.3IONS-SCNC: 7 MMOL/L (ref 5–15)
BUN SERPL-MCNC: 17 MG/DL (ref 6–20)
BUN/CREAT SERPL: 29.3 (ref 7–25)
CALCIUM SPEC-SCNC: 9.6 MG/DL (ref 8.6–10.5)
CHLORIDE SERPL-SCNC: 99 MMOL/L (ref 98–107)
CO2 SERPL-SCNC: 32 MMOL/L (ref 22–29)
CREAT SERPL-MCNC: 0.58 MG/DL (ref 0.76–1.27)
DEPRECATED RDW RBC AUTO: 44.9 FL (ref 37–54)
ERYTHROCYTE [DISTWIDTH] IN BLOOD BY AUTOMATED COUNT: 13.8 % (ref 12.3–15.4)
GFR SERPL CREATININE-BSD FRML MDRD: >150 ML/MIN/1.73
GLUCOSE SERPL-MCNC: 131 MG/DL (ref 65–99)
HCT VFR BLD AUTO: 36.8 % (ref 37.5–51)
HGB BLD-MCNC: 10.8 G/DL (ref 13–17.7)
MAGNESIUM SERPL-MCNC: 1.9 MG/DL (ref 1.6–2.6)
MCH RBC QN AUTO: 26.2 PG (ref 26.6–33)
MCHC RBC AUTO-ENTMCNC: 29.3 G/DL (ref 31.5–35.7)
MCV RBC AUTO: 89.1 FL (ref 79–97)
PHOSPHATE SERPL-MCNC: 3.5 MG/DL (ref 2.5–4.5)
PLATELET # BLD AUTO: 720 10*3/MM3 (ref 140–450)
PMV BLD AUTO: 10.9 FL (ref 6–12)
POTASSIUM SERPL-SCNC: 4.5 MMOL/L (ref 3.5–5.2)
RBC # BLD AUTO: 4.13 10*6/MM3 (ref 4.14–5.8)
SODIUM SERPL-SCNC: 138 MMOL/L (ref 136–145)
WBC # BLD AUTO: 10.49 10*3/MM3 (ref 3.4–10.8)

## 2021-02-09 PROCEDURE — 83735 ASSAY OF MAGNESIUM: CPT | Performed by: INTERNAL MEDICINE

## 2021-02-09 PROCEDURE — 94799 UNLISTED PULMONARY SVC/PX: CPT

## 2021-02-09 PROCEDURE — 84100 ASSAY OF PHOSPHORUS: CPT | Performed by: INTERNAL MEDICINE

## 2021-02-09 PROCEDURE — 25010000002 ENOXAPARIN PER 10 MG: Performed by: FAMILY MEDICINE

## 2021-02-09 PROCEDURE — 97530 THERAPEUTIC ACTIVITIES: CPT

## 2021-02-09 PROCEDURE — 99232 SBSQ HOSP IP/OBS MODERATE 35: CPT | Performed by: INTERNAL MEDICINE

## 2021-02-09 PROCEDURE — 85027 COMPLETE CBC AUTOMATED: CPT | Performed by: INTERNAL MEDICINE

## 2021-02-09 PROCEDURE — 25010000003 MAGNESIUM SULFATE 4 GM/100ML SOLUTION: Performed by: NURSE PRACTITIONER

## 2021-02-09 PROCEDURE — 74018 RADEX ABDOMEN 1 VIEW: CPT

## 2021-02-09 PROCEDURE — 25010000002 DIAZEPAM PER 5 MG: Performed by: INTERNAL MEDICINE

## 2021-02-09 PROCEDURE — 97110 THERAPEUTIC EXERCISES: CPT

## 2021-02-09 PROCEDURE — 80048 BASIC METABOLIC PNL TOTAL CA: CPT | Performed by: INTERNAL MEDICINE

## 2021-02-09 PROCEDURE — 25010000002 ONDANSETRON PER 1 MG: Performed by: FAMILY MEDICINE

## 2021-02-09 PROCEDURE — 99232 SBSQ HOSP IP/OBS MODERATE 35: CPT | Performed by: PSYCHIATRY & NEUROLOGY

## 2021-02-09 PROCEDURE — 97602 WOUND(S) CARE NON-SELECTIVE: CPT

## 2021-02-09 PROCEDURE — 92610 EVALUATE SWALLOWING FUNCTION: CPT

## 2021-02-09 PROCEDURE — 25010000002 DIAZEPAM PER 5 MG: Performed by: PSYCHIATRY & NEUROLOGY

## 2021-02-09 RX ORDER — OXYCODONE HYDROCHLORIDE 5 MG/1
5 TABLET ORAL EVERY 6 HOURS PRN
Status: DISCONTINUED | OUTPATIENT
Start: 2021-02-09 | End: 2021-02-11 | Stop reason: HOSPADM

## 2021-02-09 RX ORDER — DOCUSATE SODIUM 50 MG/5 ML
100 LIQUID (ML) ORAL 2 TIMES DAILY
Status: DISCONTINUED | OUTPATIENT
Start: 2021-02-09 | End: 2021-02-11

## 2021-02-09 RX ORDER — NICOTINE 21 MG/24HR
1 PATCH, TRANSDERMAL 24 HOURS TRANSDERMAL
Status: DISCONTINUED | OUTPATIENT
Start: 2021-02-09 | End: 2021-02-11 | Stop reason: HOSPADM

## 2021-02-09 RX ADMIN — DIAZEPAM 5 MG: 5 INJECTION, SOLUTION INTRAMUSCULAR; INTRAVENOUS at 14:49

## 2021-02-09 RX ADMIN — ENOXAPARIN SODIUM 40 MG: 40 INJECTION SUBCUTANEOUS at 20:52

## 2021-02-09 RX ADMIN — CLONIDINE HYDROCHLORIDE 0.1 MG: 0.1 TABLET ORAL at 20:52

## 2021-02-09 RX ADMIN — MAGNESIUM SULFATE 4 G: 4 INJECTION INTRAVENOUS at 15:17

## 2021-02-09 RX ADMIN — SIMETHICONE 120 MG: 20 EMULSION ORAL at 17:47

## 2021-02-09 RX ADMIN — METOPROLOL TARTRATE 25 MG: 25 TABLET, FILM COATED ORAL at 08:24

## 2021-02-09 RX ADMIN — ACETAMINOPHEN ORAL SOLUTION 650 MG: 650 SOLUTION ORAL at 14:48

## 2021-02-09 RX ADMIN — PANTOPRAZOLE SODIUM 40 MG: 40 INJECTION, POWDER, LYOPHILIZED, FOR SOLUTION INTRAVENOUS at 05:15

## 2021-02-09 RX ADMIN — METOPROLOL TARTRATE 25 MG: 25 TABLET, FILM COATED ORAL at 20:52

## 2021-02-09 RX ADMIN — THIAMINE HCL TAB 100 MG 250 MG: 100 TAB at 08:23

## 2021-02-09 RX ADMIN — ESCITALOPRAM OXALATE 20 MG: 20 TABLET ORAL at 08:23

## 2021-02-09 RX ADMIN — IPRATROPIUM BROMIDE AND ALBUTEROL SULFATE 3 ML: 2.5; .5 SOLUTION RESPIRATORY (INHALATION) at 19:57

## 2021-02-09 RX ADMIN — OXYCODONE HYDROCHLORIDE 5 MG: 5 TABLET ORAL at 05:15

## 2021-02-09 RX ADMIN — FOLIC ACID 1 MG: 1 TABLET ORAL at 08:24

## 2021-02-09 RX ADMIN — DEXMEDETOMIDINE 0.8 MCG/KG/HR: 100 INJECTION, SOLUTION, CONCENTRATE INTRAVENOUS at 02:43

## 2021-02-09 RX ADMIN — DIAZEPAM 10 MG: 5 INJECTION, SOLUTION INTRAMUSCULAR; INTRAVENOUS at 12:18

## 2021-02-09 RX ADMIN — IPRATROPIUM BROMIDE AND ALBUTEROL SULFATE 3 ML: 2.5; .5 SOLUTION RESPIRATORY (INHALATION) at 15:32

## 2021-02-09 RX ADMIN — SENNOSIDES 10 ML: 8.8 LIQUID ORAL at 20:52

## 2021-02-09 RX ADMIN — ACETAMINOPHEN ORAL SOLUTION 650 MG: 650 SOLUTION ORAL at 08:41

## 2021-02-09 RX ADMIN — IPRATROPIUM BROMIDE AND ALBUTEROL SULFATE 3 ML: 2.5; .5 SOLUTION RESPIRATORY (INHALATION) at 12:04

## 2021-02-09 RX ADMIN — Medication 1 PATCH: at 14:16

## 2021-02-09 RX ADMIN — THIAMINE HCL TAB 100 MG 250 MG: 100 TAB at 20:52

## 2021-02-09 RX ADMIN — DOCUSATE SODIUM 100 MG: 50 LIQUID ORAL at 10:11

## 2021-02-09 RX ADMIN — ENOXAPARIN SODIUM 40 MG: 40 INJECTION SUBCUTANEOUS at 08:22

## 2021-02-09 RX ADMIN — DIAZEPAM 5 MG: 5 INJECTION, SOLUTION INTRAMUSCULAR; INTRAVENOUS at 08:22

## 2021-02-09 RX ADMIN — OXYCODONE 5 MG: 5 TABLET ORAL at 19:29

## 2021-02-09 RX ADMIN — DIAZEPAM 10 MG: 5 INJECTION, SOLUTION INTRAMUSCULAR; INTRAVENOUS at 17:51

## 2021-02-09 RX ADMIN — CLONIDINE HYDROCHLORIDE 0.1 MG: 0.1 TABLET ORAL at 08:23

## 2021-02-09 RX ADMIN — OXYCODONE 5 MG: 5 TABLET ORAL at 12:18

## 2021-02-09 RX ADMIN — OXYCODONE HYDROCHLORIDE 5 MG: 5 TABLET ORAL at 00:15

## 2021-02-09 RX ADMIN — ONDANSETRON 4 MG: 2 INJECTION INTRAMUSCULAR; INTRAVENOUS at 12:53

## 2021-02-09 RX ADMIN — SODIUM CHLORIDE, PRESERVATIVE FREE 10 ML: 5 INJECTION INTRAVENOUS at 20:52

## 2021-02-09 RX ADMIN — IPRATROPIUM BROMIDE AND ALBUTEROL SULFATE 3 ML: 2.5; .5 SOLUTION RESPIRATORY (INHALATION) at 00:38

## 2021-02-09 RX ADMIN — DIAZEPAM 10 MG: 5 INJECTION, SOLUTION INTRAMUSCULAR; INTRAVENOUS at 05:15

## 2021-02-09 RX ADMIN — ONDANSETRON 4 MG: 2 INJECTION INTRAMUSCULAR; INTRAVENOUS at 20:49

## 2021-02-09 RX ADMIN — DOCUSATE SODIUM 100 MG: 50 LIQUID ORAL at 20:52

## 2021-02-09 RX ADMIN — THIAMINE HCL TAB 100 MG 250 MG: 100 TAB at 16:58

## 2021-02-09 RX ADMIN — BISACODYL 10 MG: 10 SUPPOSITORY RECTAL at 08:23

## 2021-02-09 RX ADMIN — SODIUM CHLORIDE, PRESERVATIVE FREE 10 ML: 5 INJECTION INTRAVENOUS at 08:24

## 2021-02-09 RX ADMIN — IPRATROPIUM BROMIDE AND ALBUTEROL SULFATE 3 ML: 2.5; .5 SOLUTION RESPIRATORY (INHALATION) at 06:36

## 2021-02-09 NOTE — PROGRESS NOTES
INTENSIVIST   PROGRESS NOTE     Hospital:  LOS: 21 days     Mr. Titus Bro, 46 y.o. male is followed for a Chief Complaint of: Encephalopathy      Subjective   S     Interval History:  On much lower Precedex this AM.        The patient's relevant past medical, surgical and social history were reviewed and updated in Epic as appropriate.      ROS:   Constitutional: Negative for fever.   Respiratory: Negative for dyspnea.   Cardiovascular: Negative for chest pain.   Gastrointestinal: Negative for  nausea, vomiting and diarrhea.         Objective   O     Vitals:  Temp  Min: 98.1 °F (36.7 °C)  Max: 100.1 °F (37.8 °C)  BP  Min: 94/61  Max: 136/68  Pulse  Min: 70  Max: 89  Resp  Min: 20  Max: 30  SpO2  Min: 92 %  Max: 100 % Flow (L/min)  Min: 4  Max: 40    Intake/Ouptut 24 hrs (7:00AM - 6:59 AM)  Intake & Output (last 3 days)       02/06 0701 - 02/07 0700 02/07 0701 - 02/08 0700 02/08 0701 - 02/09 0700 02/09 0701 - 02/10 0700    I.V. (mL/kg) 323 (1.6)  728.9 (3.5) 185 (0.9)    Other 469 1138 561 210    NG/GT 1588 2089 2084 836    IV Piggyback        Total Intake(mL/kg) 2380 (11.6) 3227 (15.7) 3373.9 (16.4) 1231 (6)    Urine (mL/kg/hr) 4800 (1) 4925 (1) 3100 (0.6) 175 (0.1)    Emesis/NG output 950 800 400     Stool 0 0 0 0    Total Output 5750 5725 3500 175    Net -3370 -2498 -126.1 +1056            Stool Unmeasured Occurrence 2 x 2 x 1 x 2 x          Medications (drips):  dexmedetomidine, Last Rate: Stopped (02/09/21 0915)          Physical Examination  Telemetry:  Normal sinus rhythm.    Constitutional:  No acute distress.  Sitting up in bed.    Eyes: No scleral icterus.   PERRL, EOM intact.    Neck:  Supple, FROM   Cardiovascular: Normal rate, regular and rhythm. Normal heart sounds.  No murmurs, gallop or rub.   Respiratory: No respiratory distress. Normal respiratory effort.  Clear to auscultation bilaterally.    Abdominal:  Soft. No masses. Nontender. No distension. No HSM.   Extremities: No digital cyanosis. No  clubbing.  1+ peripheral edema.   Skin: No rashes, lesions or ulcers   Neurological:   Alert and interactive.               Results from last 7 days   Lab Units 02/09/21  0321 02/08/21  0330 02/07/21  0319   WBC 10*3/mm3 10.49 9.51 9.62   HEMOGLOBIN g/dL 10.8* 10.3* 10.1*   MCV fL 89.1 87.0 86.3   PLATELETS 10*3/mm3 720* 676* 737*     Results from last 7 days   Lab Units 02/09/21  0906 02/08/21  0330 02/07/21  0319   SODIUM mmol/L 138 138 140   POTASSIUM mmol/L 4.5 3.9 3.9   CO2 mmol/L 32.0* 30.0* 31.0*   CREATININE mg/dL 0.58* 0.55* 0.63*   GLUCOSE mg/dL 131* 122* 103*   MAGNESIUM mg/dL 1.9 1.9 1.9   PHOSPHORUS mg/dL 3.5 4.1 3.5     Estimated Creatinine Clearance: 297.1 mL/min (A) (by C-G formula based on SCr of 0.58 mg/dL (L)).  Results from last 7 days   Lab Units 02/05/21  0424 02/03/21  0352   ALK PHOS U/L 62 73   BILIRUBIN mg/dL 0.2 0.3   ALT (SGPT) U/L 16 21   AST (SGOT) U/L 22 38       Results from last 7 days   Lab Units 02/08/21  0155   PH, ARTERIAL pH units 7.453*   PCO2, ARTERIAL mm Hg 49.4*   PO2 ART mm Hg 62.5*   FIO2 % 28       Images:    Imaging Results (Last 24 Hours)     Procedure Component Value Units Date/Time    XR Abdomen KUB [[id number]] Collected: 02/09/21 0640     Updated: 02/09/21 0642    Narrative:      CR Abdomen 1 Vw, CR Abdomen 1 Vw    INDICATION:   Acute pancreatitis. Keofeed placement    COMPARISON:   2/5/2021    FINDINGS:  AP radiographs of the abdomen. Nasogastric tube is demonstrated with the tip overlying the left upper quadrant of the abdomen. A Keofeed tube is demonstrated overlying the expected location of the proximal duodenum.    The bowel pattern is nonobstructive. No free air is identified. No clearly pathologic calcifications.        Impression:      Nasogastric tube and Keofeed tube in position as described.    Signer Name: Dax Valle MD   Signed: 2/9/2021 6:40 AM   Workstation Name: [email or web address]    [facility]    XR Abdomen KUB [[id number]]  Collected: 02/09/21 0640     Updated: 02/09/21 0642    Narrative:      CR Abdomen 1 Vw, CR Abdomen 1 Vw    INDICATION:   Acute pancreatitis. Keofeed placement    COMPARISON:   2/5/2021    FINDINGS:  AP radiographs of the abdomen. Nasogastric tube is demonstrated with the tip overlying the left upper quadrant of the abdomen. A Keofeed tube is demonstrated overlying the expected location of the proximal duodenum.    The bowel pattern is nonobstructive. No free air is identified. No clearly pathologic calcifications.        Impression:      Nasogastric tube and Keofeed tube in position as described.    Signer Name: Dax Valle MD   Signed: 2/9/2021 6:40 AM   Workstation Name: [id number]    [facility]          Results: Reviewed.  I reviewed the patient's new laboratory and imaging results.  I independently reviewed the patient's new images.    Medications: Reviewed.    Assessment/Plan   A / P     Mr. Bor is a 47yo M with a history of ETOH abuse who was admitted on 1/19/21 with pancreatitis. He was admitted to Hospital Medicine but developed ETOH withdrawal necessitating transfer to the ICU on 1/22/21. He was placed on a Precedex drip and given IV Valium. He continued to require intermittent doses of Ativan despite this. He ultimately required intubation with mechanical ventilation on 1/24/21. A repeat CT of the abdomen/pelvis was performed on 1/27/21 due to worsening fevers and showed some possible necrosis in the head of the pancreas and worsening peripancreatic fluid collections. GI is following. He was extubated on 1/31/21. Precedex was finally stopped on 2/6/21 but had to be restarted on 2/7/21 due to agitation. Neurology was consulted and changed up his sedation regimen. He is now off Precedex and more alert.     Nutrition:   NPO Diet  Advance Directives:   Code Status and Medical Interventions:   Ordered at: 01/19/21 7216     Level Of Support Discussed With:    Patient     Code  Status:    CPR     Medical Interventions (Level of Support Prior to Arrest):    Full       Active Hospital Problems    Diagnosis   • **Acute pancreatitis. Alcoholic +/- hyperlipidemia induced   • Alcohol withdrawal    • Hypertriglyceridemia   • Fatty liver   • Acute hypoxemic respiratory failure requiring NIV. ICU transfer 1/22/2021 (CMS/[facility])   • Fever and increased procalcitonin. ? source   • Class 3 severe obesity in adult    • LIANA   • Alcohol abuse   • Essential hypertension   • GERD without esophagitis       Assessment / Plan:    Continue to monitor in the ICU.   Continue current sedation regimen per Neurology. Appreciate their assistance.      Continue tube feeds and free water. Stop tube feeds when able to take PO intake.   Speech to evaluate.     Continue thiamine  Bowel regimen per GI   AM labs      High level of risk due to:  severe exacerbation of chronic illness and illness with threat to life or bodily function.    Plan of care and goals reviewed during interdisciplinary rounds.  I discussed the patient's findings and my recommendations with patient and nursing staff      Jeanie Page, DO    Intensive Care Medicine and Pulmonary Medicine

## 2021-02-09 NOTE — PLAN OF CARE
Goal Outcome Evaluation:  Plan of Care Reviewed With: patient, daughter  Progress: improving  Outcome Summary: Weaned off precedex this am. Pt is A&Ox4. Speech has also improved. SLP saw today and approved for ice chips, FEES tomorrow. PT/OT worked with pt and got to chair with assistance. Plan to attempt walking with walker tomorrow. Roxicodone changed to PRN per Neuro. Pt had 1 bm and adequate UOP today. c/o nausea once and abd and gas pain this evening, tx'd per orders. Held TF at 1800 d/t pts increasing abd pain. Replacing Mg again. Overall, pt has much improved today, able to get up with assist x2 to BSC several times to attempt bm. repositions self well in bed. VSS

## 2021-02-09 NOTE — THERAPY EVALUATION
Acute Care - Speech Language Pathology   Swallow Initial Evaluation [facility]   Clinical Swallow Evaluation     Patient Name: Titus Bro  : 1975  MRN: [id number]  Today's Date: 2021        Referring Physician: DO Camilo      Admit Date: 2021    Visit Dx:     ICD-10-CM ICD-9-CM   1. Acute pancreatitis, unspecified complication status, unspecified pancreatitis type  K85.90 577.0     Patient Active Problem List   Diagnosis   • Acute pancreatitis. Alcoholic +/- hyperlipidemia induced   • Alcohol abuse   • Lactic acidosis   • Essential hypertension   • GERD without esophagitis   • Class 3 severe obesity in adult    • LIANA   • Alcohol withdrawal    • Hypertriglyceridemia   • Fatty liver   • Acute hypoxemic respiratory failure requiring NIV. ICU transfer 2021 (CMS/[facility])   • Fever and increased procalcitonin. ? source     Past Medical History:   Diagnosis Date   • Anxiety    • Arthritis    • Asthma    • COPD (chronic obstructive pulmonary disease) (CMS/HCC)    • GERD (gastroesophageal reflux disease)    • H/O chest tube placement    • Hypertension    • Seizures (CMS/HCC)     WITHDRAWAL     Past Surgical History:   Procedure Laterality Date   • KNEE ARTHROSCOPY Bilateral    • WRIST ARTHROPLASTY Left         SWALLOW EVALUATION (last 72 hours)      SLP Adult Swallow Evaluation     Row Name 21 1315                   Rehab Evaluation    Document Type  evaluation  -MP        Subjective Information  no complaints  -MP        Patient Observations  alert;cooperative  -MP        Patient/Family/Caregiver Comments/Observations  Dtr present  -MP        Care Plan Review  evaluation/treatment results reviewed;patient/other agree to care plan  -MP        Care Plan Review, Other Participant(s)  daughter  -MP        Patient Effort  good  -MP           General Information    Patient Profile Reviewed  yes  -MP        Pertinent History Of Current Problem  Adm w/ acute pancreatitis, alcoholic +/- hyperlipidemia  "induced. LIANA. Respiratory failure, intubated 1/24-1/31. Alcohol w/d. Hx COPD & GERD.  -MP        Current Method of Nutrition  NPO;nasogastric feedings  -MP        Precautions/Limitations, Vision  WFL;for purposes of eval  -MP        Precautions/Limitations, Hearing  WFL;for purposes of eval  -MP        Prior Level of Function-Communication  WFL  -MP        Prior Level of Function-Swallowing  no diet consistency restrictions  -MP        Plans/Goals Discussed with  patient;family;agreed upon  -MP        Barriers to Rehab  medically complex  -MP        Patient's Goals for Discharge  -- \"sprite\"  -MP        Family Goals for Discharge  patient able to return to PO diet  -MP           Pain    Additional Documentation  Pain Scale: FACES Pre/Post-Treatment (Group)  -MP           Pain Scale: FACES Pre/Post-Treatment    Pain: FACES Scale, Pretreatment  2-->hurts little bit  -MP        Posttreatment Pain Rating  2-->hurts little bit  -MP           Oral Motor Structure and Function    Dentition Assessment  natural, present and adequate  -MP        Secretion Management  WNL/WFL  -MP        Mucosal Quality  moist, healthy  -MP           Oral Musculature and Cranial Nerve Assessment    Oral Motor General Assessment  generalized oral motor weakness  -MP           General Eating/Swallowing Observations    Respiratory Support Currently in Use  nasal cannula  -MP        Eating/Swallowing Skills  fed by SLP  -MP        Positioning During Eating  upright in chair  -MP        Utensils Used  spoon;cup;straw  -MP        Consistencies Trialed  thin liquids;pureed  -MP           Clinical Swallow Eval    Pharyngeal Phase  suspected pharyngeal impairment  -MP        Clinical Swallow Evaluation Summary  Wet vocal quality w/ consecutive straw sips thin & incr'd RR w/ PO. Plan for FEES tomorrow 2/10 to further assess. OK for ice chips until FEES.  -MP           Pharyngeal Phase Concerns    Pharyngeal Phase Concerns  wet vocal quality  -MP        " Wet Vocal Quality  thin  -MP        Pharyngeal Phase Concerns, Comment  incr'd RR  -MP           Clinical Impression    SLP Swallowing Diagnosis  suspected pharyngeal dysphagia  -MP        Functional Impact  risk of aspiration/pneumonia  -MP        Rehab Potential/Prognosis, Swallowing  good, to achieve stated therapy goals  -MP        Swallow Criteria for Skilled Therapeutic Interventions Met  demonstrates skilled criteria  -MP           Recommendations    Predicted Duration Therapy Intervention (Days)  until discharge  -MP        SLP Diet Recommendation  NPO;temporary alternate methods of nutrition/hydration;other (see comments) OK for few ice chips/hr as tolerated  -MP        Recommended Diagnostics  FEES;other (see comments) 2/10  -MP        Oral Care Recommendations  Oral Care BID/PRN  -MP        SLP Rec. for Method of Medication Administration  meds via alternate route  -MP        Anticipated Discharge Disposition (SLP)  unknown;anticipate therapy at next level of care  -          User Key  (r) = Recorded By, (t) = Taken By, (c) = Cosigned By    Initials Name Effective Dates    Santos Pendleton, MS CCC-SLP 06/19/19 -           EDUCATION  The patient has been educated in the following areas:   Dysphagia (Swallowing Impairment) NPO rationale.    SLP Recommendation and Plan  SLP Swallowing Diagnosis: suspected pharyngeal dysphagia  SLP Diet Recommendation: NPO, temporary alternate methods of nutrition/hydration, other (see comments)(OK for few ice chips/hr as tolerated)     SLP Rec. for Method of Medication Administration: meds via alternate route        Recommended Diagnostics: FEES, other (see comments)(2/10)  Swallow Criteria for Skilled Therapeutic Interventions Met: demonstrates skilled criteria  Anticipated Discharge Disposition (SLP): unknown, anticipate therapy at next level of care  Rehab Potential/Prognosis, Swallowing: good, to achieve stated therapy goals     Predicted Duration Therapy  Intervention (Days): until discharge                         Plan of Care Reviewed With: patient           Time Calculation:   Time Calculation- SLP     Row Name 02/09/21 1434             Time Calculation- SLP    SLP Start Time  1315  -MP      SLP Received On  02/09/21  -MP        User Key  (r) = Recorded By, (t) = Taken By, (c) = Cosigned By    Initials Name Provider Type    Santos Pendleton MS CCC-SLP Speech and Language Pathologist          Therapy Charges for Today     Code Description Service Date Service Provider Modifiers Qty    [id number] HC ST EVAL ORAL PHARYNG SWALLOW 4 2/9/2021 Santos Lazcano MS CCC-SLP GN 1        Patient was not wearing a face mask and did not exhibit coughing during this therapy encounter.  Procedure performed was aerosolizing, involved close contact (within 6 feet for at least 15 minutes or longer), and did not involve contact with infectious secretions or specimens.  Therapist used appropriate personal protective equipment including gloves, standard procedure mask and eye protection.  Appropriate PPE was worn during the entire therapy session.  Hand hygiene was completed before and after therapy session.        MS SUHA Contreras  2/9/2021

## 2021-02-09 NOTE — PLAN OF CARE
Goal Outcome Evaluation:  Plan of Care Reviewed With: patient        SLP evaluation completed. Will  plan for FEES tomorrow to further assess . Please see note for further details and recommendations.

## 2021-02-09 NOTE — PROGRESS NOTES
Neurology       Patient Care Team:  Chema Greene MD as PCP - General (Internal Medicine)    Chief complaint: Alcohol withdrawal    History: The patient is awake alert and conversational.    Is tolerating a Valium 10 mg 4 times daily without difficulty.  He is needed with a single as needed 5 mg dose yesterday and today.    Was seen today he was getting out of bed with the physical therapist and weightbearing.    Complains of some mild abdominal pain but nothing excruciating.    He admits to 1/5 of [place] on a big day.  Past Medical History:   Diagnosis Date   • Anxiety    • Arthritis    • Asthma    • COPD (chronic obstructive pulmonary disease) (CMS/[facility])    • GERD (gastroesophageal reflux disease)    • H/O chest tube placement    • Hypertension    • Seizures (CMS/[facility])     WITHDRAWAL       Vital Signs   Vitals:    02/09/21 0700 02/09/21 0800 02/09/21 0900 02/09/21 1000   BP: 127/70 133/78 107/78 132/75   BP Location:  Left arm     Patient Position:  Lying     Pulse: 82 77 73 74   Resp:  24     Temp:  100.1 °F (37.8 °C)     TempSrc:  Axillary     SpO2: 94% 97% 92% 94%   Weight:       Height:           Physical Exam:   General: Pleasant              Neuro: Calm and cooperative.    Oriented to person and place.    Speech is soft and articulate.    He is able to stand with assistance.    He moves all extremities well.        Results Review:  Labs reviewed  Results from last 7 days   Lab Units 02/09/21  0321   WBC 10*3/mm3 10.49   HEMOGLOBIN g/dL 10.8*   HEMATOCRIT % 36.8*   PLATELETS 10*3/mm3 720*     Results from last 7 days   Lab Units 02/09/21  0906 02/08/21  0330 02/07/21  0319  02/05/21  0424  02/03/21  0352   SODIUM mmol/L 138 138 140   < > 143   < > 146*   POTASSIUM mmol/L 4.5 3.9 3.9   < > 3.9   < > 4.2   CHLORIDE mmol/L 99 99 99   < > 103   < > 106   CO2 mmol/L 32.0* 30.0* 31.0*   < > 32.0*   < > 32.0*   BUN mg/dL 17 14 17   < > 14   < > 14   CREATININE mg/dL 0.58* 0.55* 0.63*   < > 0.53*   < > 0.62*    CALCIUM mg/dL 9.6 9.1 9.1   < > 9.0   < > 9.1   BILIRUBIN mg/dL  --   --   --   --  0.2  --  0.3   ALK PHOS U/L  --   --   --   --  62  --  73   ALT (SGPT) U/L  --   --   --   --  16  --  21   AST (SGOT) U/L  --   --   --   --  22  --  38   GLUCOSE mg/dL 131* 122* 103*   < > 126*   < > 115*    < > = values in this interval not displayed.       Imaging Results (Last 24 Hours)     Procedure Component Value Units Date/Time    XR Abdomen KUB [[id number]] Collected: 02/09/21 0640     Updated: 02/09/21 0642    Narrative:      CR Abdomen 1 Vw, CR Abdomen 1 Vw    INDICATION:   Acute pancreatitis. Keofeed placement    COMPARISON:   2/5/2021    FINDINGS:  AP radiographs of the abdomen. Nasogastric tube is demonstrated with the tip overlying the left upper quadrant of the abdomen. A Keofeed tube is demonstrated overlying the expected location of the proximal duodenum.    The bowel pattern is nonobstructive. No free air is identified. No clearly pathologic calcifications.        Impression:      Nasogastric tube and Keofeed tube in position as described.    Signer Name: Dax Valle MD   Signed: 2/9/2021 6:40 AM   Workstation [facility]: [facility]    [facility]    XR Abdomen KUB [[id number]] Collected: 02/09/21 0640     Updated: 02/09/21 0642    Narrative:      CR Abdomen 1 Vw, CR Abdomen 1 Vw    INDICATION:   Acute pancreatitis. Keofeed placement    COMPARISON:   2/5/2021    FINDINGS:  AP radiographs of the abdomen. Nasogastric tube is demonstrated with the tip overlying the left upper quadrant of the abdomen. A Keofeed tube is demonstrated overlying the expected location of the proximal duodenum.    The bowel pattern is nonobstructive. No free air is identified. No clearly pathologic calcifications.        Impression:      Nasogastric tube and Keofeed tube in position as described.    Signer Name: Dax Valle MD   Signed: 2/9/2021 6:40 AM   Workstation Name: [facility]    [facility]   [place]          Assessment:  Alcohol withdrawal, improved    Plan:  Continue current regimen.    Decrease oxycodone to 5 mg every 6 hours as needed for pain      Speech to see regarding his swallowing    Comment:  Will give a few days to settle down and continue to wean medication.          I discussed the patients findings and my recommendations with patient, nursing staff and primary care team    Ángel Rios MD  02/09/21  12:02 EST

## 2021-02-09 NOTE — THERAPY WOUND CARE TREATMENT
Acute Care - Wound/Debridement Treatment Note/Discharge   [place]     Patient Name: Titus Bro  : 1975  MRN: [id number]  Today's Date: 2021         Referring Physician: DO Camilo          Admit Date: 2021    Visit Dx:    ICD-10-CM ICD-9-CM   1. Acute pancreatitis, unspecified complication status, unspecified pancreatitis type  K85.90 577.0       Patient Active Problem List   Diagnosis   • Acute pancreatitis. Alcoholic +/- hyperlipidemia induced   • Alcohol abuse   • Lactic acidosis   • Essential hypertension   • GERD without esophagitis   • Class 3 severe obesity in adult    • LIANA   • Alcohol withdrawal    • Hypertriglyceridemia   • Fatty liver   • Acute hypoxemic respiratory failure requiring NIV. ICU transfer 2021 (CMS/[facility])   • Fever and increased procalcitonin. ? source        Past Medical History:   Diagnosis Date   • Anxiety    • Arthritis    • Asthma    • COPD (chronic obstructive pulmonary disease) (CMS/[facility])    • GERD (gastroesophageal reflux disease)    • H/O chest tube placement    • Hypertension    • Seizures (CMS/[facility])     WITHDRAWAL        Past Surgical History:   Procedure Laterality Date   • KNEE ARTHROSCOPY Bilateral    • WRIST ARTHROPLASTY Left          LDA Wound     Row Name               Wound 21  Left foot    Wound - Properties Group Placement Date: 21  -JH Placement Time: --  -JH, present on arrival to unit  Side: Left  -JH Location: foot  -JH Stage, Pressure Injury : unstageable  -JH    Retired Wound - Properties Group Date first assessed: 21  -JH Time first assessed: --  -JH, present on arrival to unit  Side: Left  -JH Location: foot  -JH       Wound 21 0800 Left gluteal    Wound - Properties Group Placement Date: 21  -DA Placement Time: 0800  -DA Present on Hospital Admission: N  -DA Side: Left  -DA Location: gluteal  -DA Stage, Pressure Injury : Stage 2  -DA    Retired Wound - Properties Group Date first assessed: 21  -DA Time first  assessed: 0800  -DA Present on Hospital Admission: N  -DA Side: Left  -DA Location: gluteal  -DA       Wound 02/03/21 0800 Right gluteal    Wound - Properties Group Placement Date: 02/03/21  - Placement Time: 0800  -DA Present on Hospital Admission: N  -DA Side: Right  -DA Location: gluteal  -DA Stage, Pressure Injury : deep tissue injury  -DA    Retired Wound - Properties Group Date first assessed: 02/03/21  - Time first assessed: 0800  -DA Present on Hospital Admission: N  -DA Side: Right  -DA Location: gluteal  -DA      User Key  (r) = Recorded By, (t) = Taken By, (c) = Cosigned By    Initials Name Provider Type    Dilip Blanco RN Registered Nurse    Star Mcduffie RN Registered Nurse            WOUND DEBRIDEMENT  Total area of Debridement: nonselective  Debridement Site 1  Location- Site 1: B buttocks  Selective Debridement- Site 1: Wound Surface <20cmsq  Instruments- Site 1: other (comment)(cleansing wipes)  Excised Tissue Description- Site 1: minimum, other (comment)(nonviable crusts over intact skin)  Bleeding- Site 1: none                  PT Assessment (last 12 hours)      PT Evaluation and Treatment     Row Name 02/09/21 0920          Physical Therapy Time and Intention    Subjective Information  no complaints  -     Document Type  wound care;therapy note (daily note);discharge treatment  -     Mode of Treatment  physical therapy;individual therapy  -     Row Name 02/09/21 0920          Cognition    Behavioral Issues (Cognitive)  unable/difficult to assess  -     Row Name 02/09/21 0920          Pain    Additional Documentation  Pain Scale: FACES Pre/Post-Treatment (Group)  -     Row Name 02/09/21 0920          Pain Scale: FACES Pre/Post-Treatment    Pain: FACES Scale, Pretreatment  0-->no hurt  -     Posttreatment Pain Rating  0-->no hurt  -     Row Name             Wound 01/22/21  Left foot    Wound - Properties Group Placement Date: 01/22/21  - Placement Time: --  -KAYCEE,  present on arrival to unit  Side: Left  -JH Location: foot  -JH Stage, Pressure Injury : unstageable  -JH    Retired Wound - Properties Group Date first assessed: 01/22/21  - Time first assessed: --  -JH, present on arrival to unit  Side: Left  -JH Location: foot  -JH    Row Name 02/09/21 0920          Wound 02/03/21 0800 Left gluteal    Wound - Properties Group Placement Date: 02/03/21  -DA Placement Time: 0800  -DA Present on Hospital Admission: N  -DA Side: Left  -DA Location: gluteal  -DA Stage, Pressure Injury : Stage 2  -DA    Base  closed/resurfaced;pink;other (see comments) some dark crust over intact skin, pink closed areas  -MC     Periwound  intact;dry;warm  -MC     Periwound Temperature  warm  -MC     Periwound Skin Turgor  soft  -MC     Edges  rolled/closed  -MC     Drainage Amount  none  -MC     Care, Wound  cleansed with;wound cleanser;debrided  -MC     Dressing Care  dressing changed;low-adherent;foam;border dressing  -MC     Periwound Care  barrier ointment applied  -     Retired Wound - Properties Group Date first assessed: 02/03/21  -DA Time first assessed: 0800  -DA Present on Hospital Admission: N  -DA Side: Left  -DA Location: gluteal  -DA    Row Name 02/09/21 0920          Wound 02/03/21 0800 Right gluteal    Wound - Properties Group Placement Date: 02/03/21  -DA Placement Time: 0800  -DA Present on Hospital Admission: N  -DA Side: Right  -DA Location: gluteal  -DA Stage, Pressure Injury : deep tissue injury  -DA    Base  clean;dry;pink;closed/resurfaced some dark crust over intact skin, pink closed areas  -MC     Periwound  intact;dry  -MC     Periwound Temperature  warm  -MC     Periwound Skin Turgor  soft  -MC     Edges  rolled/closed  -MC     Drainage Amount  none  -MC     Care, Wound  cleansed with;wound cleanser;debrided  -MC     Dressing Care  dressing changed;low-adherent;foam;border dressing  -MC     Periwound Care  barrier ointment applied  -     Retired Wound - Properties  Group Date first assessed: 02/03/21  -DA Time first assessed: 0800  -DA Present on Hospital Admission: N  -DA Side: Right  -DA Location: gluteal  -DA    Row Name 02/09/21 0920          Coping    Observed Emotional State  calm;cooperative  -MC     Row Name 02/09/21 0920          Plan of Care Review    Plan of Care Reviewed With  patient  -     Progress  improving  -     Outcome Summary  Pt's gluteal wounds are closed at this time. There is fragile pink skin in previously open areas, as well as dark, dry, thin crusting over other areas of fragile skin. Current off-loading and skin care seems appropriate for ongoing progress. MIST does not appear to be warranted at this time. PT wound care will sign off, please re-consult with additional PT wound care needs.  -     Row Name 02/09/21 0920          Wound Care Goal 1 (PT)    Wound Care Goal 1 (PT)  Buttock skin to remain stable or improve with off loading and foam dressing   -     Progress/Outcome (Wound Care Goal 1, PT)  goal met  -     Row Name 02/09/21 0920          Positioning and Restraints    Pre-Treatment Position  in bed  -     Post Treatment Position  bed  -     In Bed  side lying left;with nsg  -     Row Name 02/09/21 0920          Progress Summary (PT)    Reason for Discharge (PT)  patient met all goals and outcomes wound care goals met  -     Row Name 02/09/21 0920          Discharge Summary (PT)    Additional Documentation  Discharge Summary (PT) (Group)  -     Row Name 02/09/21 0920          Discharge Summary (PT)    Outcomes Achieved/Progress Made Upon Discharge (PT)  all goals met within established timeframes  -       User Key  (r) = Recorded By, (t) = Taken By, (c) = Cosigned By    Initials Name Provider Type    Mercy Leger PT Physical Therapist    Dilip Blanco, RN Registered Nurse    Star Mcduffie RN Registered Nurse        Physical Therapy Education                 Title: PT OT SLP Therapies (In Progress)      Topic: Physical Therapy (In Progress)     Point: Mobility training (In Progress)     Learning Progress Summary           Patient Acceptance, E,D, NR by  at 2/5/2021 1021                   Point: Home exercise program (Not Started)     Learner Progress:  Not documented in this visit.          Point: Body mechanics (In Progress)     Learning Progress Summary           Patient Acceptance, E,D, NR by  at 2/5/2021 1021                   Point: Precautions (In Progress)     Learning Progress Summary           Patient Acceptance, E,D, NR by  at 2/5/2021 1021                               User Key     Initials Effective Dates Name Provider Type [facility] 06/19/15 -  Martine Arciniega PT Physical Therapist PT                  Recommendation and Plan  Anticipated Discharge Disposition (PT): inpatient rehabilitation facility  Planned Therapy Interventions (PT): balance training, bed mobility training, gait training, home exercise program, patient/family education, strengthening, transfer training  Therapy Frequency (PT): daily    Plan of Care Reviewed With: patient   Progress: improving  Outcome Summary: Pt's gluteal wounds are closed at this time. There is fragile pink skin in previously open areas, as well as dark, dry, thin crusting over other areas of fragile skin. Current off-loading and skin care seems appropriate for ongoing progress. MIST does not appear to be warranted at this time. PT wound care will sign off, please re-consult with additional PT wound care needs.                  Time Calculation  PT Charges     Row Name 02/09/21 0920             Time Calculation    Start Time  0920  -      PT Goal Re-Cert Due Date  02/15/21  -        User Key  (r) = Recorded By, (t) = Taken By, (c) = Cosigned By    Initials Name Provider Type    Mercy Leger PT Physical Therapist          Therapy Charges for Today     Code Description Service Date Service Provider Modifiers Qty    [id number]   NONSELECTIVE DEBRIDEMENT 2/9/2021 Mercy Smith, PT GP 1            PT G-Codes  Outcome Measure Options: AM-PAC 6 Clicks Daily Activity (OT)  AM-PAC 6 Clicks Score (PT): 8  AM-PAC 6 Clicks Score (OT): 7                Mercy Smith, PT  2/9/2021

## 2021-02-09 NOTE — SIGNIFICANT NOTE
02/09/21 0824   SLP Deferred Reason   SLP Deferred Reason Routine  (Spoke w/ RN - pt not appropriate for SLP eval this AM. RN to notify SLP if any changes/becomes appropriate t/o the day.)

## 2021-02-09 NOTE — THERAPY TREATMENT NOTE
Patient Name: Titus Bro  : 1975    MRN: [id number]                              Today's Date: 2021       Admit Date: 2021    Visit Dx:     ICD-10-CM ICD-9-CM   1. Acute pancreatitis, unspecified complication status, unspecified pancreatitis type  K85.90 577.0     Patient Active Problem List   Diagnosis   • Acute pancreatitis. Alcoholic +/- hyperlipidemia induced   • Alcohol abuse   • Lactic acidosis   • Essential hypertension   • GERD without esophagitis   • Class 3 severe obesity in adult    • LIANA   • Alcohol withdrawal    • Hypertriglyceridemia   • Fatty liver   • Acute hypoxemic respiratory failure requiring NIV. ICU transfer 2021 (CMS/[facility])   • Fever and increased procalcitonin. ? source     Past Medical History:   Diagnosis Date   • Anxiety    • Arthritis    • Asthma    • COPD (chronic obstructive pulmonary disease) (CMS/[facility])    • GERD (gastroesophageal reflux disease)    • H/O chest tube placement    • Hypertension    • Seizures (CMS/[facility])     WITHDRAWAL     Past Surgical History:   Procedure Laterality Date   • KNEE ARTHROSCOPY Bilateral    • WRIST ARTHROPLASTY Left      General Information     Row Name 21 1401          OT Time and Intention    Document Type  therapy note (daily note)  -CL     Mode of Treatment  occupational therapy  -CL     Row Name 21 1401          General Information    Existing Precautions/Restrictions  fall;oxygen therapy device and L/min;seizures;other (see comments) NG  -CL     Barriers to Rehab  medically complex  -CL     Row Name 21 1401          Cognition    Orientation Status (Cognition)  oriented x 3  -CL     Row Name 21 1401          Safety Issues, Functional Mobility    Impairments Affecting Function (Mobility)  balance;endurance/activity tolerance;strength  -CL       User Key  (r) = Recorded By, (t) = Taken By, (c) = Cosigned By    Initials Name Provider Type    CL Susannah Ronquillo OT Occupational Therapist          Mobility/ADL's      [facility] Name 02/09/21 1402          Transfers    Transfers  bed-chair transfer;sit-stand transfer  -CL     Comment (Transfers)  BUE support w/ B knees blocked  -CL     Bed-Chair [place] (Transfers)  dependent (less than 25% patient effort);2 person assist;verbal cues  -CL     Assistive Device (Bed-Chair Transfers)  lift device  -CL     Sit-Stand [place] (Transfers)  moderate assist (50% patient effort);2 person assist;verbal cues  -CL     Row Name 02/09/21 1402          Activities of Daily Living    BADL Assessment/Intervention  lower body dressing  -CL     Row Name 02/09/21 1402          Lower Body Dressing Assessment/Training    [place] Level (Lower Body Dressing)  don;socks;dependent (less than 25% patient effort)  -CL     Position (Lower Body Dressing)  supine  -CL       User Key  (r) = Recorded By, (t) = Taken By, (c) = Cosigned By    Initials Name Provider Type    CL Susannah Ronquillo OT Occupational Therapist        Obj/Interventions     Row Name 02/09/21 1402          Balance    Balance Assessment  sitting static balance;sitting dynamic balance;standing static balance  -CL     Static Sitting Balance  WFL;sitting in chair  -CL     Dynamic Sitting Balance  WFL;sitting in chair  -CL     Static Standing Balance  moderate impairment;supported  -CL       User Key  (r) = Recorded By, (t) = Taken By, (c) = Cosigned By    Initials Name Provider Type    CL Susannah Ronquillo OT Occupational Therapist        Goals/Plan    No documentation.       Clinical Impression     Row Name 02/09/21 1403          Pain Assessment    Additional Documentation  Pain Scale: FACES Pre/Post-Treatment (Group)  -CL     Row Name 02/09/21 1403          Pain Scale: Numbers Pre/Post-Treatment    Pain Intervention(s)  Repositioned;Ambulation/increased activity  -CL     Row Name 02/09/21 1403          Pain Scale: FACES Pre/Post-Treatment    Pain: FACES Scale, Pretreatment  2-->hurts little bit  -CL     Posttreatment Pain Rating  2-->hurts  little bit  -CL     Pain Location - Orientation  generalized  -CL     Pre/Posttreatment Pain Comment  tolerated  -CL     Row Name 02/09/21 1403          Plan of Care Review    Plan of Care Reviewed With  patient  -CL     Progress  improving  -CL     Outcome Summary  Pt demo significantly improved alertness and command following this date. Pt is Dep for LB ADL performance and Mod Ax2 for STS t/f. Pt limited d/t generalized weakness though demo excellent effort. Recommend cont skilled IPOT POC. Recommend pt DC to IP rehab.  -CL     Row Name 02/09/21 1403          Therapy Plan Review/Discharge Plan (OT)    Anticipated Discharge Disposition (OT)  inpatient rehabilitation facility  -CL     Row Name 02/09/21 1403          Vital Signs    Pre Systolic BP Rehab  -- VSS  -CL     Pre Patient Position  Supine  -CL     Intra Patient Position  Standing  -CL     Post Patient Position  Sitting  -CL     Row Name 02/09/21 1405 02/09/21 1403       Positioning and Restraints    Pre-Treatment Position  --  in bed  -CL    Post Treatment Position  --  chair  -CL    In Chair  notified nsg;reclined;call light within reach;encouraged to call for assist;exit alarm on;with nsg;waffle cushion;RUE elevated;LUE elevated;legs elevated  -CL  notified nsg;sitting;call light within reach;encouraged to call for assist;exit alarm on;with PT;with nsg  -CL      User Key  (r) = Recorded By, (t) = Taken By, (c) = Cosigned By    Initials Name Provider Type    CL Susannah Ronquillo, OT Occupational Therapist        Outcome Measures     Row Name 02/09/21 1404          How much help from another is currently needed...    Putting on and taking off regular lower body clothing?  1  -CL     Bathing (including washing, rinsing, and drying)  2  -CL     Toileting (which includes using toilet bed pan or urinal)  1  -CL     Putting on and taking off regular upper body clothing  2  -CL     Taking care of personal grooming (such as brushing teeth)  3  -CL     Eating meals  1   -CL     AM-PAC 6 Clicks Score (OT)  10  -CL     Row Name 02/09/21 1404          Functional Assessment    Outcome Measure Options  AM-PAC 6 Clicks Daily Activity (OT)  -CL       User Key  (r) = Recorded By, (t) = Taken By, (c) = Cosigned By    Initials Name Provider Type    CL Susannah Ronquillo OT Occupational Therapist        Occupational Therapy Education                 Title: PT OT SLP Therapies (In Progress)     Topic: Occupational Therapy (In Progress)     Point: ADL training (In Progress)     Description:   Instruct learner(s) on proper safety adaptation and remediation techniques during self care or transfers.   Instruct in proper use of assistive devices.              Learning Progress Summary           Patient Acceptance, E, NR by CL at 2/9/2021 1405    Acceptance, E, NR by CL at 2/5/2021 1134                   Point: Home exercise program (Not Started)     Description:   Instruct learner(s) on appropriate technique for monitoring, assisting and/or progressing therapeutic exercises/activities.              Learner Progress:  Not documented in this visit.          Point: Precautions (In Progress)     Description:   Instruct learner(s) on prescribed precautions during self-care and functional transfers.              Learning Progress Summary           Patient Acceptance, E, NR by CL at 2/9/2021 1405    Acceptance, E, NR by CL at 2/5/2021 1134                   Point: Body mechanics (In Progress)     Description:   Instruct learner(s) on proper positioning and spine alignment during self-care, functional mobility activities and/or exercises.              Learning Progress Summary           Patient Acceptance, E, NR by CL at 2/9/2021 1405    Acceptance, E, NR by CL at 2/5/2021 1134                               User Key     Initials Effective Dates Name Provider Type Discipline     04/03/18 -  Susannah Ronquillo OT Occupational Therapist OT              OT Recommendation and Plan  Therapy Frequency (OT): daily  Plan  of Care Review  Plan of Care Reviewed With: patient  Progress: improving  Outcome Summary: Pt demo significantly improved alertness and command following this date. Pt is Dep for LB ADL performance and Mod Ax2 for STS t/f. Pt limited d/t generalized weakness though demo excellent effort. Recommend cont skilled IPOT POC. Recommend pt DC to IP rehab.     Time Calculation:   Time Calculation- OT     Row Name 02/09/21 1407             Time Calculation- OT    OT Start Time  1150  -CL      OT Received On  02/09/21  -CL      OT Goal Re-Cert Due Date  02/15/21  -CL         Timed Charges    70044 - OT Therapeutic Activity Minutes  10  -CL        User Key  (r) = Recorded By, (t) = Taken By, (c) = Cosigned By    Initials Name Provider Type    CL Susannah Ronquillo OT Occupational Therapist        Therapy Charges for Today     Code Description Service Date Service Provider Modifiers Qty    37715649523 HC OT THERAPEUTIC ACT EA 15 MIN 2/9/2021 Susannah Ronquillo OT GO 1               Susannah Ronquillo OT  2/9/2021

## 2021-02-09 NOTE — PLAN OF CARE
Goal Outcome Evaluation:  Plan of Care Reviewed With: patient  Progress: improving  Outcome Summary: Pt demo significantly improved alertness and command following this date. Pt is Dep for LB ADL performance and Mod Ax2 for STS t/f. Pt limited d/t generalized weakness though demo excellent effort. Recommend cont skilled IPOT POC. Recommend pt DC to IP rehab.

## 2021-02-09 NOTE — THERAPY TREATMENT NOTE
Patient Name: Titus Bro  : 1975    MRN: [id number]                              Today's Date: 2021       Admit Date: 2021    Visit Dx:     ICD-10-CM ICD-9-CM   1. Acute pancreatitis, unspecified complication status, unspecified pancreatitis type  K85.90 577.0     Patient Active Problem List   Diagnosis   • Acute pancreatitis. Alcoholic +/- hyperlipidemia induced   • Alcohol abuse   • Lactic acidosis   • Essential hypertension   • GERD without esophagitis   • Class 3 severe obesity in adult    • LIANA   • Alcohol withdrawal    • Hypertriglyceridemia   • Fatty liver   • Acute hypoxemic respiratory failure requiring NIV. ICU transfer 2021 (CMS/[facility])   • Fever and increased procalcitonin. ? source     Past Medical History:   Diagnosis Date   • Anxiety    • Arthritis    • Asthma    • COPD (chronic obstructive pulmonary disease) (CMS/[facility])    • GERD (gastroesophageal reflux disease)    • H/O chest tube placement    • Hypertension    • Seizures (CMS/[facility])     WITHDRAWAL     Past Surgical History:   Procedure Laterality Date   • KNEE ARTHROSCOPY Bilateral    • WRIST ARTHROPLASTY Left      General Information     Row Name 21 1411          Physical Therapy Time and Intention    Document Type  therapy note (daily note)  -     Mode of Treatment  physical therapy  -     Row Name 21 1411          General Information    Existing Precautions/Restrictions  fall;oxygen therapy device and L/min NG  -     Row Name 21 1411          Cognition    Orientation Status (Cognition)  oriented x 4  -     Row Name 21 1411          Safety Issues, Functional Mobility    Impairments Affecting Function (Mobility)  balance;endurance/activity tolerance;strength;shortness of breath;postural/trunk control;motor control  -       User Key  (r) = Recorded By, (t) = Taken By, (c) = Cosigned By    Initials Name Provider Type    LS Martine Arciniega PT Physical Therapist        Mobility     Row Name 21  1412          Bed Mobility    Comment (Bed Mobility)  [facility] lift under pt upon arrival  -     Row Name 02/09/21 1412          Transfers    Comment (Transfers)  STS x2 from recliner with BUE support, blocking knees, and rocking forward to initiate transfer.  -     Row Name 02/09/21 1412          Bed-Chair Transfer    Bed-Chair [place] (Transfers)  dependent (less than 25% patient effort);2 person assist;verbal cues  -     Assistive Device (Bed-Chair Transfers)  lift device  -     Row Name 02/09/21 1412          Sit-Stand Transfer    Sit-Stand [place] (Transfers)  moderate assist (50% patient effort);2 person assist;verbal cues  -     Row Name 02/09/21 1412          Gait/Stairs (Locomotion)    [place] Level (Gait)  unable to assess  -     Comment (Gait/Stairs)  performed pre-gait weightshifting at edge of recliner  -       User Key  (r) = Recorded By, (t) = Taken By, (c) = Cosigned By    Initials Name Provider Type     Martine Arciniega, PT Physical Therapist        Obj/Interventions     Row Name 02/09/21 1413          Motor Skills    Therapeutic Exercise  knee;ankle  -[facility] Name 02/09/21 1413          Knee (Therapeutic Exercise)    Knee (Therapeutic Exercise)  strengthening exercise  -     Knee Strengthening (Therapeutic Exercise)  bilateral;marching while seated;LAQ (long arc quad);10 repetitions;sitting  -[facility] Name 02/09/21 1413          Ankle (Therapeutic Exercise)    Ankle (Therapeutic Exercise)  AROM (active range of motion)  -     Ankle AROM (Therapeutic Exercise)  bilateral;dorsiflexion;plantarflexion;10 repetitions;sitting  -[facility] Name 02/09/21 1413          Balance    Static Sitting Balance  WFL;sitting in chair  -     Static Standing Balance  moderate impairment;supported;standing  -     Balance Interventions  standing;supported;dynamic;weight shifting activity;trunk training exercise  -     Comment, Balance  L/R lateral weightshifting for pre-gait  activity; 40s x2 reps  -       User Key  (r) = Recorded By, (t) = Taken By, (c) = Cosigned By    Initials Name Provider Type    Martine Perrin, PT Physical Therapist        Goals/Plan    No documentation.       Clinical Impression     Row Name 02/09/21 1414          Pain    Additional Documentation  Pain Scale: FACES Pre/Post-Treatment (Group)  -     Row Name 02/09/21 1414          Pain Scale: FACES Pre/Post-Treatment    Pain: FACES Scale, Pretreatment  2-->hurts little bit  -LS     Posttreatment Pain Rating  2-->hurts little bit  -LS     Pain Location  abdomen;back  -LS     Pre/Posttreatment Pain Comment  tolerated; RN aware  -     Row Name 02/09/21 1414          Plan of Care Review    Plan of Care Reviewed With  patient  -LS     Progress  improving  -     Outcome Summary  Pt demonstrated increased indep with sitting balance (CGA). Progressed to STS x2 from recliner with mod x2 A, being able to perform pre-gait R/L weightshifting for 40s x2 reps. Noted significant improvement in cognition and motivation to participate. Will cont PT POC. IP rehab remains appropriate d/c recommendation.  -     Row Name 02/09/21 1414          Vital Signs    Pre Systolic BP Rehab  122  -LS     Pre Treatment Diastolic BP  63  -LS     Posttreatment Heart Rate (beats/min)  86  -LS     O2 Delivery Pre Treatment  nasal cannula  -LS     O2 Delivery Intra Treatment  nasal cannula  -LS     Post SpO2 (%)  95  -LS     O2 Delivery Post Treatment  nasal cannula  -LS     Pre Patient Position  Supine  -LS     Intra Patient Position  Standing  -LS     Post Patient Position  Sitting  -     Row Name 02/09/21 1414          Positioning and Restraints    Pre-Treatment Position  in bed  -LS     Post Treatment Position  chair  -LS     In Chair  notified nsg;reclined;call light within reach;encouraged to call for assist;exit alarm on;legs elevated;waffle cushion;on mechanical lift sling;RUE elevated;LUE elevated;heels elevated  -       User  Key  (r) = Recorded By, (t) = Taken By, (c) = Cosigned By    Initials Name Provider Type    Martine Perrin, PT Physical Therapist        Outcome Measures     Row Name 02/09/21 1418          How much help from another person do you currently need...    Turning from your back to your side while in flat bed without using bedrails?  3  -LS     Moving from lying on back to sitting on the side of a flat bed without bedrails?  2  -LS     Moving to and from a bed to a chair (including a wheelchair)?  1  -LS     Standing up from a chair using your arms (e.g., wheelchair, bedside chair)?  2  -LS     Climbing 3-5 steps with a railing?  1  -LS     To walk in hospital room?  1  -LS     AM-PAC 6 Clicks Score (PT)  10  -LS       User Key  (r) = Recorded By, (t) = Taken By, (c) = Cosigned By    Initials Name Provider Type    Martine Perrin, PT Physical Therapist        Physical Therapy Education                 Title: PT OT SLP Therapies (In Progress)     Topic: Physical Therapy (Done)     Point: Mobility training (Done)     Learning Progress Summary           Patient Acceptance, E,D, VU,NR by  at 2/9/2021 1419    Acceptance, E,D, NR by  at 2/5/2021 1021                   Point: Home exercise program (Done)     Learning Progress Summary           Patient Acceptance, E,D, VU,NR by  at 2/9/2021 1419                   Point: Body mechanics (Done)     Learning Progress Summary           Patient Acceptance, E,D, VU,NR by  at 2/9/2021 1419    Acceptance, E,D, NR by  at 2/5/2021 1021                   Point: Precautions (Done)     Learning Progress Summary           Patient Acceptance, E,D, VU,NR by  at 2/9/2021 1419    Acceptance, E,D, NR by  at 2/5/2021 1021                               User Key     Initials Effective Dates Name Provider Type [facility] 06/19/15 -  Martine Arciniega, PT Physical Therapist PT              PT Recommendation and Plan  Planned Therapy Interventions (PT): balance training, bed  mobility training, gait training, home exercise program, patient/family education, strengthening, transfer training  Plan of Care Reviewed With: patient  Progress: improving  Outcome Summary: Pt demonstrated increased indep with sitting balance (CGA). Progressed to STS x2 from recliner with mod x2 A, being able to perform pre-gait R/L weightshifting for 40s x2 reps. Noted significant improvement in cognition and motivation to participate. Will cont PT POC. IP rehab remains appropriate d/c recommendation.     Time Calculation:   PT Charges     Row Name 02/09/21 1420 02/09/21 0920          Time Calculation    Start Time  1135  -LS  0920  -     PT Received On  02/09/21  -  --     PT Goal Re-Cert Due Date  --  02/15/21  -        Time Calculation- PT    Total Timed Code Minutes- PT  23 minute(s)  -LS  --        Timed Charges    24594 - PT Therapeutic Exercise Minutes  5  -LS  --     49416 - PT Therapeutic Activity Minutes  18  -LS  --       User Key  (r) = Recorded By, (t) = Taken By, (c) = Cosigned By    Initials Name Provider Type     Martine Arciniega, PT Physical Therapist     Mercy Smith, PT Physical Therapist        Therapy Charges for Today     Code Description Service Date Service Provider Modifiers Qty    15695867901 HC PT THER PROC EA 15 MIN 2/9/2021 Martine Arciniega, PT GP 1    96144706113 HC PT THERAPEUTIC ACT EA 15 MIN 2/9/2021 Martine Arciniega, PT GP 1          PT G-Codes  Outcome Measure Options: AM-PAC 6 Clicks Daily Activity (OT)  AM-PAC 6 Clicks Score (PT): 10  AM-PAC 6 Clicks Score (OT): 10    Martine Arciniega, PT  2/9/2021

## 2021-02-09 NOTE — PROGRESS NOTES
Multidisciplinary Rounds    Time: 20min  Patient Name: Titus Bro  Date of Encounter: 02/09/21 09:26 EST  MRN: [id number]  Admission date: 1/19/2021      Reason for visit: MDR. RD to continue to follow per protocol.     Additional information obtained during MDR: Pt tolerating EN, with 92% of EN goal volume delivered over the past 24 hrs. Receiving dulcolax, relistor, and senokot. Pt continues on precedex.     Per I&O over the past 24 hrs:  NGT output= 400 ml  1 bowel movement    Current diet: NPO Diet    EN: Peptamen Intense VHP at 100 ml/hr and free water at 30 ml/hr via NGDT    Intervention:  Follow treatment plan  Care plan reviewed    Follow up:   Per protocol      Lynette Silver MS RD/[facility]  09:23 EST

## 2021-02-09 NOTE — PLAN OF CARE
Goal Outcome Evaluation:  Plan of Care Reviewed With: patient  Progress: improving  Outcome Summary: Pt's gluteal wounds are closed at this time. There is fragile pink skin in previously open areas, as well as dark, dry, thin crusting over other areas of fragile skin. Current off-loading and skin care seems appropriate for ongoing progress. MIST does not appear to be warranted at this time. PT wound care will sign off, please re-consult with additional PT wound care needs.

## 2021-02-09 NOTE — PROGRESS NOTES
"GI Daily Progress Note  Subjective     Debi Rivas is a 46 y.o. male who was admitted with Acute pancreatitis.   Much more awake and alert today.  He sitting on the bedside commode.  Still with complaints of abdominal pain.    Chief Complaint: Abdominal pain    Objective     /75   Pulse 86   Temp 99 °F (37.2 °C) (Axillary)   Resp 20   Ht 188 cm (74.02\")   Wt (!) 206 kg (454 lb 14.4 oz) Comment: per bed scale   SpO2 97%   BMI 58.38 kg/m²     Intake/Output last 3 shifts:  I/O last 3 completed shifts:  In: 4342.9 [I.V.:728.9; Other:809; NG/GT:2805]  Out: 4750 [Urine:4150; Emesis/NG output:600]  Intake/Output this shift:  I/O this shift:  In: 1231 [I.V.:185; Other:210; NG/GT:836]  Out: 175 [Urine:175]      Physical Exam  Wt Readings from Last 3 Encounters:   02/01/21 (!) 206 kg (454 lb 14.4 oz)   06/10/20 (!) 152 kg (335 lb)   08/07/19 (!) 144 kg (318 lb)   ,body mass index is 58.38 kg/m².,@FLOWAMB(6)@,@FLOWAMB(5)@,@FLOWAMB(8)@   CONSTITUTIONAL: Sitting up in no acute distress  Resp CTA; no rhonchi, rales, or wheezes.  Respiration effort normal  CV RRR; no M/R/G. No lower extremity edema  GI Abd soft, mild distended, tender, normal active bowel sounds.  Morbidly obese  Psych: Awake alert and oriented    DATA:Results for DEBI RIVAS (MRN [id number]) as of 2/9/2021 16:26   Ref. Range 2/9/2021 09:06   Glucose Latest Ref Range: 65 - 99 mg/dL 131 (H)   Sodium Latest Ref Range: 136 - 145 mmol/L 138   Potassium Latest Ref Range: 3.5 - 5.2 mmol/L 4.5   CO2 Latest Ref Range: 22.0 - 29.0 mmol/L 32.0 (H)   Chloride Latest Ref Range: 98 - 107 mmol/L 99   Anion Gap Latest Ref Range: 5.0 - 15.0 mmol/L 7.0   Creatinine Latest Ref Range: 0.76 - 1.27 mg/dL 0.58 (L)   BUN Latest Ref Range: 6 - 20 mg/dL 17   FINDINGS:  AP radiographs of the abdomen. Nasogastric tube is demonstrated with the tip overlying the left upper quadrant of the abdomen. A Keofeed tube is demonstrated overlying the expected location of the proximal " duodenum.     The bowel pattern is nonobstructive. No free air is identified. No clearly pathologic calcifications.        IMPRESSION:  Nasogastric tube and Keofeed tube in position as described.    Assessment/Plan     1. Severe acute alcoholic pancreatitis with necrosis.  2. Systemic inflammatory response syndrome with fever.  3. Intermittent ileus secondary to abdominal inflammation from pancreatitis, aggravated by opiates.  4. Morbid obesity    Overall slowly improving.  Consider starting liquid diet if he passes his FEES tomorrow  Recommend repeating CT scan in 8 weeks.  Sooner if clinical deterioration  .  Little else for us to offer at this point therefore will sign off.  Please reconsult if needed.        Acute pancreatitis. Alcoholic +/- hyperlipidemia induced    Alcohol abuse    Essential hypertension    GERD without esophagitis    Class 3 severe obesity in adult     LIANA    Alcohol withdrawal     Hypertriglyceridemia    Fatty liver    Acute hypoxemic respiratory failure requiring NIV. ICU transfer 1/22/2021 (CMS/[facility])    Fever and increased procalcitonin. ? source       LOS: 21 days     Vipin Holliday MD  02/09/21  16:42 EST

## 2021-02-09 NOTE — NURSING NOTE
Pts ND bridle became unsecured and ND had dislodged approx 10cm, this was advanced and resecured with new bridle per charge RN. MD at bedside and okay to feed, no KUB ordered.   MD also gave verbal order to d/c NG and this was removed.

## 2021-02-09 NOTE — PLAN OF CARE
Goal Outcome Evaluation:  Plan of Care Reviewed With: patient  Progress: improving  Outcome Summary: Pt demonstrated increased indep with sitting balance (CGA). Progressed to STS x2 from recliner with mod x2 A, being able to perform pre-gait R/L weightshifting for 40s x2 reps. Noted significant improvement in cognition and motivation to participate. Will cont PT POC. IP rehab remains appropriate d/c recommendation.

## 2021-02-10 ENCOUNTER — APPOINTMENT (OUTPATIENT)
Dept: GENERAL RADIOLOGY | Facility: HOSPITAL | Age: 46
End: 2021-02-10

## 2021-02-10 ENCOUNTER — ANCILLARY PROCEDURE (OUTPATIENT)
Dept: SPEECH THERAPY | Facility: HOSPITAL | Age: 46
End: 2021-02-10

## 2021-02-10 LAB
ANION GAP SERPL CALCULATED.3IONS-SCNC: 13 MMOL/L (ref 5–15)
BUN SERPL-MCNC: 15 MG/DL (ref 6–20)
BUN/CREAT SERPL: 23.4 (ref 7–25)
CALCIUM SPEC-SCNC: 9.4 MG/DL (ref 8.6–10.5)
CHLORIDE SERPL-SCNC: 98 MMOL/L (ref 98–107)
CO2 SERPL-SCNC: 23 MMOL/L (ref 22–29)
CREAT SERPL-MCNC: 0.64 MG/DL (ref 0.76–1.27)
GFR SERPL CREATININE-BSD FRML MDRD: >150 ML/MIN/1.73
GLUCOSE SERPL-MCNC: 95 MG/DL (ref 65–99)
MAGNESIUM SERPL-MCNC: 2 MG/DL (ref 1.6–2.6)
PHOSPHATE SERPL-MCNC: 4 MG/DL (ref 2.5–4.5)
POTASSIUM SERPL-SCNC: 4.6 MMOL/L (ref 3.5–5.2)
SODIUM SERPL-SCNC: 134 MMOL/L (ref 136–145)

## 2021-02-10 PROCEDURE — 99231 SBSQ HOSP IP/OBS SF/LOW 25: CPT | Performed by: PSYCHIATRY & NEUROLOGY

## 2021-02-10 PROCEDURE — 74018 RADEX ABDOMEN 1 VIEW: CPT

## 2021-02-10 PROCEDURE — 94799 UNLISTED PULMONARY SVC/PX: CPT

## 2021-02-10 PROCEDURE — 25010000002 ONDANSETRON PER 1 MG: Performed by: FAMILY MEDICINE

## 2021-02-10 PROCEDURE — 25010000002 DIAZEPAM PER 5 MG: Performed by: INTERNAL MEDICINE

## 2021-02-10 PROCEDURE — 25010000002 METHYLNALTREXONE 12 MG/0.6ML SOLUTION: Performed by: INTERNAL MEDICINE

## 2021-02-10 PROCEDURE — 84100 ASSAY OF PHOSPHORUS: CPT | Performed by: INTERNAL MEDICINE

## 2021-02-10 PROCEDURE — 80048 BASIC METABOLIC PNL TOTAL CA: CPT | Performed by: INTERNAL MEDICINE

## 2021-02-10 PROCEDURE — 97530 THERAPEUTIC ACTIVITIES: CPT

## 2021-02-10 PROCEDURE — 25010000002 ENOXAPARIN PER 10 MG: Performed by: FAMILY MEDICINE

## 2021-02-10 PROCEDURE — 97110 THERAPEUTIC EXERCISES: CPT

## 2021-02-10 PROCEDURE — 83735 ASSAY OF MAGNESIUM: CPT | Performed by: INTERNAL MEDICINE

## 2021-02-10 PROCEDURE — 92612 ENDOSCOPY SWALLOW (FEES) VID: CPT | Performed by: SPEECH-LANGUAGE PATHOLOGIST

## 2021-02-10 PROCEDURE — 97116 GAIT TRAINING THERAPY: CPT

## 2021-02-10 PROCEDURE — 99232 SBSQ HOSP IP/OBS MODERATE 35: CPT | Performed by: INTERNAL MEDICINE

## 2021-02-10 PROCEDURE — 94660 CPAP INITIATION&MGMT: CPT

## 2021-02-10 RX ORDER — DIAZEPAM 5 MG/1
10 TABLET ORAL EVERY 8 HOURS
Status: DISCONTINUED | OUTPATIENT
Start: 2021-02-10 | End: 2021-02-11

## 2021-02-10 RX ORDER — DIAZEPAM 5 MG/1
5 TABLET ORAL EVERY 6 HOURS PRN
Status: DISCONTINUED | OUTPATIENT
Start: 2021-02-10 | End: 2021-02-11 | Stop reason: HOSPADM

## 2021-02-10 RX ORDER — POLYETHYLENE GLYCOL 3350 17 G/17G
17 POWDER, FOR SOLUTION ORAL DAILY
Status: DISCONTINUED | OUTPATIENT
Start: 2021-02-10 | End: 2021-02-11 | Stop reason: HOSPADM

## 2021-02-10 RX ORDER — PANTOPRAZOLE SODIUM 40 MG/1
40 TABLET, DELAYED RELEASE ORAL DAILY
Status: DISCONTINUED | OUTPATIENT
Start: 2021-02-11 | End: 2021-02-11 | Stop reason: HOSPADM

## 2021-02-10 RX ADMIN — ENOXAPARIN SODIUM 40 MG: 40 INJECTION SUBCUTANEOUS at 21:04

## 2021-02-10 RX ADMIN — SODIUM CHLORIDE, PRESERVATIVE FREE 10 ML: 5 INJECTION INTRAVENOUS at 21:05

## 2021-02-10 RX ADMIN — THIAMINE HCL TAB 100 MG 250 MG: 100 TAB at 21:03

## 2021-02-10 RX ADMIN — BISACODYL 10 MG: 10 SUPPOSITORY RECTAL at 08:53

## 2021-02-10 RX ADMIN — FOLIC ACID 1 MG: 1 TABLET ORAL at 08:53

## 2021-02-10 RX ADMIN — METOPROLOL TARTRATE 25 MG: 25 TABLET, FILM COATED ORAL at 21:03

## 2021-02-10 RX ADMIN — DIAZEPAM 10 MG: 5 INJECTION, SOLUTION INTRAMUSCULAR; INTRAVENOUS at 05:58

## 2021-02-10 RX ADMIN — OXYCODONE 5 MG: 5 TABLET ORAL at 02:31

## 2021-02-10 RX ADMIN — METOPROLOL TARTRATE 25 MG: 25 TABLET, FILM COATED ORAL at 08:53

## 2021-02-10 RX ADMIN — OXYCODONE 5 MG: 5 TABLET ORAL at 21:13

## 2021-02-10 RX ADMIN — SIMETHICONE 120 MG: 20 EMULSION ORAL at 03:46

## 2021-02-10 RX ADMIN — OXYCODONE 5 MG: 5 TABLET ORAL at 11:17

## 2021-02-10 RX ADMIN — CLONIDINE HYDROCHLORIDE 0.1 MG: 0.1 TABLET ORAL at 21:04

## 2021-02-10 RX ADMIN — ONDANSETRON 4 MG: 2 INJECTION INTRAMUSCULAR; INTRAVENOUS at 05:47

## 2021-02-10 RX ADMIN — IPRATROPIUM BROMIDE AND ALBUTEROL SULFATE 3 ML: 2.5; .5 SOLUTION RESPIRATORY (INHALATION) at 07:00

## 2021-02-10 RX ADMIN — ENOXAPARIN SODIUM 40 MG: 40 INJECTION SUBCUTANEOUS at 08:52

## 2021-02-10 RX ADMIN — POLYETHYLENE GLYCOL 3350 17 G: 17 POWDER, FOR SOLUTION ORAL at 03:07

## 2021-02-10 RX ADMIN — DIAZEPAM 10 MG: 5 INJECTION, SOLUTION INTRAMUSCULAR; INTRAVENOUS at 11:17

## 2021-02-10 RX ADMIN — DOCUSATE SODIUM 100 MG: 50 LIQUID ORAL at 21:04

## 2021-02-10 RX ADMIN — IPRATROPIUM BROMIDE AND ALBUTEROL SULFATE 3 ML: 2.5; .5 SOLUTION RESPIRATORY (INHALATION) at 16:00

## 2021-02-10 RX ADMIN — Medication 1 PATCH: at 13:17

## 2021-02-10 RX ADMIN — Medication 1 PATCH: at 09:16

## 2021-02-10 RX ADMIN — METHYLNALTREXONE BROMIDE 12 MG: 12 INJECTION, SOLUTION SUBCUTANEOUS at 08:52

## 2021-02-10 RX ADMIN — PANTOPRAZOLE SODIUM 40 MG: 40 INJECTION, POWDER, LYOPHILIZED, FOR SOLUTION INTRAVENOUS at 05:58

## 2021-02-10 RX ADMIN — DOCUSATE SODIUM 100 MG: 50 LIQUID ORAL at 08:52

## 2021-02-10 RX ADMIN — ESCITALOPRAM OXALATE 20 MG: 20 TABLET ORAL at 08:53

## 2021-02-10 RX ADMIN — DIAZEPAM 10 MG: 5 INJECTION, SOLUTION INTRAMUSCULAR; INTRAVENOUS at 00:26

## 2021-02-10 RX ADMIN — DIAZEPAM 10 MG: 5 TABLET ORAL at 21:04

## 2021-02-10 RX ADMIN — MINERAL OIL 300 ML: 1000 LIQUID ORAL at 03:12

## 2021-02-10 RX ADMIN — SENNOSIDES 10 ML: 8.8 LIQUID ORAL at 21:05

## 2021-02-10 RX ADMIN — CLONIDINE HYDROCHLORIDE 0.1 MG: 0.1 TABLET ORAL at 08:52

## 2021-02-10 RX ADMIN — THIAMINE HCL TAB 100 MG 250 MG: 100 TAB at 08:53

## 2021-02-10 RX ADMIN — SODIUM CHLORIDE, PRESERVATIVE FREE 10 ML: 5 INJECTION INTRAVENOUS at 08:54

## 2021-02-10 NOTE — PLAN OF CARE
Goal Outcome Evaluation:     Progress: no change  Outcome Summary: VS Stable on 2L NC while awake and Bipap with sleep. Pt alert, oriented, calm, and cooperative throughout the shift with only scheduled valium. Good uop. Pt continues to complain of abdominal pain, fullness, gas pain, and cramping as well as intermittent nausea throughout the shift. Continued to hold tube feeding due to complaints. (FEES test planned for am.) Up to BSC ~6 times. He had a large BM at 2200, but he continued to complain of needing to have a BM throughout the night. He is passing flatus. Pain improves slightly with prn roxicodone. PRN zofran and simethecone given as well. Pt continues to complain of needing to have BM and requested laxative and enema. Miralax and enema given with moderate results on BSC. Addendum: Pt continues to c/o abdominal pain and had a large episode of emesis (clear green) at 0600 despite tube feeding remaining on hold. Zofran given. KUB ordered.

## 2021-02-10 NOTE — PLAN OF CARE
Goal Outcome Evaluation:  Plan of Care Reviewed With: patient  Progress: improving  Outcome Summary: Pt demo significantly improved activity tolerance and independence this date. Pt is Min Ax1+1 to ambulate 150 ft w/ RW and Min Ax2 for t/fs. Pt conts to require significant assist for ADL performance d/t generalized weakness though demo excellent effort. Recommend cont skilled IPOT POC. Recommend pt DC to IP rehab, however will monitor progress closely.

## 2021-02-10 NOTE — PROGRESS NOTES
INTENSIVIST   PROGRESS NOTE     Hospital:  LOS: 22 days     Mr. Titus Bro, 46 y.o. male is followed for a Chief Complaint of: Encephalopathy      Subjective   S     Interval History:  Abdominal pain overnight. Better after a bowel movement. He passed his FEES this AM.        The patient's relevant past medical, surgical and social history were reviewed and updated in Epic as appropriate.      ROS:   Constitutional: Negative for fever.   Respiratory: Negative for dyspnea.   Cardiovascular: Negative for chest pain.   Gastrointestinal: Negative for  nausea, vomiting and diarrhea.         Objective   O     Vitals:  Temp  Min: 97.3 °F (36.3 °C)  Max: 98.5 °F (36.9 °C)  BP  Min: 117/75  Max: 160/89  Pulse  Min: 81  Max: 104  Resp  Min: 18  Max: 24  SpO2  Min: 88 %  Max: 100 % Flow (L/min)  Min: 2  Max: 4    Intake/Ouptut 24 hrs (7:00AM - 6:59 AM)  Intake & Output (last 3 days)       02/07 0701 - 02/08 0700 02/08 0701 - 02/09 0700 02/09 0701 - 02/10 0700 02/10 0701 - 02/11 0700    I.V. (mL/kg)  728.9 (3.5) 342.6 (1.7) 14.7 (0.1)    Other 1138 561 700     NG/GT 2089 2084 1365 60    Total Intake(mL/kg) 3227 (15.7) 3373.9 (16.4) 2407.6 (12) 74.7 (0.4)    Urine (mL/kg/hr) 4925 (1) 3100 (0.6) 1450 (0.3) 250 (0.2)    Emesis/NG output 800 400 0     Stool 0 0 0     Total Output 5725 3500 1450 250    Net -2498 -126.1 +957.6 -175.3            Stool Unmeasured Occurrence 2 x 1 x 4 x     Emesis Unmeasured Occurrence   1 x           Medications (drips):  dexmedetomidine, Last Rate: Stopped (02/09/21 0915)          Physical Examination  Telemetry:  Normal sinus rhythm.    Constitutional:  No acute distress.  Sitting up in a chair.    Eyes: No scleral icterus.   PERRL, EOM intact.    Neck:  Supple, FROM   Cardiovascular: Normal rate, regular and rhythm. Normal heart sounds.  No murmurs, gallop or rub.   Respiratory: No respiratory distress. Normal respiratory effort.  Clear to auscultation bilaterally.    Abdominal:  Soft. No masses.  Nontender. No distension. No HSM.   Extremities: No digital cyanosis. No clubbing.  1+ peripheral edema.   Skin: No rashes, lesions or ulcers   Neurological:   Alert and oriented x 3.                Results from last 7 days   Lab Units 02/09/21  0321 02/08/21  0330 02/07/21  0319   WBC 10*3/mm3 10.49 9.51 9.62   HEMOGLOBIN g/dL 10.8* 10.3* 10.1*   MCV fL 89.1 87.0 86.3   PLATELETS 10*3/mm3 720* 676* 737*     Results from last 7 days   Lab Units 02/10/21  0601 02/09/21  0906 02/08/21  0330   SODIUM mmol/L 134* 138 138   POTASSIUM mmol/L 4.6 4.5 3.9   CO2 mmol/L 23.0 32.0* 30.0*   CREATININE mg/dL 0.64* 0.58* 0.55*   GLUCOSE mg/dL 95 131* 122*   MAGNESIUM mg/dL 2.0 1.9 1.9   PHOSPHORUS mg/dL 4.0 3.5 4.1     Estimated Creatinine Clearance: 265.2 mL/min (A) (by C-G formula based on SCr of 0.64 mg/dL (L)).  Results from last 7 days   Lab Units 02/05/21  0424   ALK PHOS U/L 62   BILIRUBIN mg/dL 0.2   ALT (SGPT) U/L 16   AST (SGOT) U/L 22       Results from last 7 days   Lab Units 02/08/21  0155   PH, ARTERIAL pH units 7.453*   PCO2, ARTERIAL mm Hg 49.4*   PO2 ART mm Hg 62.5*   FIO2 % 28       Images:    Imaging Results (Last 24 Hours)     Procedure Component Value Units Date/Time    SLP FEES - Fiberoptic Endo Eval Swallow [[id number]] Resulted: 02/10/21 1038     Updated: 02/10/21 1038    Narrative:      This procedure was auto-finalized with no dictation required.    XR Abdomen KUB [[id number]] Collected: 02/10/21 0643     Updated: 02/10/21 0646    Narrative:      CR Abdomen 1 Vw    INDICATION:   Abdominal pain and emesis today    COMPARISON:   2/9/2021    FINDINGS:  AP radiographs of the abdomen. The feeding tube tip appears to be in the second portion of duodenum. The bowel gas pattern is nonspecific without evidence of obstruction. Bones are unremarkable        Impression:      Feeding tube is in good position. Bowel gas pattern is nonspecific    Signer Name: Ken Ronquillo MD   Signed: 2/10/2021 6:43 AM   Workstation Name:  [email or web address]    [facility]          Results: Reviewed.  I reviewed the patient's new laboratory and imaging results.  I independently reviewed the patient's new images.    Medications: Reviewed.    Assessment/Plan   A / P     Mr. Bro is a 47yo M with a history of ETOH abuse who was admitted on 1/19/21 with pancreatitis. He was admitted to Hospital Medicine but developed ETOH withdrawal necessitating transfer to the ICU on 1/22/21. He was placed on a Precedex drip and given IV Valium. He continued to require intermittent doses of Ativan despite this. He ultimately required intubation with mechanical ventilation on 1/24/21. A repeat CT of the abdomen/pelvis was performed on 1/27/21 due to worsening fevers and showed some possible necrosis in the head of the pancreas and worsening peripancreatic fluid collections. GI is following. He was extubated on 1/31/21. Precedex was finally stopped on 2/6/21 but had to be restarted on 2/7/21 due to agitation. Neurology was consulted and changed up his sedation regimen. He is now off Precedex and more alert. He passed his FEES on 2/10.     Nutrition:   Diet Regular; Low Fat  Advance Directives:   Code Status and Medical Interventions:   Ordered at: 01/19/21 1408     Level Of Support Discussed With:    Patient     Code Status:    CPR     Medical Interventions (Level of Support Prior to Arrest):    Full       Active Hospital Problems    Diagnosis   • **Acute pancreatitis. Alcoholic +/- hyperlipidemia induced   • Alcohol withdrawal    • Hypertriglyceridemia   • Fatty liver   • Acute hypoxemic respiratory failure requiring NIV. ICU transfer 1/22/2021 (CMS/[facility])   • Fever and increased procalcitonin. ? source   • Class 3 severe obesity in adult    • LIANA   • Alcohol abuse   • Essential hypertension   • GERD without esophagitis       Assessment / Plan:    Continue to monitor in the ICU.   Continue current sedation regimen per Neurology. Appreciate their  assistance.      Stop tube feeds. D/c keofeed  Start a low-fat diet.      Continue thiamine  Bowel regimen per GI   PT/OT  AM labs      High level of risk due to:  severe exacerbation of chronic illness and illness with threat to life or bodily function.    Plan of care and goals reviewed during interdisciplinary rounds.  I discussed the patient's findings and my recommendations with patient and nursing staff      Jeanie Page, DO    Intensive Care Medicine and Pulmonary Medicine

## 2021-02-10 NOTE — PROGRESS NOTES
Continued Stay Note  [facility]     Patient Name: Titus Bro  MRN: [id number]  Today's Date: 2/10/2021    Admit Date: 1/19/2021    Discharge Plan     Row Name 02/10/21 1323       Plan    Plan  Home with HH vs rehab    Patient/Family in Agreement with Plan  yes    Plan Comments  Spoke with patient at bedside.  Patient has made improvement with PT but would still benefit from inpatient rehab.  Discussed this with patient.  Patient is adamant he is going home but he would be in agreement to outpatient PT or home health.  Patient finally agreed to think about rehab, but doubt he will ultimately agree to it.  CM will continue to follow.        Discharge Codes    No documentation.       Expected Discharge Date and Time     Expected Discharge Date Expected Discharge Time    Feb 12, 2021             Krystal Salmeron RN

## 2021-02-10 NOTE — PLAN OF CARE
Goal Outcome Evaluation:  Plan of Care Reviewed With: patient  Progress: improving  Outcome Summary: Pt demonstrated increased indep with STS transfer (min A) and progressed forward ambulation distance to 240 total ft with RW, min A (+1 for equip). Cont to be limited by decreased safety awareness, but very motivated to participate and progress. Cont to recommend IP rehab to maximize functional outcomes, but if pt declines rehab placement, he will need 24/7 initial supervision and HHPT services.

## 2021-02-10 NOTE — DISCHARGE SUMMARY
ELOPEMENT AGAINST MEDICAL ADVICE     Admit date: 1/19/2021  Date of Discharge:  2/10/2021    Discharge Diagnoses  Active Hospital Problems    Diagnosis   • **Acute pancreatitis. Alcoholic +/- hyperlipidemia induced   • Alcohol withdrawal    • Hypertriglyceridemia   • Fatty liver   • Acute hypoxemic respiratory failure requiring NIV. ICU transfer 1/22/2021 (CMS/[facility])   • Fever and increased procalcitonin. ? source   • Class 3 severe obesity in adult    • LIANA   • Alcohol abuse   • Essential hypertension   • GERD without esophagitis       Past Surgical History:   Procedure Laterality Date   • KNEE ARTHROSCOPY Bilateral    • WRIST ARTHROPLASTY Left        History of Present Illness    Patient is a 46 y.o. male presented with: Acute pancreatitis    Titus Bro, a 46 y.o. 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 Pain was similar to when he had pancreatitis in the past. He denied diarrhea, fever or chills. He received IV pain medication, anti-emetics, and IVF in the ED. He was admitted to the Hospital Medicine service.    Hospital Course    Upon admission he was continued on IV hydration, anti-emetics, and pain medications. He was started on CIWA protocol with scheduled/PRN benzodiazepines and vitamins. He developed florid alcohol withdrawal and was transferred to ICU on 1/22 for agitation and aggression, as well as respiratory failure requiring BiPAP. He required intubation on 1/24 secondary to hypoxia and was extubated on 1/31. He remained febrile despite meropenem and vancomycin and repeat CT abdomen/pelvis on 1/27 revealed possible necrosis in the head of the pancreas with worsening peripancreatic fluid collections. Micafungin was added on 1/28. Post-pyloric feeding tube was placed and he was started on enteral nutrition. He continued to be encephalopathic with  agitation and aggression and was started on Seroquel in addition to scheduled valium and Precedex. 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 Seroquel was discontinued and Valium was continued. He was improving and passed FEES and was able to have feeding tube discontinued. He was ambulating with PT up to 240 feet and was recommended for IP rehab but he refused. On 2/10/2021 he was adamant that he was going home. Despite the fact that Dr. Page reiterated the need for him to stay but he refused and demanded to LEAVE AGAINST MEDICAL ADVICE.     Procedures Performed: None    Consults:   Brunner, Mark I, MD, Gastroenterology    Ángel Rios MD, Neurology    Pertinent Test Results:   Results from last 7 days   Lab Units 02/09/21  0321   WBC 10*3/mm3 10.49   HEMOGLOBIN g/dL 10.8*   HEMATOCRIT % 36.8*   PLATELETS 10*3/mm3 720*     Results from last 7 days   Lab Units 02/10/21  0601 02/09/21  0906 02/08/21  0330   SODIUM mmol/L 134* 138 138   POTASSIUM mmol/L 4.6 4.5 3.9   CHLORIDE mmol/L 98 99 99   CO2 mmol/L 23.0 32.0* 30.0*   BUN mg/dL 15 17 14   CREATININE mg/dL 0.64* 0.58* 0.55*   GLUCOSE mg/dL 95 131* 122*   CALCIUM mg/dL 9.4 9.6 9.1   PHOSPHORUS mg/dL 4.0 3.5 4.1           Condition on Discharge:  **Patient left AMA prior to completion of evaluation and management**      Discharge Disposition:  **Patient left AMA prior to completion of evaluation and management**    Discharge Medications:      Discharge Medications      ASK your doctor about these medications      Instructions Start Date   amantadine 100 MG capsule  Commonly known as: SYMMETREL   100 mg, Oral, 2 Times Daily      celecoxib 400 MG capsule  Commonly known as: CeleBREX   400 mg, Oral, 2 Times Daily      cloNIDine 0.1 MG tablet  Commonly known as: CATAPRES   0.1 mg, Oral, 2 Times Daily      dicyclomine 20 MG tablet  Commonly known as: BENTYL   20 mg, Oral, Every 8 Hours PRN       escitalopram 20 MG tablet  Commonly known as: LEXAPRO   20 mg, Oral, Daily      folic acid 1 MG tablet  Commonly known as: FOLVITE   1 mg, Oral, Daily      hydroCHLOROthiazide 25 MG tablet  Commonly known as: HYDRODIURIL   25 mg, Oral, Daily      hydrOXYzine pamoate 50 MG capsule  Commonly known as: VISTARIL   50 mg, Oral, As Needed      meloxicam 7.5 MG tablet  Commonly known as: MOBIC   7.5 mg, Oral, 2 Times Daily      methocarbamol 750 MG tablet  Commonly known as: ROBAXIN   750 mg, Oral, 2 Times Daily      metoprolol tartrate 25 MG tablet  Commonly known as: LOPRESSOR   25 mg, Oral, 2 Times Daily      omeprazole 40 MG capsule  Commonly known as: priLOSEC   40 mg, Oral, Daily      ondansetron 4 MG tablet  Commonly known as: ZOFRAN   4 mg, Oral, Every 8 Hours PRN      vitamin B-12 1000 MCG tablet  Commonly known as: CYANOCOBALAMIN   1,000 mcg, Oral, Daily           **Patient left AMA prior to completion of evaluation and management, therefore discharge planning remains incomplete including absence of any needed discharging medications, testing arrangements or follow up unless otherwise specified**to completion of evaluation and management**    Discharge Diet: Diet Regular; Low Fat    Activity at Discharge: **Patient left AMA prior to completion of evaluation and management**    Follow-up Appointments  No future appointments.      Test Results Pending at Discharge       Code Status and Medical Interventions:   Ordered at: 01/19/21 1408     Level Of Support Discussed With:    Patient     Code Status:    CPR     Medical Interventions (Level of Support Prior to Arrest):    Full       Ade Gregg, EMIGDIO  02/10/21  16:00 EST    Discharge Time Spent: 20 Minutes

## 2021-02-10 NOTE — PLAN OF CARE
Goal Outcome Evaluation:  Plan of Care Reviewed With: patient      SLP evaluation completed. Will sign-off for dysphagia. FEES completed, initiated on regular diet w/ thin liquids. Please see note for further details and recommendations.

## 2021-02-10 NOTE — THERAPY TREATMENT NOTE
Patient Name: Titus Bro  : 1975    MRN: [id number]                              Today's Date: 2/10/2021       Admit Date: 2021    Visit Dx:     ICD-10-CM ICD-9-CM   1. Acute pancreatitis, unspecified complication status, unspecified pancreatitis type  K85.90 577.0     Patient Active Problem List   Diagnosis   • Acute pancreatitis. Alcoholic +/- hyperlipidemia induced   • Alcohol abuse   • Lactic acidosis   • Essential hypertension   • GERD without esophagitis   • Class 3 severe obesity in adult    • LIANA   • Alcohol withdrawal    • Hypertriglyceridemia   • Fatty liver   • Acute hypoxemic respiratory failure requiring NIV. ICU transfer 2021 (CMS/[facility])   • Fever and increased procalcitonin. ? source     Past Medical History:   Diagnosis Date   • Anxiety    • Arthritis    • Asthma    • COPD (chronic obstructive pulmonary disease) (CMS/[facility])    • GERD (gastroesophageal reflux disease)    • H/O chest tube placement    • Hypertension    • Seizures (CMS/[facility])     WITHDRAWAL     Past Surgical History:   Procedure Laterality Date   • KNEE ARTHROSCOPY Bilateral    • WRIST ARTHROPLASTY Left      General Information     Row Name 02/10/21 1613          Physical Therapy Time and Intention    Document Type  therapy note (daily note)  -     Mode of Treatment  physical therapy  -     Row Name 02/10/21 1613          General Information    Existing Precautions/Restrictions  fall;oxygen therapy device and L/min  -     Row Name 02/10/21 1613          Cognition    Orientation Status (Cognition)  oriented x 4  -     Row Name 02/10/21 1613          Safety Issues, Functional Mobility    Cognitive Impairments, Mobility Safety/Performance  insight into deficits/self-awareness;impulsivity;safety precaution awareness  -       User Key  (r) = Recorded By, (t) = Taken By, (c) = Cosigned By    Initials Name Provider Type    LS Martine Arciniega, PT Physical Therapist        Mobility     Row Name 02/10/21 1614          Bed  Mobility    Comment (Bed Mobility)  [facility]  -LS     Row Name 02/10/21 1614          Sit-Stand Transfer    Sit-Stand [place] (Transfers)  minimum assist (75% patient effort);verbal cues  -LS     Assistive Device (Sit-Stand Transfers)  walker, front-wheeled  -LS     Row Name 02/10/21 1614          Gait/Stairs (Locomotion)    [place] Level (Gait)  minimum assist (75% patient effort);verbal cues;1 person assist;1 person to manage equipment  -LS     Assistive Device (Gait)  walker, front-wheeled  -LS     Distance in Feet (Gait)  240  -LS     Deviations/Abnormal Patterns (Gait)  bilateral deviations;zacarias decreased;weight shifting decreased  -LS     Bilateral Gait Deviations  forward flexed posture;heel strike decreased  -LS     Comment (Gait/Stairs)  2 brief standing rest breaks due to fatigue. Cues for upright posture and increasing bilat heel strike. Noted slight decreased safety awareness, but very motivated to progress distance.  -LS       User Key  (r) = Recorded By, (t) = Taken By, (c) = Cosigned By    Initials Name Provider Type    LS Martine Arciniega, PT Physical Therapist        Obj/Interventions     Row Name 02/10/21 1616          Motor Skills    Therapeutic Exercise  shoulder;hip  -     Row Name 02/10/21 1616          Shoulder (Therapeutic Exercise)    Shoulder (Therapeutic Exercise)  AROM (active range of motion)  -     Shoulder AROM (Therapeutic Exercise)  bilateral;flexion;extension;aBduction;aDduction;10 repetitions  -     Row Name 02/10/21 1616          Hip (Therapeutic Exercise)    Hip (Therapeutic Exercise)  AROM (active range of motion)  -     Hip AROM (Therapeutic Exercise)  bilateral;external rotation;internal rotation;10 repetitions;sitting  -     Row Name 02/10/21 1616          Knee (Therapeutic Exercise)    Knee Strengthening (Therapeutic Exercise)  marching while seated;LAQ (long arc quad);10 repetitions;sitting  -     Row Name 02/10/21 1616          Ankle (Therapeutic  Exercise)    Ankle AROM (Therapeutic Exercise)  bilateral;dorsiflexion;plantarflexion;10 repetitions;sitting  -LS     Row Name 02/10/21 1616          Balance    Static Sitting Balance  WFL;sitting in chair  -LS     Static Standing Balance  mild impairment;supported;standing  -LS       User Key  (r) = Recorded By, (t) = Taken By, (c) = Cosigned By    Initials Name Provider Type    Martine Perrin, PT Physical Therapist        Goals/Plan    No documentation.       Clinical Impression     Row Name 02/10/21 1617          Pain Scale: Numbers Pre/Post-Treatment    Pretreatment Pain Rating  0/10 - no pain  -LS     Posttreatment Pain Rating  0/10 - no pain  -LS     Row Name 02/10/21 1617          Plan of Care Review    Plan of Care Reviewed With  patient  -LS     Progress  improving  -LS     Outcome Summary  Pt demonstrated increased indep with STS transfer (min A) and progressed forward ambulation distance to 240 total ft with RW, min A (+1 for equip). Cont to be limited by decreased safety awareness, but very motivated to participate and progress. Cont to recommend IP rehab to maximize functional outcomes, but if pt declines rehab placement, he will need 24/7 initial supervision and HHPT services.  -     Row Name 02/10/21 1617          Vital Signs    Pre Systolic BP Rehab  127  -LS     Pre Treatment Diastolic BP  87  -LS     Pretreatment Heart Rate (beats/min)  91  -LS     Posttreatment Heart Rate (beats/min)  89  -LS     Pre SpO2 (%)  97  -LS     O2 Delivery Pre Treatment  nasal cannula  -LS     O2 Delivery Intra Treatment  nasal cannula  -LS     Post SpO2 (%)  96  -LS     O2 Delivery Post Treatment  nasal cannula  -LS     Pre Patient Position  Sitting  -LS     Intra Patient Position  Standing  -LS     Post Patient Position  Sitting  -LS     Row Name 02/10/21 1617          Positioning and Restraints    Pre-Treatment Position  sitting in chair/recliner  -LS     Post Treatment Position  chair  -LS     In Chair   notified nsg;reclined;call light within reach;encouraged to call for assist;exit alarm on;legs elevated;on mechanical lift sling;patient within staff view  -LS       User Key  (r) = Recorded By, (t) = Taken By, (c) = Cosigned By    Initials Name Provider Type    Martine Perrin, PT Physical Therapist        Outcome Measures     Row Name 02/10/21 1619          How much help from another person do you currently need...    Turning from your back to your side while in flat bed without using bedrails?  3  -LS     Moving from lying on back to sitting on the side of a flat bed without bedrails?  3  -LS     Moving to and from a bed to a chair (including a wheelchair)?  3  -LS     Standing up from a chair using your arms (e.g., wheelchair, bedside chair)?  3  -LS     Climbing 3-5 steps with a railing?  2  -LS     To walk in hospital room?  3  -LS     AM-PAC 6 Clicks Score (PT)  17  -LS       User Key  (r) = Recorded By, (t) = Taken By, (c) = Cosigned By    Initials Name Provider Type    Martine Perrin, PT Physical Therapist        Physical Therapy Education                 Title: PT OT SLP Therapies (In Progress)     Topic: Physical Therapy (Done)     Point: Mobility training (Done)     Learning Progress Summary           Patient Acceptance, E,D, VU,NR by LS at 2/10/2021 1620    Acceptance, E,D, VU,NR by LS at 2/9/2021 1419    Acceptance, E,D, NR by LS at 2/5/2021 1021                   Point: Home exercise program (Done)     Learning Progress Summary           Patient Acceptance, E,D, VU,NR by LS at 2/10/2021 1620    Acceptance, E,D, VU,NR by LS at 2/9/2021 1419                   Point: Body mechanics (Done)     Learning Progress Summary           Patient Acceptance, E,D, VU,NR by LS at 2/10/2021 1620    Acceptance, E,D, VU,NR by LS at 2/9/2021 1419    Acceptance, E,D, NR by LS at 2/5/2021 1021                   Point: Precautions (Done)     Learning Progress Summary           Patient Acceptance, E,D, VU,NR by LS at  2/10/2021 1620    Acceptance, E,D, VU,NR by  at 2/9/2021 1419    Acceptance, E,D, NR by  at 2/5/2021 1021                               User Key     Initials Effective Dates Name Provider Type Discipline     06/19/15 -  Martine Arciniega, PT Physical Therapist PT              PT Recommendation and Plan  Planned Therapy Interventions (PT): balance training, bed mobility training, gait training, home exercise program, patient/family education, strengthening, transfer training  Plan of Care Reviewed With: patient  Progress: improving  Outcome Summary: Pt demonstrated increased indep with STS transfer (min A) and progressed forward ambulation distance to 240 total ft with RW, min A (+1 for equip). Cont to be limited by decreased safety awareness, but very motivated to participate and progress. Cont to recommend IP rehab to maximize functional outcomes, but if pt declines rehab placement, he will need 24/7 initial supervision and HHPT services.     Time Calculation:   PT Charges     Row Name 02/10/21 1620             Time Calculation    Start Time  1432  -      PT Received On  02/10/21  -         Time Calculation- PT    Total Timed Code Minutes- PT  23 minute(s)  -         Timed Charges    21041 - PT Therapeutic Exercise Minutes  8  -      49846 - Gait Training Minutes   15  -        User Key  (r) = Recorded By, (t) = Taken By, (c) = Cosigned By    Initials Name Provider Type     Martine Arciniega, PT Physical Therapist        Therapy Charges for Today     Code Description Service Date Service Provider Modifiers Qty    64946798075 HC PT THER PROC EA 15 MIN 2/9/2021 Martine Arciniega, PT GP 1    47256700746 HC PT THERAPEUTIC ACT EA 15 MIN 2/9/2021 Martine Arciniega, PT GP 1    88590188768 HC GAIT TRAINING EA 15 MIN 2/10/2021 Martine Arciniega, PT GP 1    02550213229 HC PT THER PROC EA 15 MIN 2/10/2021 Martine Arciniega, PT GP 1    13903771959 HC PT THER SUPP EA 15 MIN 2/10/2021 Martine Arciniega, PT GP 2          PT  G-Codes  Outcome Measure Options: AM-PAC 6 Clicks Daily Activity (OT)  AM-PAC 6 Clicks Score (PT): 17  AM-PAC 6 Clicks Score (OT): 12    Martine Arciniega, PT  2/10/2021

## 2021-02-10 NOTE — PLAN OF CARE
Goal Outcome Evaluation:  Plan of Care Reviewed With: patient, daughter, other (see comments)  Progress: improving  Outcome Summary: pt A/O x4, no c/o abdominal pain or nausea, passed FEES, ND dced, diet ordered and tolerating, ambulated with PT x 2, 150 and 250 feet. This afternoon patient was adamant about going home, adviced patient he is not ready to go home yet, verbalised all the risk of going home, Dr. Page at bedside, reiterated all the risk of going home against medical advice, pt states he can have his family member to come home and signed AMA paper. When the family arrived, they refused to take him home. Daughter and ex gf at bedside to convince patient to stay. pt adamant about going home. Finally offered family member to stay with him t/o the night. Pt might potentially get discharged tomorrow per MD. pt finally agreed to stay. All oral meds frequency changed and switched to PO per neurology. Pt states he wants to go home and smoke cigerrette and drink. Family at bedside.

## 2021-02-10 NOTE — MBS/VFSS/FEES
Acute Care - Speech Language Pathology   Swallow Initial Evaluation  [place]   Fiberoptic Endoscopic Evaluation of Swallowing (FEES)       Patient Name: Titus Bro  : 1975  MRN: [id number]  Today's Date: 2/10/2021        Referring Physician: DO Camilo      Admit Date: 2021    Visit Dx:     ICD-10-CM ICD-9-CM   1. Acute pancreatitis, unspecified complication status, unspecified pancreatitis type  K85.90 577.0     Patient Active Problem List   Diagnosis   • Acute pancreatitis. Alcoholic +/- hyperlipidemia induced   • Alcohol abuse   • Lactic acidosis   • Essential hypertension   • GERD without esophagitis   • Class 3 severe obesity in adult    • LIANA   • Alcohol withdrawal    • Hypertriglyceridemia   • Fatty liver   • Acute hypoxemic respiratory failure requiring NIV. ICU transfer 2021 (CMS/[facility])   • Fever and increased procalcitonin. ? source     Past Medical History:   Diagnosis Date   • Anxiety    • Arthritis    • Asthma    • COPD (chronic obstructive pulmonary disease) (CMS/[facility])    • GERD (gastroesophageal reflux disease)    • H/O chest tube placement    • Hypertension    • Seizures (CMS/[facility])     WITHDRAWAL     Past Surgical History:   Procedure Laterality Date   • KNEE ARTHROSCOPY Bilateral    • WRIST ARTHROPLASTY Left         SWALLOW EVALUATION (last 72 hours)      [facility] Adult Swallow Evaluation     Row Name 02/10/21 0910 21 1315       Rehab Evaluation    Document Type  evaluation  -CJ  evaluation  -MP    Subjective Information  no complaints  -CJ  no complaints  -MP    Patient Observations  alert;cooperative  -CJ  alert;cooperative  -MP    Patient/Family/Caregiver Comments/Observations  no family present  -CJ  Dtr present  -MP    Care Plan Review  evaluation/treatment results reviewed;patient/other agree to care plan  -CJ  evaluation/treatment results reviewed;patient/other agree to care plan  -MP    Care Plan Review, Other Participant(s)  --  daughter  -MP    Patient Effort  good  -CJ   "good  -MP       General Information    Patient Profile Reviewed  yes  -CJ  yes  -MP    Pertinent History Of Current Problem  see initial eval; pt improved and eager for FEES  -CJ  Adm w/ acute pancreatitis, alcoholic +/- hyperlipidemia induced. LIANA. Respiratory failure, intubated 1/24-1/31. Alcohol w/d. Hx COPD & GERD.  -MP    Current Method of Nutrition  NPO;nasogastric feedings  -CJ  NPO;nasogastric feedings  -MP    Precautions/Limitations, Vision  WFL;for purposes of eval  -CJ  WFL;for purposes of eval  -MP    Precautions/Limitations, Hearing  WFL;for purposes of eval  -CJ  WFL;for purposes of eval  -MP    Prior Level of Function-Communication  WFL  -CJ  WFL  -MP    Prior Level of Function-Swallowing  no diet consistency restrictions  -  no diet consistency restrictions  -MP    Plans/Goals Discussed with  patient;agreed upon  -CJ  patient;family;agreed upon  -MP    Barriers to Rehab  medically complex  -CJ  medically complex  -MP    Patient's Goals for Discharge  return to PO diet  -  -- \"sprite\"  -    Family Goals for Discharge  --  patient able to return to PO diet  -MP       Pain    Additional Documentation  Pain Scale: FACES Pre/Post-Treatment (Group)  -CJ  Pain Scale: FACES Pre/Post-Treatment (Group)  -MP       Pain Scale: FACES Pre/Post-Treatment    Pain: FACES Scale, Pretreatment  0-->no hurt  -CJ  2-->hurts little bit  -MP    Posttreatment Pain Rating  0-->no hurt  -CJ  2-->hurts little bit  -MP       Oral Motor Structure and Function    Dentition Assessment  --  natural, present and adequate  -MP    Secretion Management  --  WNL/WFL  -MP    Mucosal Quality  --  moist, healthy  -MP       Oral Musculature and Cranial Nerve Assessment    Oral Motor General Assessment  --  generalized oral motor weakness  -       General Eating/Swallowing Observations    Respiratory Support Currently in Use  --  nasal cannula  -MP    Eating/Swallowing Skills  --  fed by SLP  -MP    Positioning During Eating  --  " upright in chair  -MP    Utensils Used  --  spoon;cup;straw  -MP    Consistencies Trialed  --  thin liquids;pureed  -MP       Clinical Swallow Eval    Pharyngeal Phase  --  suspected pharyngeal impairment  -MP    Clinical Swallow Evaluation Summary  --  Wet vocal quality w/ consecutive straw sips thin & incr'd RR w/ PO. Plan for FEES tomorrow 2/10 to further assess. OK for ice chips until FEES.  -MP       Pharyngeal Phase Concerns    Pharyngeal Phase Concerns  --  wet vocal quality  -MP    Wet Vocal Quality  --  thin  -MP    Pharyngeal Phase Concerns, Comment  --  incr'd RR  -MP       Fiberoptic Endoscopic Evaluation of Swallowing (FEES)    Risks/Benefits Reviewed  risks/benefits explained;patient;agreed to eval  -CJ  --    Nasal Entry  right:  -CJ  --    Scope serial number/identification  [id number]  -CJ  --       Anatomy and Physiology    Anatomic Considerations  no anatomic structural deviation;vocal folds;other (see comments) mucosal abnormality on BL post 2/3 true VF   -CJ  --    Velopharyngeal  WFL  -CJ  --    Base of Tongue  symmetrical  -CJ  --    Epiglottis  other (see comments) noted mucosal abnormality (white tinged)  -CJ  --    Laryngeal Function Breathing  symmetrical  -CJ  --    Laryngeal Function Phonation  symmetrical  -CJ  --    Laryngeal Function to Breath Hold  TVF contact;sustained closure  -CJ  --    Secretion Rating Scale (Gregg et al. 1996)  1- secretions present around the laryngeal vestibule  -CJ  --    Secretion Description  thin;clear  -CJ  --    Ice Chips  DNA  -CJ  --    Spontaneous Swallow  frequency adequate  -CJ  --    Sensory  sensed scope  -CJ  --    Utensils Used  Spoon;Cup;Straw  -CJ  --    Consistencies Trialed  thin liquids;nectar-thick liquids;honey-thick liquids;pudding/puree;regular textures  -CJ  --       FEES Interpretation    Oral Phase  WFL  -CJ  --       Initiation of Pharyngeal Swallow    Initiation of Pharyngeal Swallow  bolus in valleculae  -CJ  --    Pharyngeal Phase   functional pharyngeal phase of swallow  -  --    FEES Summary  FEES completed this am. Mr. Bro is positioned upright and centered in bedside chair to accept po presentations of puree, solid cracker, nectar thick, thin liquids via spoon, cup and straw. Oral phase is wfl. No significan residue. AP transit is timely w/o premature spillage. No laryngeal penetration or aspiration observed across this evaluation w/ any test consistencies. No significant residue w/ any test consistencies that would impact the safety of swallowing fnx. Pt is noted w/ bilateral mucosal abnormalities on the posterior 2/3 of true VF's, felt to be 2/2 intubation effects. Per this evaluation Mr. Bro presents w/ wfl oropharyngeal swallowing fnx. He is felt to most benefit from initiation of regular diet consistency w/ thin liquids. Meds whole in puree/thins. Upright and centered for all po intake. No further SLP f/u warranted at this time.   -CJ  --       Clinical Impression    SLP Swallowing Diagnosis  functional oral phase;functional pharyngeal phase  -  suspected pharyngeal dysphagia  -    Functional Impact  no impact on function  -  risk of aspiration/pneumonia  -    Rehab Potential/Prognosis, Swallowing  good, to achieve stated therapy goals  -  good, to achieve stated therapy goals  -    Swallow Criteria for Skilled Therapeutic Interventions Met  no problems identified which require skilled intervention;baseline status  -  demonstrates skilled criteria  -       Recommendations    Therapy Frequency (Swallow)  evaluation only  -  --    Predicted Duration Therapy Intervention (Days)  --  until discharge  -    SLP Diet Recommendation  regular textures;thin liquids;other (see comments) solids per GI/MD  -  NPO;temporary alternate methods of nutrition/hydration;other (see comments) OK for few ice chips/hr as tolerated  -    Recommended Diagnostics  No further SLP services recommended  -  FEES;other (see comments)  2/10  -    Recommended Precautions and Strategies  upright posture during/after eating;small bites of food and sips of liquid;general aspiration precautions  -  --    Oral Care Recommendations  Oral Care BID/PRN  -  Oral Care BID/PRN  -MP    SLP Rec. for Method of Medication Administration  meds whole;with thin liquids;with pudding or applesauce;as tolerated  -  meds via alternate route  -MP    Monitor for Signs of Aspiration  yes;notify SLP if any concerns  -  --    Anticipated Discharge Disposition (SLP)  unknown;anticipate therapy at next level of care  -  unknown;anticipate therapy at next level of care  -      User Key  (r) = Recorded By, (t) = Taken By, (c) = Cosigned By    Initials Name Effective Dates    Helen Forde, MS CCC-SLP 02/11/20 -     Santos Pendleton MS CCC-SLP 06/19/19 -           EDUCATION  The patient has been educated in the following areas:   Dysphagia (Swallowing Impairment) Oral Care/Hydration.    SLP Recommendation and Plan  SLP Swallowing Diagnosis: functional oral phase, functional pharyngeal phase  SLP Diet Recommendation: regular textures, thin liquids, other (see comments)(solids per GI/MD)  Recommended Precautions and Strategies: upright posture during/after eating, small bites of food and sips of liquid, general aspiration precautions  SLP Rec. for Method of Medication Administration: meds whole, with thin liquids, with pudding or applesauce, as tolerated     Monitor for Signs of Aspiration: yes, notify SLP if any concerns  Recommended Diagnostics: No further SLP services recommended  Swallow Criteria for Skilled Therapeutic Interventions Met: no problems identified which require skilled intervention, baseline status  Anticipated Discharge Disposition (SLP): unknown, anticipate therapy at next level of care  Rehab Potential/Prognosis, Swallowing: good, to achieve stated therapy goals  Therapy Frequency (Swallow): evaluation only     Plan of Care Reviewed  With: patient           Time Calculation:   Time Calculation- SLP     Row Name 02/10/21 1205             Time Calculation- SLP    SLP Start Time  0910  -      SLP Received On  02/10/21  -        User Key  (r) = Recorded By, (t) = Taken By, (c) = Cosigned By    Initials Name Provider Type    Helen Forde MS CCC-SAAD Speech and Language Pathologist          Therapy Charges for Today     Code Description Service Date Service Provider Modifiers Qty    45844858844 HC ST FIBEROPTIC ENDO EVAL SWALL 7 2/10/2021 Helen Morales MS CCC-SLP GN 1        Patient was not wearing a face mask and did exhibit coughing during this therapy encounter.  Procedure performed was aerosolizing, involved close contact (within 6 feet for at least 15 minutes or longer), and involved contact with infectious secretions or specimens.  Therapist used appropriate personal protective equipment including gloves, standard procedure mask, eye protection, gown and N95 mask.  Appropriate PPE was worn during the entire therapy session.  Hand hygiene was completed before and after therapy session.          MS SUHA Felix  2/10/2021

## 2021-02-10 NOTE — THERAPY TREATMENT NOTE
Patient Name: Titus Bro  : 1975    MRN: [id number]                              Today's Date: 2/10/2021       Admit Date: 2021    Visit Dx:     ICD-10-CM ICD-9-CM   1. Acute pancreatitis, unspecified complication status, unspecified pancreatitis type  K85.90 577.0     Patient Active Problem List   Diagnosis   • Acute pancreatitis. Alcoholic +/- hyperlipidemia induced   • Alcohol abuse   • Lactic acidosis   • Essential hypertension   • GERD without esophagitis   • Class 3 severe obesity in adult    • LIANA   • Alcohol withdrawal    • Hypertriglyceridemia   • Fatty liver   • Acute hypoxemic respiratory failure requiring NIV. ICU transfer 2021 (CMS/[facility])   • Fever and increased procalcitonin. ? source     Past Medical History:   Diagnosis Date   • Anxiety    • Arthritis    • Asthma    • COPD (chronic obstructive pulmonary disease) (CMS/[facility])    • GERD (gastroesophageal reflux disease)    • H/O chest tube placement    • Hypertension    • Seizures (CMS/[facility])     WITHDRAWAL     Past Surgical History:   Procedure Laterality Date   • KNEE ARTHROSCOPY Bilateral    • WRIST ARTHROPLASTY Left      General Information     Row Name 02/10/21 0918          OT Time and Intention    Document Type  therapy note (daily note)  -CL     Mode of Treatment  occupational therapy  -CL     Row Name 02/10/21 0918          General Information    Existing Precautions/Restrictions  fall;oxygen therapy device and L/min;other (see comments) NG  -CL     Barriers to Rehab  medically complex  -CL     Row Name 02/10/21 0918          Cognition    Orientation Status (Cognition)  oriented x 4  -CL     Row Name 02/10/21 0918          Safety Issues, Functional Mobility    Impairments Affecting Function (Mobility)  balance;endurance/activity tolerance;strength  -CL       User Key  (r) = Recorded By, (t) = Taken By, (c) = Cosigned By    Initials Name Provider Type    CL Susannah Ronquillo OT Occupational Therapist          Mobility/ADL's     Row Name  02/10/21 0918          Bed Mobility    Bed Mobility  supine-sit  -CL     Supine-Sit [place] (Bed Mobility)  minimum assist (75% patient effort)  -CL     Assistive Device (Bed Mobility)  bed rails;draw sheet;head of bed elevated  -CL     Row Name 02/10/21 0918          Transfers    Transfers  bed-chair transfer;sit-stand transfer  -CL     Bed-Chair [place] (Transfers)  minimum assist (75% patient effort);2 person assist  -CL     Sit-Stand [place] (Transfers)  minimum assist (75% patient effort);2 person assist  -CL     Row Name 02/10/21 0918          Sit-Stand Transfer    Assistive Device (Sit-Stand Transfers)  walker, front-wheeled  -CL     Row Name 02/10/21 0918          Functional Mobility    Functional Mobility- Ind. Level  minimum assist (75% patient effort);2 person assist required  -CL     Functional Mobility- Device  rolling walker  -CL     Functional Mobility-Distance (Feet)  150  -CL     Row Name 02/10/21 0918          Activities of Daily Living    BADL Assessment/Intervention  lower body dressing  -CL     Row Name 02/10/21 0918          Lower Body Dressing Assessment/Training    [place] Level (Lower Body Dressing)  don;socks;dependent (less than 25% patient effort)  -CL     Position (Lower Body Dressing)  supine  -CL       User Key  (r) = Recorded By, (t) = Taken By, (c) = Cosigned By    Initials Name Provider Type    CL Susannah Ronquillo OT Occupational Therapist        Obj/Interventions     Row Name 02/10/21 0919          Balance    Balance Assessment  sitting static balance;sitting dynamic balance;standing static balance;standing dynamic balance  -CL     Static Sitting Balance  WFL;sitting, edge of bed  -CL     Dynamic Sitting Balance  WFL;sitting, edge of bed  -CL     Static Standing Balance  mild impairment;supported  -CL     Dynamic Standing Balance  mild impairment;supported  -CL       User Key  (r) = Recorded By, (t) = Taken By, (c) = Cosigned By    Initials Name Provider Type    CL  Susannah Ronquillo, SONAL Occupational Therapist        Goals/Plan    No documentation.       Clinical Impression     Row Name 02/10/21 0920          Pain Scale: Numbers Pre/Post-Treatment    Pretreatment Pain Rating  0/10 - no pain  -CL     Posttreatment Pain Rating  0/10 - no pain  -CL     Row Name 02/10/21 0920          Plan of Care Review    Plan of Care Reviewed With  patient  -CL     Progress  improving  -CL     Outcome Summary  Pt demo significantly improved activity tolerance and independence this date. Pt is Min Ax1+1 to ambulate 150 ft w/ RW and Min Ax2 for t/fs. Pt conts to require significant assist for ADL performance d/t generalized weakness though demo excellent effort. Recommend cont skilled IPOT POC. Recommend pt DC to IP rehab, however will monitor progress closely.  -CL     Row Name 02/10/21 0920          Vital Signs    Pre Systolic BP Rehab  -- VSS  -CL     O2 Delivery Pre Treatment  nasal cannula  -CL     O2 Delivery Post Treatment  nasal cannula  -CL     Pre Patient Position  Supine  -CL     Intra Patient Position  Standing  -CL     Post Patient Position  Sitting  -CL     Row Name 02/10/21 0920          Positioning and Restraints    Pre-Treatment Position  in bed  -CL     Post Treatment Position  chair  -CL     In Chair  notified nsg;reclined;call light within reach;encouraged to call for assist;exit alarm on;waffle cushion;on mechanical lift sling;legs elevated  -CL       User Key  (r) = Recorded By, (t) = Taken By, (c) = Cosigned By    Initials Name Provider Type    CL Susannah Ronquillo, SONAL Occupational Therapist        Outcome Measures     Row Name 02/10/21 0921          How much help from another is currently needed...    Putting on and taking off regular lower body clothing?  2  -CL     Bathing (including washing, rinsing, and drying)  2  -CL     Toileting (which includes using toilet bed pan or urinal)  2  -CL     Putting on and taking off regular upper body clothing  2  -CL     Taking care of personal  grooming (such as brushing teeth)  3  -CL     Eating meals  1  -CL     AM-PAC 6 Clicks Score (OT)  12  -CL     Row Name 02/10/21 0921          Functional Assessment    Outcome Measure Options  AM-PAC 6 Clicks Daily Activity (OT)  -CL       User Key  (r) = Recorded By, (t) = Taken By, (c) = Cosigned By    Initials Name Provider Type    CL Susannah Ronquillo OT Occupational Therapist        Occupational Therapy Education                 Title: PT OT SLP Therapies (In Progress)     Topic: Occupational Therapy (In Progress)     Point: ADL training (In Progress)     Description:   Instruct learner(s) on proper safety adaptation and remediation techniques during self care or transfers.   Instruct in proper use of assistive devices.              Learning Progress Summary           Patient Acceptance, E, NR by CL at 2/10/2021 0922    Acceptance, E, NR by CL at 2/9/2021 1405    Acceptance, E, NR by CL at 2/5/2021 1134                   Point: Home exercise program (Not Started)     Description:   Instruct learner(s) on appropriate technique for monitoring, assisting and/or progressing therapeutic exercises/activities.              Learner Progress:  Not documented in this visit.          Point: Precautions (In Progress)     Description:   Instruct learner(s) on prescribed precautions during self-care and functional transfers.              Learning Progress Summary           Patient Acceptance, E, NR by CL at 2/10/2021 0922    Acceptance, E, NR by CL at 2/9/2021 1405    Acceptance, E, NR by CL at 2/5/2021 1134                   Point: Body mechanics (In Progress)     Description:   Instruct learner(s) on proper positioning and spine alignment during self-care, functional mobility activities and/or exercises.              Learning Progress Summary           Patient Acceptance, E, NR by CL at 2/10/2021 0922    Acceptance, E, NR by CL at 2/9/2021 1405    Acceptance, E, NR by CL at 2/5/2021 1134                               User Key      Initials Effective Dates Name Provider Type Discipline    CL 04/03/18 -  Susannah Ronquillo OT Occupational Therapist OT              OT Recommendation and Plan  Therapy Frequency (OT): daily  Plan of Care Review  Plan of Care Reviewed With: patient  Progress: improving  Outcome Summary: Pt demo significantly improved activity tolerance and independence this date. Pt is Min Ax1+1 to ambulate 150 ft w/ RW and Min Ax2 for t/fs. Pt conts to require significant assist for ADL performance d/t generalized weakness though demo excellent effort. Recommend cont skilled IPOT POC. Recommend pt DC to IP rehab, however will monitor progress closely.     Time Calculation:   Time Calculation- OT     Row Name 02/10/21 0922             Time Calculation- OT    OT Start Time  0820  -CL      OT Received On  02/10/21  -      OT Goal Re-Cert Due Date  02/15/21  -         Timed Charges    05890 - OT Therapeutic Activity Minutes  30  -CL        User Key  (r) = Recorded By, (t) = Taken By, (c) = Cosigned By    Initials Name Provider Type    CL Susannah Ronquillo OT Occupational Therapist        Therapy Charges for Today     Code Description Service Date Service Provider Modifiers Qty    49876316819 HC OT THERAPEUTIC ACT EA 15 MIN 2/9/2021 Susannah Ronquillo OT GO 1    78358281037 HC OT THERAPEUTIC ACT EA 15 MIN 2/10/2021 Susannah Ronquillo OT GO 2    49529560244 HC OT THER SUPP EA 15 MIN 2/10/2021 Susannah Ronquillo OT GO 2               Susannah Ronquillo OT  2/10/2021

## 2021-02-10 NOTE — PROGRESS NOTES
Clinical Nutrition   Reason For Visit: MDR, Follow-up protocol, EN    Patient Name: Titus Bro  YOB: 1975  MRN: [id number]  Date of Encounter: 02/10/21 11:10 EST  Admission date: 1/19/2021      Nutrition Assessment     Admission Problem List:  Acute pancreatitis. Alcoholic +/- hyperlipidemia induced  Alcohol abuse  LIANA, improved  Alcohol withdrawal   Hypertriglyceridemia  Fatty liver  Acute hypoxemic respiratory failure requiring NIV. ICU transfer 1/22/2021  Vent (1/24) --> extubated (1/31)  Fevers  Ileus  Hypophosphatemia, resolved  Per CT abdomen/pelvis (1/27)- pancreatic head- acute necrotic pancreatitis   KUB (2/10)- The bowel gas pattern is nonspecific without evidence of obstruction.      Applicable PMH:  HTN  GERD  COPD  Seizure 2/2 EtOH withdrawal      Applicable Nutrition-Related Information:  (1/24) EN ordered per intensivist- Peptamen Intense VHP at 75 ml/hr and free water at 55 ml/hr via NGT  (1/25) EN rate decreased per RD 2/2 hypophosphatemia- VHP at 20 ml/hr  (1/26) EN rate increased per RD- 100 ml/hr  (1/27) Pt had residual of 500 ml this AM, EN held. NDT placed and EN restarted via NDT  (1/28) Pt tolerating EN via NDT  (1/30) free water decreased to 30 ml/hr per intensivist  (2/1) Per previous RN notes, pt had 1150 ml out NGT on (1/29), EN was then held ~1800. EN restarted at trophic rate on (1/30). EN held on (1/31) and then restarted later that day at 25 ml/hr. EN currently at 60 ml/hr and pt tolerating, advancing slowly to goal rate as pt tolerates.   (2/3) EN has been on hold overnight 2/2 pt complaining of needing a bowel movement. GI planning on giving pt mag citrate. OK per GI and intensivist to restart EN at goal rate at this time. Will increase free water up to 50 ml/hr secondary to continued hypernatremia.   (2/9) NGT removed; NDT became dislodged and then re-advanced- ND-2  (2/10) SLP FEES rec regular textures, thin liquids (RD spoke to SLP in the hallway)        Reported/Observed/Food/Nutrition Related History     RN reports that EN has been on hold since last night (2/9) secondary to pt having increasing abdominal pain, pt vomited ~0200. Pt receiving FEES this AM. OK per intensivist to start solid foods/low fat diet if pt passes FEES and OK to d/c keofeed today. RD spoke to SLP in the hallway, who states that pt is OK to have regular textures, thin liquids. RD placed order in [facility] prior to charting.    Pt sitting up in chair at time of RD visit. Pt reports that he feels hungry, looking forward to eating lunch. States that he does not like milk-based oral nutrition supplements, but that he is open to trying berry Boost Breeze.    Pt provided some food preferences:  Likes: scrambled eggs, sausage, cola, chicken salad sandwich, pineapple, iced tea    Per I&O over the past 24 hrs:  4 bowel movements    Anthropometrics   Height: 74 in  Weight: 327 lb (1/22), no method documented  BMI: 42.0  BMI classification: Obese Class III extreme obesity: > or equal to 40kg/m2   IBW: 190 lb    Date Weight (kg) Weight (lbs) Weight Method   2/10/2021 201.216 kg 443 lb 9.6 oz Bed scale   2/1/2021 206.341 kg  454 lb 14.4 oz  Bed scale   1/22/2021 148.326 kg 327 lb -   1/22/2021 148.4 kg 327 lb 2.6 oz -   1/19/2021 151.955 kg 335 lb -   1/19/2021 151.955 kg 335 lb Stated   6/10/2020 151.955 kg 335 lb -   8/7/2019 144.244 kg 318 lb Stated   7/17/2017 129.275 kg 285 lb Stated       Labs reviewed   Labs reviewed: Yes    Results from last 7 days   Lab Units 02/10/21  0601 02/09/21  0906 02/08/21  0330   SODIUM mmol/L 134* 138 138   POTASSIUM mmol/L 4.6 4.5 3.9   CHLORIDE mmol/L 98 99 99   CO2 mmol/L 23.0 32.0* 30.0*   BUN mg/dL 15 17 14   CREATININE mg/dL 0.64* 0.58* 0.55*   GLUCOSE mg/dL 95 131* 122*   CALCIUM mg/dL 9.4 9.6 9.1   PHOSPHORUS mg/dL 4.0 3.5 4.1   MAGNESIUM mg/dL 2.0 1.9 1.9     Results from last 7 days   Lab Units 02/09/21  0321 02/08/21  0330 02/07/21  0319  02/05/21  0424   WBC  10*3/mm3 10.49 9.51 9.62   < > 8.02   ALBUMIN g/dL  --   --   --   --  2.60*    < > = values in this interval not displayed.     Results from last 7 days   Lab Units 02/05/21  1238 02/04/21  2321 02/04/21  1711 02/04/21  1140 02/04/21  0525 02/03/21  2332   GLUCOSE mg/dL 96 124 115 104 136* 123     No results found for: HGBA1C  Medications reviewed   Medications reviewed: Yes  Pertinent: dulcolax, valium, colace, folic acid, relistor, oxycodone, protonix, miralax, senokot, thiamin  PRN: tylenol, valium, zofran, oxycodone, simethicone    Pt received enema (2/10)    Needs Assessment  (2/10)   Height used: 74 in/188 cm  Weight used: 327 lb/148 kg  IBW: 190 lb/86 kg    Estimated Calories needs: ~2000 kcal/day  14 kcal/kg actual weight= 2072 kcal    Estimated Protein needs:   2.0-2.5 g/kg IBW= 172-215 g pro      Current Nutrition Prescription   PO: Diet Regular; Low Fat -- placed order in [facility] prior to charting (2/10)       EN: Peptamen Intense VHP   Diet, Tube Feeding Tube Feeding Formula: Peptamen Intense VHP (Peptide Based, Very High Protein) at 100 ml/hr (goal volume= 2000 ml/day) and free water at 50 ml/hr  Route: ND  Verified at bedside: Yes -- on hold since yesterday (2/9)  Provides at goal volume: 2000 kcal, 184 g pro, 8 g fiber, 1680 ml water from EN, 2680 ml water total      Evaluation of Received Nutrient/Fluid Intake-PO:  Insufficient data -- PO diet to start today (2/10)      Evaluation of Received Nutrient/Fluid Intake-EN:  1 Day:   1075 ml, 54%    Nutrition Diagnosis   1/25  Problem Less than optimal enteral nutrition regimen    Etiology Clinical status/lab values/needs assessment    Signs/Symptoms Hypophosphatemia     resolved      1/22  Problem Inadequate energy intake, inadequate protein intake   Etiology Clinical status   Signs/Symptoms NPO/Clear liquids x 72hrs     Status: EN started (1/24)      Intervention   Intervention: Follow treatment progress, Care plan reviewed, Interview for preferences, Menu  adjusted, Encourage intake, Supplement provided   -Will place PO diet in Epic- regular/low fat  -Will send berry Boost Breeze 3x/day  -Food preferences noted and sent to the diet office  -OK per intensivist to d/c keofeed/EN if pt passes FEES eval and starts PO diet    Goal:   General: Nutrition support treatment  PO: Tolerate PO, Initiate diet   EN/PN: d/c EN per intensivist      Monitoring/Evaluation:   Monitoring/Evaluation: Per protocol, I&O, PO intake, Supplement intake, Pertinent labs, Weight, GI status, Symptoms, Swallow function    Lynette Silver, MS RD/LD CNSC  Time Spent: 35 minutes

## 2021-02-11 VITALS
SYSTOLIC BLOOD PRESSURE: 137 MMHG | HEART RATE: 92 BPM | DIASTOLIC BLOOD PRESSURE: 86 MMHG | HEIGHT: 74 IN | RESPIRATION RATE: 20 BRPM | OXYGEN SATURATION: 94 % | TEMPERATURE: 98.2 F | BODY MASS INDEX: 40.43 KG/M2 | WEIGHT: 315 LBS

## 2021-02-11 LAB
ANION GAP SERPL CALCULATED.3IONS-SCNC: 12 MMOL/L (ref 5–15)
BUN SERPL-MCNC: 11 MG/DL (ref 6–20)
BUN/CREAT SERPL: 14.9 (ref 7–25)
CALCIUM SPEC-SCNC: 9.8 MG/DL (ref 8.6–10.5)
CHLORIDE SERPL-SCNC: 96 MMOL/L (ref 98–107)
CO2 SERPL-SCNC: 26 MMOL/L (ref 22–29)
CREAT SERPL-MCNC: 0.74 MG/DL (ref 0.76–1.27)
DEPRECATED RDW RBC AUTO: 42.5 FL (ref 37–54)
ERYTHROCYTE [DISTWIDTH] IN BLOOD BY AUTOMATED COUNT: 13.8 % (ref 12.3–15.4)
GFR SERPL CREATININE-BSD FRML MDRD: 138 ML/MIN/1.73
GLUCOSE SERPL-MCNC: 109 MG/DL (ref 65–99)
HCT VFR BLD AUTO: 36.7 % (ref 37.5–51)
HGB BLD-MCNC: 11.4 G/DL (ref 13–17.7)
MAGNESIUM SERPL-MCNC: 1.8 MG/DL (ref 1.6–2.6)
MCH RBC QN AUTO: 26.4 PG (ref 26.6–33)
MCHC RBC AUTO-ENTMCNC: 31.1 G/DL (ref 31.5–35.7)
MCV RBC AUTO: 85 FL (ref 79–97)
PHOSPHATE SERPL-MCNC: 4.6 MG/DL (ref 2.5–4.5)
PLATELET # BLD AUTO: 612 10*3/MM3 (ref 140–450)
PMV BLD AUTO: 11.1 FL (ref 6–12)
POTASSIUM SERPL-SCNC: 4.2 MMOL/L (ref 3.5–5.2)
RBC # BLD AUTO: 4.32 10*6/MM3 (ref 4.14–5.8)
SODIUM SERPL-SCNC: 134 MMOL/L (ref 136–145)
WBC # BLD AUTO: 7.2 10*3/MM3 (ref 3.4–10.8)

## 2021-02-11 PROCEDURE — 97110 THERAPEUTIC EXERCISES: CPT

## 2021-02-11 PROCEDURE — 97530 THERAPEUTIC ACTIVITIES: CPT

## 2021-02-11 PROCEDURE — 84100 ASSAY OF PHOSPHORUS: CPT | Performed by: INTERNAL MEDICINE

## 2021-02-11 PROCEDURE — 83735 ASSAY OF MAGNESIUM: CPT | Performed by: INTERNAL MEDICINE

## 2021-02-11 PROCEDURE — 99231 SBSQ HOSP IP/OBS SF/LOW 25: CPT | Performed by: PSYCHIATRY & NEUROLOGY

## 2021-02-11 PROCEDURE — 85027 COMPLETE CBC AUTOMATED: CPT | Performed by: INTERNAL MEDICINE

## 2021-02-11 PROCEDURE — 94660 CPAP INITIATION&MGMT: CPT

## 2021-02-11 PROCEDURE — 80048 BASIC METABOLIC PNL TOTAL CA: CPT | Performed by: INTERNAL MEDICINE

## 2021-02-11 PROCEDURE — 97116 GAIT TRAINING THERAPY: CPT

## 2021-02-11 PROCEDURE — 94799 UNLISTED PULMONARY SVC/PX: CPT

## 2021-02-11 PROCEDURE — 25010000002 ENOXAPARIN PER 10 MG: Performed by: FAMILY MEDICINE

## 2021-02-11 PROCEDURE — 99239 HOSP IP/OBS DSCHRG MGMT >30: CPT | Performed by: INTERNAL MEDICINE

## 2021-02-11 RX ORDER — DIAZEPAM 5 MG/1
5 TABLET ORAL DAILY
Status: DISCONTINUED | OUTPATIENT
Start: 2021-02-16 | End: 2021-02-11 | Stop reason: HOSPADM

## 2021-02-11 RX ORDER — DIAZEPAM 2 MG/1
2 TABLET ORAL DAILY
Status: DISCONTINUED | OUTPATIENT
Start: 2021-02-18 | End: 2021-02-11 | Stop reason: HOSPADM

## 2021-02-11 RX ORDER — DIAZEPAM 5 MG/1
10 TABLET ORAL DAILY
Status: DISCONTINUED | OUTPATIENT
Start: 2021-02-14 | End: 2021-02-11 | Stop reason: HOSPADM

## 2021-02-11 RX ORDER — DIAZEPAM 2 MG/1
2 TABLET ORAL DAILY
Qty: 2 TABLET | Refills: 0 | Status: SHIPPED | OUTPATIENT
Start: 2021-02-17 | End: 2021-02-19

## 2021-02-11 RX ORDER — CELECOXIB 400 MG/1
400 CAPSULE ORAL DAILY
Start: 2021-02-11

## 2021-02-11 RX ORDER — DIAZEPAM 5 MG/1
5 TABLET ORAL DAILY
Qty: 2 TABLET | Refills: 0 | Status: SHIPPED | OUTPATIENT
Start: 2021-02-15 | End: 2021-02-17

## 2021-02-11 RX ORDER — DIAZEPAM 10 MG/1
10 TABLET ORAL DAILY
Qty: 2 TABLET | Refills: 0 | Status: SHIPPED | OUTPATIENT
Start: 2021-02-13 | End: 2021-02-15

## 2021-02-11 RX ORDER — ESCITALOPRAM OXALATE 20 MG/1
20 TABLET ORAL DAILY
Status: DISCONTINUED | OUTPATIENT
Start: 2021-02-11 | End: 2021-02-11 | Stop reason: HOSPADM

## 2021-02-11 RX ORDER — FOLIC ACID 1 MG/1
1 TABLET ORAL DAILY
Status: DISCONTINUED | OUTPATIENT
Start: 2021-02-11 | End: 2021-02-11 | Stop reason: HOSPADM

## 2021-02-11 RX ORDER — ACETAMINOPHEN 325 MG/1
650 TABLET ORAL EVERY 6 HOURS PRN
Status: DISCONTINUED | OUTPATIENT
Start: 2021-02-11 | End: 2021-02-11 | Stop reason: HOSPADM

## 2021-02-11 RX ORDER — DIAZEPAM 5 MG/1
10 TABLET ORAL EVERY 12 HOURS
Status: DISCONTINUED | OUTPATIENT
Start: 2021-02-12 | End: 2021-02-11 | Stop reason: HOSPADM

## 2021-02-11 RX ORDER — CLONIDINE HYDROCHLORIDE 0.1 MG/1
0.1 TABLET ORAL EVERY 12 HOURS SCHEDULED
Status: DISCONTINUED | OUTPATIENT
Start: 2021-02-11 | End: 2021-02-11 | Stop reason: HOSPADM

## 2021-02-11 RX ORDER — AMOXICILLIN 250 MG
2 CAPSULE ORAL 2 TIMES DAILY
Status: DISCONTINUED | OUTPATIENT
Start: 2021-02-11 | End: 2021-02-11 | Stop reason: HOSPADM

## 2021-02-11 RX ORDER — DIAZEPAM 10 MG/1
10 TABLET ORAL EVERY 12 HOURS
Qty: 4 TABLET | Refills: 0 | Status: SHIPPED | OUTPATIENT
Start: 2021-02-11 | End: 2021-02-13

## 2021-02-11 RX ORDER — BISACODYL 5 MG/1
10 TABLET, DELAYED RELEASE ORAL DAILY
Status: DISCONTINUED | OUTPATIENT
Start: 2021-02-11 | End: 2021-02-11 | Stop reason: HOSPADM

## 2021-02-11 RX ADMIN — POLYETHYLENE GLYCOL 3350 17 G: 17 POWDER, FOR SOLUTION ORAL at 09:01

## 2021-02-11 RX ADMIN — DOCUSATE SODIUM 50MG AND SENNOSIDES 8.6MG 2 TABLET: 8.6; 5 TABLET, FILM COATED ORAL at 09:01

## 2021-02-11 RX ADMIN — CLONIDINE HYDROCHLORIDE 0.1 MG: 0.1 TABLET ORAL at 09:01

## 2021-02-11 RX ADMIN — METOPROLOL TARTRATE 25 MG: 25 TABLET, FILM COATED ORAL at 09:01

## 2021-02-11 RX ADMIN — DIAZEPAM 10 MG: 5 TABLET ORAL at 06:44

## 2021-02-11 RX ADMIN — Medication 1 PATCH: at 09:02

## 2021-02-11 RX ADMIN — DIAZEPAM 10 MG: 5 TABLET ORAL at 12:04

## 2021-02-11 RX ADMIN — THIAMINE HCL TAB 100 MG 250 MG: 100 TAB at 09:00

## 2021-02-11 RX ADMIN — IPRATROPIUM BROMIDE AND ALBUTEROL SULFATE 3 ML: 2.5; .5 SOLUTION RESPIRATORY (INHALATION) at 07:34

## 2021-02-11 RX ADMIN — FOLIC ACID 1 MG: 1 TABLET ORAL at 09:01

## 2021-02-11 RX ADMIN — SODIUM CHLORIDE, PRESERVATIVE FREE 10 ML: 5 INJECTION INTRAVENOUS at 09:02

## 2021-02-11 RX ADMIN — ENOXAPARIN SODIUM 40 MG: 40 INJECTION SUBCUTANEOUS at 09:02

## 2021-02-11 RX ADMIN — ACETAMINOPHEN ORAL SOLUTION 650 MG: 650 SOLUTION ORAL at 02:25

## 2021-02-11 RX ADMIN — PANTOPRAZOLE SODIUM 40 MG: 40 TABLET, DELAYED RELEASE ORAL at 09:01

## 2021-02-11 RX ADMIN — ESCITALOPRAM OXALATE 20 MG: 20 TABLET ORAL at 09:01

## 2021-02-11 RX ADMIN — BISACODYL 10 MG: 5 TABLET ORAL at 09:01

## 2021-02-11 NOTE — THERAPY TREATMENT NOTE
Patient Name: Titus Bor  : 1975    MRN: [id number]                              Today's Date: 2021       Admit Date: 2021    Visit Dx:     ICD-10-CM ICD-9-CM   1. Acute pancreatitis, unspecified complication status, unspecified pancreatitis type  K85.90 577.0     Patient Active Problem List   Diagnosis   • Acute pancreatitis. Alcoholic +/- hyperlipidemia induced   • Alcohol abuse   • Lactic acidosis   • Essential hypertension   • GERD without esophagitis   • Class 3 severe obesity in adult    • LIANA   • Alcohol withdrawal    • Hypertriglyceridemia   • Fatty liver   • Acute hypoxemic respiratory failure requiring NIV. ICU transfer 2021 (CMS/[facility])   • Fever and increased procalcitonin. ? source     Past Medical History:   Diagnosis Date   • Anxiety    • Arthritis    • Asthma    • COPD (chronic obstructive pulmonary disease) (CMS/HCC)    • GERD (gastroesophageal reflux disease)    • H/O chest tube placement    • Hypertension    • Seizures (CMS/HCC)     WITHDRAWAL     Past Surgical History:   Procedure Laterality Date   • KNEE ARTHROSCOPY Bilateral    • WRIST ARTHROPLASTY Left      General Information     Row Name 21 0933          Physical Therapy Time and Intention    Document Type  therapy note (daily note)  -AY     Mode of Treatment  physical therapy  -AY     Row Name 21 0933          General Information    Patient Profile Reviewed  yes  -AY     Existing Precautions/Restrictions  fall;oxygen therapy device and L/min  -AY     Row Name 2133          Cognition    Orientation Status (Cognition)  oriented x 4  -AY     Row Name 2133          Safety Issues, Functional Mobility    Impairments Affecting Function (Mobility)  balance;endurance/activity tolerance;strength  -AY     Cognitive Impairments, Mobility Safety/Performance  impulsivity;insight into deficits/self-awareness;judgment;safety precaution awareness;safety precaution follow-through  -AY       User Key  (r) =  Recorded By, (t) = Taken By, (c) = Cosigned By    Initials Name Provider Type    AY Yani Velasquez, PT Physical Therapist        Mobility     Row Name 02/11/21 0933          Bed Mobility    Bed Mobility  supine-sit  -AY     Supine-Sit [place] (Bed Mobility)  minimum assist (75% patient effort);1 person assist;verbal cues  -AY     Assistive Device (Bed Mobility)  draw sheet;head of bed elevated;bed rails  -AY     Comment (Bed Mobility)  [place]bale cueing for hand placement and sequencing.  -AY     Row Name 02/11/21 0933          Transfers    Comment (Transfers)  STSx3 CGA to stand from EOB and recliner. Min A to stand from toilet. VC for hand placement, rocking, and sequencing.  -AY     Row Name 02/11/21 0933          Sit-Stand Transfer    Sit-Stand [place] (Transfers)  minimum assist (75% patient effort);verbal cues;1 person assist  -AY     Assistive Device (Sit-Stand Transfers)  walker, front-wheeled  -AY     Row Name 02/11/21 0933          Gait/Stairs (Locomotion)    [place] Level (Gait)  verbal cues;1 person assist;contact guard  -AY     Assistive Device (Gait)  walker, front-wheeled  -AY     Distance in Feet (Gait)  200  -AY     Deviations/Abnormal Patterns (Gait)  bilateral deviations;zacarias decreased;weight shifting decreased  -AY     Bilateral Gait Deviations  forward flexed posture;heel strike decreased  -AY     Comment (Gait/Stairs)  Pt amb 200ft with RWx demonstrating step through gait pattern with decreased zacarias and decreased gogo lstrike bilaterally. Verbal cueing for posture, heel strike and decreased weight bearing through BUE. Pt required verbal cueing for walker management and obsticle navigation. Gait distance limited by fatigue.  -AY       User Key  (r) = Recorded By, (t) = Taken By, (c) = Cosigned By    Initials Name Provider Type    Yani Graham, PT Physical Therapist        Obj/Interventions     Row Name 02/11/21 0936          Motor Skills    Therapeutic Exercise   hip;knee;ankle;other (see comments) verbal cueing for control and proper technique.  -AY     Row Name 02/11/21 0936          Hip (Therapeutic Exercise)    Hip (Therapeutic Exercise)  strengthening exercise  -AY     Hip Strengthening (Therapeutic Exercise)  bilateral;marching while seated;external rotation;internal rotation;aBduction;aDduction;sitting;10 repetitions  -AY     Row Name 02/11/21 0936          Knee (Therapeutic Exercise)    Knee (Therapeutic Exercise)  strengthening exercise  -AY     Knee Strengthening (Therapeutic Exercise)  bilateral;LAQ (long arc quad);SLR (straight leg raise);sitting;10 repetitions  -AY     Row Name 02/11/21 0936          Ankle (Therapeutic Exercise)    Ankle (Therapeutic Exercise)  AROM (active range of motion)  -AY     Ankle AROM (Therapeutic Exercise)  bilateral;dorsiflexion;plantarflexion;10 repetitions;sitting  -AY     Row Name 02/11/21 0936          Balance    Balance Assessment  sitting static balance;sitting dynamic balance;standing static balance;standing dynamic balance  -AY     Static Sitting Balance  WFL;sitting, edge of bed  -AY     Dynamic Sitting Balance  WFL;sitting, edge of bed  -AY     Static Standing Balance  WFL;supported;standing  -AY     Dynamic Standing Balance  WFL;supported;standing  -AY     Balance Interventions  sitting;standing;weight shifting activity;sit to stand;supported  -AY       User Key  (r) = Recorded By, (t) = Taken By, (c) = Cosigned By    Initials Name Provider Type    AY Yani Velasquez PT Physical Therapist        Goals/Plan    No documentation.       Clinical Impression     Row Name 02/11/21 0937          Pain    Additional Documentation  Pain Scale: Numbers Pre/Post-Treatment (Group)  -AY     Row Name 02/11/21 0937          Pain Scale: Numbers Pre/Post-Treatment    Pretreatment Pain Rating  0/10 - no pain  -AY     Posttreatment Pain Rating  0/10 - no pain  -AY     Pre/Posttreatment Pain Comment  Tolerated. RN aware and present.  -AY     Pain  Intervention(s)  Ambulation/increased activity;Repositioned  -AY     Row Name 02/11/21 0937          Plan of Care Review    Plan of Care Reviewed With  patient  -AY     Progress  improving  -AY     Outcome Summary  Pt showing improvement as he ambulated without supp oxygen and requiring decreased assistance, ambulating 200ft wiht RWx and CGA, requiring multiple VC for safety awareness, posture, and obsticle navigation. Pt required min A to stand from toilet. Pt educated on home safety, pacing, HEP, and transfer/gait techniques to improve safety. Continue to progress as able. D/c rec remains for IRF, but if pt refuses, HWA and HH PT requried with RWx.  -AY     Row Name 02/11/21 0937          Vital Signs    Pre Systolic BP Rehab  144  -AY     Pre Treatment Diastolic BP  104  -AY     Post Systolic BP Rehab  151  -AY     Post Treatment Diastolic BP  82  -AY     Pretreatment Heart Rate (beats/min)  85  -AY     Posttreatment Heart Rate (beats/min)  91  -AY     Pre SpO2 (%)  94  -AY     O2 Delivery Pre Treatment  room air  -AY     O2 Delivery Intra Treatment  room air  -AY     Post SpO2 (%)  95  -AY     O2 Delivery Post Treatment  room air  -AY     Pre Patient Position  Supine  -AY     Intra Patient Position  Standing  -AY     Post Patient Position  Sitting  -AY     Row Name 02/11/21 0937          Positioning and Restraints    Pre-Treatment Position  in bed  -AY     Post Treatment Position  chair  -AY     In Chair  notified nsg;reclined;sitting;call light within reach;encouraged to call for assist;exit alarm on;waffle cushion;legs elevated  -AY       User Key  (r) = Recorded By, (t) = Taken By, (c) = Cosigned By    Initials Name Provider Type    AY Yani Velasquez, PT Physical Therapist        Outcome Measures     Row Name 02/11/21 0941          How much help from another person do you currently need...    Turning from your back to your side while in flat bed without using bedrails?  3  -AY     Moving from lying on back to  sitting on the side of a flat bed without bedrails?  3  -AY     Moving to and from a bed to a chair (including a wheelchair)?  3  -AY     Standing up from a chair using your arms (e.g., wheelchair, bedside chair)?  3  -AY     Climbing 3-5 steps with a railing?  2  -AY     To walk in hospital room?  3  -AY     AM-PAC 6 Clicks Score (PT)  17  -AY     Row Name 02/11/21 0941          Functional Assessment    Outcome Measure Options  AM-PAC 6 Clicks Basic Mobility (PT)  -AY       User Key  (r) = Recorded By, (t) = Taken By, (c) = Cosigned By    Initials Name Provider Type    Yani Graham, BILLY Physical Therapist        Physical Therapy Education                 Title: PT OT SLP Therapies (In Progress)     Topic: Physical Therapy (Done)     Point: Mobility training (Done)     Learning Progress Summary           Patient Acceptance, TB,E, VU,NR by AY at 2/11/2021 0941    Acceptance, E,D, VU,NR by LS at 2/10/2021 1620    Acceptance, E,D, VU,NR by LS at 2/9/2021 1419    Acceptance, E,D, NR by LS at 2/5/2021 1021                   Point: Home exercise program (Done)     Learning Progress Summary           Patient Acceptance, TB,E, VU,NR by AY at 2/11/2021 0941    Acceptance, E,D, VU,NR by LS at 2/10/2021 1620    Acceptance, E,D, VU,NR by LS at 2/9/2021 1419                   Point: Body mechanics (Done)     Learning Progress Summary           Patient Acceptance, TB,E, VU,NR by AY at 2/11/2021 0941    Acceptance, E,D, VU,NR by LS at 2/10/2021 1620    Acceptance, E,D, VU,NR by LS at 2/9/2021 1419    Acceptance, E,D, NR by LS at 2/5/2021 1021                   Point: Precautions (Done)     Learning Progress Summary           Patient Acceptance, TB,E, VU,NR by AY at 2/11/2021 0941    Acceptance, E,D, VU,NR by LS at 2/10/2021 1620    Acceptance, E,D, VU,NR by LS at 2/9/2021 1419    Acceptance, E,D, NR by LS at 2/5/2021 1021                               User Key     Initials Effective Dates Name Provider Type Discipline    LS  06/19/15 -  Martine Arciniega PT Physical Therapist PT    AY 11/10/20 -  Yani Velasquez PT Physical Therapist PT              PT Recommendation and Plan     Plan of Care Reviewed With: patient  Progress: improving  Outcome Summary: Pt showing improvement as he ambulated without supp oxygen and requiring decreased assistance, ambulating 200ft wiht RWx and CGA, requiring multiple VC for safety awareness, posture, and obsticle navigation. Pt required min A to stand from toilet. Pt educated on home safety, pacing, HEP, and transfer/gait techniques to improve safety. Continue to progress as able. D/c rec remains for IRF, but if pt refuses, HWA and HH PT requried with RWx.     Time Calculation:   PT Charges     Row Name 02/11/21 0942             Time Calculation    Start Time  0835  -AY      PT Received On  02/11/21  -AY         Time Calculation- PT    Total Timed Code Minutes- PT  53 minute(s)  -AY         Timed Charges    85767 - PT Therapeutic Exercise Minutes  23  -AY      92529 - Gait Training Minutes   20  -AY      69603 - PT Therapeutic Activity Minutes  13  -AY        User Key  (r) = Recorded By, (t) = Taken By, (c) = Cosigned By    Initials Name Provider Type    AY Yani Velasquez, PT Physical Therapist        Therapy Charges for Today     Code Description Service Date Service Provider Modifiers Qty    04623061144 HC PT THER PROC EA 15 MIN 2/11/2021 Yani Velasquez, PT GP 2    91490807370 HC GAIT TRAINING EA 15 MIN 2/11/2021 Yani Velasquez, PT GP 1    35721314835 HC PT THERAPEUTIC ACT EA 15 MIN 2/11/2021 Yani Velasquez, PT GP 1          PT G-Codes  Outcome Measure Options: AM-PAC 6 Clicks Basic Mobility (PT)  AM-PAC 6 Clicks Score (PT): 17  AM-PAC 6 Clicks Score (OT): 12    Yani Velasquez PT  2/11/2021

## 2021-02-11 NOTE — PLAN OF CARE
Goal Outcome Evaluation:  Plan of Care Reviewed With: patient  Progress: improving  Outcome Summary: VS stable on RA while awake and bipap with sleep. Pt more calm and agreeable with plan of care by bedtime, so his family went home. Cooperative throughout the night and rested some. Good po intake and uop. 2 small BMs. Pain controlled with prn roxicodone and tylenol.

## 2021-02-11 NOTE — PROGRESS NOTES
"Continued Stay Note  [facility]     Patient Name: Titus Bro  MRN: [id number]  Today's Date: 2/11/2021    Admit Date: 1/19/2021    Discharge Plan     Row Name 02/11/21 1100       Plan    Plan  Home with resources    Plan Comments  We met with this patient early on in his admission.  He states that \"he's one with alcohol\"  He had previously went to AUD treatment in July in [facility]- he stayed sober for about 4 months after that.  He is not interested in AUD treatment again, however, he is open to resources and [facility] resources.  He has excellent family support, however, his daughter appears more interested in the resources than the patient.  Thank you for asking us to see this pleasant gentleman.  He is going home with his daughter who assures us that she will help him remain sober.        Discharge Codes    No documentation.       Expected Discharge Date and Time     Expected Discharge Date Expected Discharge Time    Feb 12, 2021             Medina Odom RN ,BSN   Addiction Coordinator     "

## 2021-02-11 NOTE — PROGRESS NOTES
"Continued Stay Note  [facility]     Patient Name: Titus Bro  MRN: [id number]  Today's Date: 2/11/2021    Admit Date: 1/19/2021    Discharge Plan     Row Name 02/11/21 1258       Plan    Plan  Home with home health    Patient/Family in Agreement with Plan  yes    Plan Comments  Spoke with patient and daughter at bedside.  Patient is refusing inpatient rehab but is open to home with home health.  Patient is going home with his daughter Ryan Parsons address is [address] [place] [address].  Patient can be rached at daughters home at [phone].  Spoke with Hanna with [facility] and she has accepted referral for home health.  Order is in epic.  Bariatric walker ordered for patient through [facility].  Referral for walker faxed and Donell with [facility] notified.    Final Discharge Disposition Code  06 - home with home health care    Row Name 02/11/21 1100       Plan    Plan  Home with resources    Plan Comments  We met with this patient early on inhis admission.  He states that \"he's one with alcohol\"  He had previously went to AUD treatment in july in [facility]- he stayed sober for about 4 months after that.        Discharge Codes    No documentation.       Expected Discharge Date and Time     Expected Discharge Date Expected Discharge Time    Feb 11, 2021             Krystal Salmeron RN    "

## 2021-02-11 NOTE — DISCHARGE SUMMARY
DISCHARGE SUMMARY       Patient name: Titus Bro  CSN: [id number]  MRN: [id number]  : 1975    Date of Admission: 2021  Date of Discharge:  2021    Admitting Physician:  Sandy Rae DO  Primary Care Provider: Chema Greene MD  Consultations:       Admission Diagnosis:      Discharge Diagnoses:     Acute pancreatitis. Alcoholic +/- hyperlipidemia induced    Alcohol abuse    Essential hypertension    GERD without esophagitis    Class 3 severe obesity in adult     LIANA    Alcohol withdrawal     Hypertriglyceridemia    Fatty liver    Acute hypoxemic respiratory failure requiring NIV. ICU transfer 2021 (CMS/HCC)    Fever and increased procalcitonin. ? source      Procedures:         Imaging:  Ct Abdomen Pelvis With & Without Contrast    Result Date: 2021  Extensive inflammatory changes seen in fluid surrounding the pancreas with some low-density at the head. Atelectatic changes seen at the lung bases bilaterally. No loculated fluid collection identified at this time. Findings again suggesting severe acute pancreatitis.  DICTATED:   2021 EDITED/ls :   2021  This report was finalized on 2021 2:37 PM by Dr. Jackelin Pacheco MD.      Ct Abdomen Pelvis Without Contrast    Result Date: 2021  1. Motion degradation. 2. Evolving at least moderate acute pancreatitis with slight interval increase in volume of peripancreatic fluid and fluid tracking adjacent to the stomach. No well-defined drainable fluid collection or pseudocyst at this time. 3. Multiple secondary reactive changes involving the duodenum and interval development of at least moderate small and large bowel ileus. 4. Hepatic steatosis. 5. Appendix not clearly identified. 6. 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 Signer Name: Chuy Aleman MD  Signed: 2021 11:55 PM  Workstation Name: [facility]  [facility]    Ct Chest Without Contrast  Diagnostic    Result Date: 1/21/2021  1. Motion degraded study demonstrates probable areas of atelectasis. There are hazy groundglass opacities bilaterally but these are favored to be artifactual rather than reflective of atypical/viral infection. Correlate with exam. 2. No effusion or pneumothorax. 3. Hepatic steatosis and partially visualized findings suspicious for acute pancreatitis. Abdomen and pelvis CT dictated separately. Imaging features are atypical or uncommonly reported for COVID-19 pneumonia. Alternative diagnoses should be considered. Signer Name: Chuy Aleman MD  Signed: 1/21/2021 11:48 PM  Workstation Name: [facility]  [facility] [place]    Ct Abdomen Pelvis With Contrast    Result Date: 1/27/2021  Significant interval progression of fluid surrounding the pancreas. There is an area of nonenhancement involving the pancreatic head compatible with acute necrotic pancreatitis and acute necrotic collection. This ill-defined large surrounding fluid collection is not yet walled off. There is persistent secondary edema of the stomach and duodenum. No additional acute change from recent comparison.   This report was finalized on 1/27/2021 2:37 PM by Shakir Hmam.      Ct Abdomen Pelvis With Contrast    Result Date: 1/19/2021  1. Moderately extensive pancreatitis. No evidence of pancreatic pseudocyst, no evidence of pancreatic abscess or other pancreatic lesion. 2. Mild secondary inflammation of the second and third portions of the duodenum. 3. Extensive fatty liver change as noted on prior study. 4. Interval development of what appears to be avascular necrosis of the left femoral head.  D:  01/19/2021 E:  01/19/2021  This report was finalized on 1/19/2021 9:46 PM by Dr. Narendra Colon MD.      Xr Chest 1 View    Result Date: 2/2/2021  Low lung volumes with hypoventilatory effects and patchy opacifications in the midlungs stable to slightly increased from prior. No pneumothorax or pleural  effusion.  D:  02/02/2021 E:  02/02/2021  This report was finalized on 2/2/2021 10:53 AM by Dr. Apollo Ceron.      Xr Chest 1 View    Result Date: 2/1/2021  Removal of the endotracheal tube. There is slight improvement seen of the aeration of the lung fields in the interval. Lung volumes remain low.  D:  02/01/2021 E:  02/01/2021  This report was finalized on 2/1/2021 4:38 PM by Dr. Jackelin Pacheco MD.      Xr Chest 1 View    Result Date: 1/31/2021  Stable support hardware. Mildly increased airspace opacities which may be related to infection or edema.  This report was finalized on 1/31/2021 7:20 AM by Vipin Escobedo.      Xr Chest 1 View    Result Date: 1/30/2021  1. Low lung volumes with crowded pulmonary markings and left basal atelectasis.  This report was finalized on 1/30/2021 8:08 AM by Vipin Escobedo.      Xr Chest 1 View    Result Date: 1/29/2021  No new chest disease.  D:  01/29/2021 E:  01/29/2021    This report was finalized on 1/29/2021 10:02 PM by Dr. Narendra Colon MD.      Xr Chest 1 View    Result Date: 1/28/2021  Low lung volumes and patchy interstitial changes as described. No significant overall change from yesterday's study.   D:  01/28/2021 E:  01/28/2021  This report was finalized on 1/28/2021 10:03 PM by Dr. Narendra Colon MD.      Xr Chest 1 View    Result Date: 1/27/2021  Increased markings identified in the left lung base. Degenerative changes seen within the spine. Lines and tubes in satisfactory position.  D:  01/25/2021 E:  01/25/2021  This report was finalized on 1/27/2021 10:28 AM by Dr. Jackelin Pacheco MD.      Xr Chest 1 View    Result Date: 1/27/2021  Support hardware projects unchanged. Persistent low lung volumes with improved aeration of the left mid lung field and left lung base, likely diminished atelectasis and decreased effusion. No distinct pneumothorax. Unchanged heart and mediastinal contours.  This report was finalized on 1/27/2021 8:46 AM by Shakir Hamm.      Xr Chest 1  View    Result Date: 1/26/2021  Perihilar predominant pulmonary opacifications left greater than right with increase from prior comparison may represent components of vascular crowding given decreased lung volumes from prior along with increasing small left pleural effusion in the interim.  D:  01/26/2021 E:  01/26/2021  This report was finalized on 1/26/2021 9:28 AM by Dr. Apollo Ceron.      Xr Chest 1 View    Result Date: 1/24/2021   1. The ET tube in good position at the level of the clavicles.  2. NG tube in the stomach. PICC line in the distal SVC.  3. Improved aeration of the left lung base compared to prior study, and stable mild diffuse interstitial disease elsewhere.  DICTATED:   01/24/2021 EDITED/ls :   01/24/2021    This report was finalized on 1/24/2021 11:10 PM by Dr. Narendra Colon MD.      Xr Chest 1 View    Result Date: 1/24/2021  ET tube about 5 cm above the emil. Right arm approach PICC is in the SVC. Exam is otherwise not significantly changed. Signer Name: Jesus Guevara MD  Signed: 1/24/2021 2:10 AM  Workstation Name: [facility]  [facility]    Xr Chest 1 View    Result Date: 1/23/2021  1. Low lung volumes and mild but new bibasilar discoid atelectasis 2. NG tube below the left hemidiaphragm. 3. Cardiomegaly without evidence of overt congestive failure.  DICTATED:   01/23/2021 EDITED/ls :   01/23/2021  This report was finalized on 1/23/2021 10:31 PM by Dr. Narendra Colon MD.      Xr Chest 1 View    Result Date: 1/21/2021  . Low lung volumes accentuates cardiac silhouette and pulmonary markings. Bibasilar atelectasis. The opacity at the right base appears improved compared to the prior exam. I do question if there is subtle airspace disease scattered right chest however Signer Name: PRESLEY MADRIGAL MD  Signed: 1/21/2021 10:51 PM  Workstation Name: [facility]    Xr Chest 1 View    Result Date: 1/21/2021  Unchanged low lung volumes with some  scattered areas of basilar atelectasis. No new focal lobar consolidation. No significant pleural effusion or distinct pneumothorax. Unchanged heart and mediastinal contours.  This report was finalized on 1/21/2021 1:38 PM by Shakir Hamm.      Xr Chest 1 View    Result Date: 1/21/2021  1. Cardiomegaly and shallow lung expansion. No significant interval change. Signer Name: Chuy Aleman MD  Signed: 1/21/2021 12:24 AM  Workstation Name: [facility]    Xr Chest 1 View    Result Date: 1/20/2021  1. Cardiomegaly and bronchovascular crowding. Signer Name: Chuy Aleman MD  Signed: 1/20/2021 5:29 AM  Workstation Name: [facility]    Xr Abdomen Kub    Result Date: 2/10/2021  Feeding tube is in good position. Bowel gas pattern is nonspecific Signer Name: Ken Ronquillo MD  Signed: 2/10/2021 6:43 AM  Workstation Name: [facility]    Xr Abdomen Kub    Result Date: 2/9/2021  Nasogastric tube and Keofeed tube in position as described. Signer Name: Dax Valle MD  Signed: 2/9/2021 6:40 AM  Workstation Name: [facility]    Xr Abdomen Kub    Result Date: 2/9/2021  Nasogastric tube and Keofeed tube in position as described. Signer Name: Dax Valle MD  Signed: 2/9/2021 6:40 AM  Workstation Name: [facility]    Xr Abdomen Kub    Result Date: 2/5/2021  1. NG tube in the mid stomach. 2. Feeding tube in the proximal stomach.   D:  02/05/2021 E:  02/05/2021  This report was finalized on 2/5/2021 10:55 PM by Dr. Narendra Colon MD.      Xr Abdomen Kub    Result Date: 2/5/2021  Feeding tube has passed to the mid duodenum.  D:  02/05/2021 E:  02/05/2021     This report was finalized on 2/5/2021 10:55 PM by Dr. Narendra Colon MD.      Xr Abdomen Kub    Result Date: 2/3/2021  An enteric tube terminates in the distal duodenum. Evaluation is partially limited by  patient body habitus, again demonstrating diffuse segments of gas-filled and dilated small and large bowel compatible with obstruction or ileus, at most minimally improved from recent comparison.  This report was finalized on 2/3/2021 5:25 PM by Shakir Hamm.      Xr Abdomen Kub    Result Date: 1/30/2021  Feeding tube and nasogastric tube are appropriately positioned.  This report was finalized on 1/30/2021 9:12 AM by Vipin Escobedo.      Xr Abdomen Kub    Result Date: 1/27/2021  Feeding tube in the distal duodenum.  This report was finalized on 1/27/2021 2:58 PM by Shakir Hamm.      Xr Abdomen Kub    Result Date: 1/22/2021  Nasogastric tube identified tip in the stomach. Bowel gas pattern reveals mildly dilated loop of small bowel seen in the upper abdomen. Findings are stable.  D:  01/22/2021 E:  01/22/2021  This report was finalized on 1/22/2021 5:18 PM by Dr. Jackelin Pacheco MD.      Xr Abdomen Kub    Result Date: 1/21/2021  Nonspecific gaseous distention of small and large bowel. Pattern is overall nonobstructive as there is some air seen in the left lower quadrant large bowel. However, a partial obstruction cannot be excluded. Signer Name: PRESLEY MADRIGAL MD  Signed: 1/21/2021 10:50 PM  Workstation Name: [facility]  [facility]    Xr Abdomen Kub    Result Date: 1/20/2021  1. Nonspecific but nonobstructive bowel gas pattern. Signer Name: Chuy Aleman MD  Signed: 1/20/2021 11:22 PM  Workstation Name: [facility]  [facility]      History of Present Illness:  Titus Bro is a 46 y.o. male with PMHx of COPD, GERD, HTN, seizure secondary to alcohol withdrawal, and alcohol abuse (drinks a fifth of liquor daily), presents to the ED with CC of LUQ pain and nausea. Pt states his pain is similar to when he has had pancreatitis in the past. He denies diarrhea. No fever or chills. Pt given IV pain medication, anti-emetics, and IVF in the ED. [facility] medicine asked  "to admit.    Hospital Course:  Mr. Bro is a 47yo M with a history of ETOH abuse who was admitted on 1/19/21 with pancreatitis. He was admitted to Hospital Medicine but developed ETOH withdrawal necessitating transfer to the ICU on 1/22/21. He was placed on a Precedex drip and given IV Valium. He continued to require intermittent doses of Ativan despite this. He ultimately required intubation with mechanical ventilation on 1/24/21. A repeat CT of the abdomen/pelvis was performed on 1/27/21 due to worsening fevers and showed some possible necrosis in the head of the pancreas and worsening peripancreatic fluid collections. GI is following. He was extubated on 1/31/21. Precedex was finally stopped on 2/6/21 but had to be restarted on 2/7/21 due to agitation. Neurology was consulted and changed up his sedation regimen. He is now off Precedex and more alert. He passed his FEES on 2/10. He attempted to leave AMA on 2/10 but agreed to stay when his family would not take him home.     On the morning of 2/11/21, he was deemed acceptable for discharge. A walker was arranged per PT recommendations. He was seen by Chemical Dependency and given resources for continued abstinence from alcohol. He was agreeable to going home with his daughter who will ensure that he will not drink and therefore, he was sent with a Valium taper. He is still having some difficulty with bowel movements but is insistent this will improve when he is home. He will need a repeat CT scan of the abdomen/pelvis in 8 weeks to follow up his pancreatitis.     Vitals:  /83   Pulse 86   Temp 98.2 °F (36.8 °C) (Oral)   Resp 20   Ht 188 cm (74.02\")   Wt (!) 201 kg (442 lb 8 oz)   SpO2 93%   BMI 56.79 kg/m²     Physical Exam:  Telemetry:  Normal sinus rhythm.    Constitutional:  No acute distress.  Sitting up in a chair.    Eyes: No scleral icterus.   PERRL, EOM intact.    Neck:  Supple, FROM   Cardiovascular: Normal rate, regular and rhythm. Normal " heart sounds.  No murmurs, gallop or rub.   Respiratory: No respiratory distress. Normal respiratory effort.  Clear to auscultation bilaterally.    Abdominal:  Soft. No masses. Nontender. No distension. No HSM.   Extremities: No digital cyanosis. No clubbing.  1+ peripheral edema.   Skin: No rashes, lesions or ulcers   Neurological:             Alert and oriented x 3.         Labs:  Results from last 7 days   Lab Units 02/11/21  1138   WBC 10*3/mm3 7.20   HEMOGLOBIN g/dL 11.4*   HEMATOCRIT % 36.7*   PLATELETS 10*3/mm3 612*     Results from last 7 days   Lab Units 02/10/21  0601  02/05/21  0424   SODIUM mmol/L 134*   < > 143   POTASSIUM mmol/L 4.6   < > 3.9   CHLORIDE mmol/L 98   < > 103   CO2 mmol/L 23.0   < > 32.0*   BUN mg/dL 15   < > 14   CREATININE mg/dL 0.64*   < > 0.53*   CALCIUM mg/dL 9.4   < > 9.0   BILIRUBIN mg/dL  --   --  0.2   ALK PHOS U/L  --   --  62   ALT (SGPT) U/L  --   --  16   AST (SGOT) U/L  --   --  22   GLUCOSE mg/dL 95   < > 126*    < > = values in this interval not displayed.         Magnesium   Date Value Ref Range Status   02/10/2021 2.0 1.6 - 2.6 mg/dL Final   02/09/2021 1.9 1.6 - 2.6 mg/dL Final     Phosphorus   Date Value Ref Range Status   02/10/2021 4.0 2.5 - 4.5 mg/dL Final   02/09/2021 3.5 2.5 - 4.5 mg/dL Final           Discharge Medications:     Discharge Medications      Changes to Medications      Instructions Start Date   celecoxib 400 MG capsule  Commonly known as: CeleBREX  What changed: when to take this   400 mg, Oral, Daily         Continue These Medications      Instructions Start Date   amantadine 100 MG capsule  Commonly known as: SYMMETREL   100 mg, Oral, 2 Times Daily      cloNIDine 0.1 MG tablet  Commonly known as: CATAPRES   0.1 mg, Oral, 2 Times Daily      dicyclomine 20 MG tablet  Commonly known as: BENTYL   20 mg, Oral, Every 8 Hours PRN      escitalopram 20 MG tablet  Commonly known as: LEXAPRO   20 mg, Oral, Daily      folic acid 1 MG tablet  Commonly known as:  FOLVITE   1 mg, Oral, Daily      hydroCHLOROthiazide 25 MG tablet  Commonly known as: HYDRODIURIL   25 mg, Oral, Daily      hydrOXYzine pamoate 50 MG capsule  Commonly known as: VISTARIL   50 mg, Oral, As Needed      meloxicam 7.5 MG tablet  Commonly known as: MOBIC   7.5 mg, Oral, 2 Times Daily      methocarbamol 750 MG tablet  Commonly known as: ROBAXIN   750 mg, Oral, 2 Times Daily      metoprolol tartrate 25 MG tablet  Commonly known as: LOPRESSOR   25 mg, Oral, 2 Times Daily      omeprazole 40 MG capsule  Commonly known as: priLOSEC   40 mg, Oral, Daily      ondansetron 4 MG tablet  Commonly known as: ZOFRAN   4 mg, Oral, Every 8 Hours PRN      vitamin B-12 1000 MCG tablet  Commonly known as: CYANOCOBALAMIN   1,000 mcg, Oral, Daily             Discharge Diet:   Diet Instructions     Diet: Regular, Specialty Diet; Thin Liquids, No Restrictions; Low Fat      Discharge Diet:  Regular  Specialty Diet       Fluid Consistency: Thin Liquids, No Restrictions    Specialty Diets: Low Fat          Activity at Discharge:    Activity Instructions     Activity as Tolerated            Follow-up Appointments  No future appointments.  Additional Instructions for the Follow-ups that You Need to Schedule     Ambulatory Referral to Home Health   As directed      Face to Face Visit Date: 2/11/2021    Follow-up provider for Plan of Care?: I treated the patient in an acute care facility and will not continue treatment after discharge.    Follow-up provider: FILI CABALLERO [[id number]]    Reason/Clinical Findings: Acute pancreatitis. Alcoholic +/- hyperlipidemia induced ICD-10-CM: K85.90    Describe mobility limitations that make leaving home difficult: Acute pancreatitis. Alcoholic +/- hyperlipidemia induced ICD-10-CM: K85.90    Nursing/Therapeutic Services Requested: Physical Therapy    PT orders: Strengthening Home safety assessment Gait Training Therapeutic exercise    Weight Bearing Status: As Tolerated    Frequency: 1 Week 1          Discharge Follow-up with PCP   As directed       Currently Documented PCP:    Chema Greene MD    PCP Phone Number:    [phone]     Follow Up Details: 1 week               Discharge Instructions:  Follow up with pcp in 1 week.  Repeat CT scan of the abdomen/pelvis in 8 weeks  Outpatient resources an [facility] for ETOH cessation      Current Code Status     Date Active Code Status Order ID Comments User Context       1/19/2021 1408 CPR [id number]  Sandy Rae, DO Inpatient     Advance Care Planning Activity      Questions for Current Code Status     Question Answer Comment    Code Status CPR     Medical Interventions (Level of Support Prior to Arrest) Full     Level Of Support Discussed With Patient            María Elena Deras DNP, APRN, VLADIMIR-BC  Pulmonary and Critical Care Medicine  02/11/21     I have personally seen, interviewed and examined the patient and verified all the key components of the history, physical examination, assessment and plan with María Elena Deras DNP, APRN, VLADIMIR-BC and reviewed the note, which reflects my changes and contributions.    Jeanie V Case, DO  Pulmonary and Critical Care Medicine    Time: Discharge 35 min    CC: Chema Greene MD    *Please note that portions of this note were completed with a voice recognition program. Efforts were made to edit the dictations, but occasionally words are mistranscribed.

## 2021-02-11 NOTE — PAYOR COMM NOTE
"María Elena Garcia RN Utilization Review [phone]  Fax # [phone]  Ref # [id number]      Please note discharge date.     Debi Gunter (46 y.o. Male)     Date of Birth Social Security Number Address Home Phone MRN    1975  [address] [place] [address] [phone] [id number]    Sabianist Marital Status          Non-Jew Single       Admission Date Admission Type Admitting Provider Attending Provider Department, Room/Bed    21 Emergency CaseJeanie DO CaseJeanie DO [facility] 2A ICU, N207/    Discharge Date Discharge Disposition Discharge Destination         Home or Self Care              Attending Provider: Jeanie Page DO    Allergies: Penicillins    Isolation: None   Infection: None   Code Status: CPR    Ht: 188 cm (74.02\")   Wt: 201 kg (442 lb 8 oz)    Admission Cmt: None   Principal Problem: Acute pancreatitis. Alcoholic +/- hyperlipidemia induced [K85.90]                 Active Insurance as of 2021     Primary Coverage     Payor Plan Insurance Group Employer/Plan Group    [employer] [place] [employer] [place]      Payor Plan Address Payor Plan Phone Number Payor Plan Fax Number Effective Dates    [address] [address] [phone]  2017 - None Entered    [facility] [id number]       Subscriber Name Subscriber Birth Date Member ID       DEBI GUNTER 1975 [id number]                 Emergency Contacts      (Rel.) Home Phone Work Phone Mobile Phone    Roxanne Gunter (Daughter) -- -- [phone]    Shira Gunter (Daughter) [phone] -- --    kareem gunter (Friend) [phone] -- [phone]    Rea Mccurdy (Significant Other) [phone] -- --               Discharge Summary      Case Jeanie MCKAY DO at 21 1257          DISCHARGE SUMMARY       Patient name: Debi Gunter  CSN: [id number]  MRN: [id number]  : 1975    Date of Admission: 2021  Date of Discharge:  2021    Admitting Physician:  " Sandy Rae DO  Primary Care Provider: Chema Greene MD  Consultations:       Admission Diagnosis:      Discharge Diagnoses:     Acute pancreatitis. Alcoholic +/- hyperlipidemia induced    Alcohol abuse    Essential hypertension    GERD without esophagitis    Class 3 severe obesity in adult     LIANA    Alcohol withdrawal     Hypertriglyceridemia    Fatty liver    Acute hypoxemic respiratory failure requiring NIV. ICU transfer 1/22/2021 (CMS/[facility])    Fever and increased procalcitonin. ? source      Procedures:         Imaging:  Ct Abdomen Pelvis With & Without Contrast    Result Date: 2/7/2021  Extensive inflammatory changes seen in fluid surrounding the pancreas with some low-density at the head. Atelectatic changes seen at the lung bases bilaterally. No loculated fluid collection identified at this time. Findings again suggesting severe acute pancreatitis.  DICTATED:   02/07/2021 EDITED/ls :   02/07/2021  This report was finalized on 2/7/2021 2:37 PM by Dr. Jackelin Pacheco MD.      Ct Abdomen Pelvis Without Contrast    Result Date: 1/21/2021  1. Motion degradation. 2. Evolving at least moderate acute pancreatitis with slight interval increase in volume of peripancreatic fluid and fluid tracking adjacent to the stomach. No well-defined drainable fluid collection or pseudocyst at this time. 3. Multiple secondary reactive changes involving the duodenum and interval development of at least moderate small and large bowel ileus. 4. Hepatic steatosis. 5. Appendix not clearly identified. 6. 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 Signer Name: Chuy Aleman MD  Signed: 1/21/2021 11:55 PM  Workstation Name: [facility]  [facility]    Ct Chest Without Contrast Diagnostic    Result Date: 1/21/2021  1. Motion degraded study demonstrates probable areas of atelectasis. There are hazy groundglass opacities bilaterally but these are favored to be artifactual rather than  reflective of atypical/viral infection. Correlate with exam. 2. No effusion or pneumothorax. 3. Hepatic steatosis and partially visualized findings suspicious for acute pancreatitis. Abdomen and pelvis CT dictated separately. Imaging features are atypical or uncommonly reported for COVID-19 pneumonia. Alternative diagnoses should be considered. Signer Name: Chuy Aleman MD  Signed: 1/21/2021 11:48 PM  Workstation Name: [facility]  [facility]    Ct Abdomen Pelvis With Contrast    Result Date: 1/27/2021  Significant interval progression of fluid surrounding the pancreas. There is an area of nonenhancement involving the pancreatic head compatible with acute necrotic pancreatitis and acute necrotic collection. This ill-defined large surrounding fluid collection is not yet walled off. There is persistent secondary edema of the stomach and duodenum. No additional acute change from recent comparison.   This report was finalized on 1/27/2021 2:37 PM by Shakir Hamm.      Ct Abdomen Pelvis With Contrast    Result Date: 1/19/2021  1. Moderately extensive pancreatitis. No evidence of pancreatic pseudocyst, no evidence of pancreatic abscess or other pancreatic lesion. 2. Mild secondary inflammation of the second and third portions of the duodenum. 3. Extensive fatty liver change as noted on prior study. 4. Interval development of what appears to be avascular necrosis of the left femoral head.  D:  01/19/2021 E:  01/19/2021  This report was finalized on 1/19/2021 9:46 PM by Dr. Narendra Colon MD.      Xr Chest 1 View    Result Date: 2/2/2021  Low lung volumes with hypoventilatory effects and patchy opacifications in the midlungs stable to slightly increased from prior. No pneumothorax or pleural effusion.  D:  02/02/2021 E:  02/02/2021  This report was finalized on 2/2/2021 10:53 AM by Dr. Apollo Ceron.      Xr Chest 1 View    Result Date: 2/1/2021  Removal of the endotracheal tube. There is slight  improvement seen of the aeration of the lung fields in the interval. Lung volumes remain low.  D:  02/01/2021 E:  02/01/2021  This report was finalized on 2/1/2021 4:38 PM by Dr. Jackelin Pacheco MD.      Xr Chest 1 View    Result Date: 1/31/2021  Stable support hardware. Mildly increased airspace opacities which may be related to infection or edema.  This report was finalized on 1/31/2021 7:20 AM by Vipin Escobedo.      Xr Chest 1 View    Result Date: 1/30/2021  1. Low lung volumes with crowded pulmonary markings and left basal atelectasis.  This report was finalized on 1/30/2021 8:08 AM by Vipin Escobedo.      Xr Chest 1 View    Result Date: 1/29/2021  No new chest disease.  D:  01/29/2021 E:  01/29/2021    This report was finalized on 1/29/2021 10:02 PM by Dr. Narendra Colon MD.      Xr Chest 1 View    Result Date: 1/28/2021  Low lung volumes and patchy interstitial changes as described. No significant overall change from yesterday's study.   D:  01/28/2021 E:  01/28/2021  This report was finalized on 1/28/2021 10:03 PM by Dr. Narendra Colon MD.      Xr Chest 1 View    Result Date: 1/27/2021  Increased markings identified in the left lung base. Degenerative changes seen within the spine. Lines and tubes in satisfactory position.  D:  01/25/2021 E:  01/25/2021  This report was finalized on 1/27/2021 10:28 AM by Dr. Jackelin Pacheco MD.      Xr Chest 1 View    Result Date: 1/27/2021  Support hardware projects unchanged. Persistent low lung volumes with improved aeration of the left mid lung field and left lung base, likely diminished atelectasis and decreased effusion. No distinct pneumothorax. Unchanged heart and mediastinal contours.  This report was finalized on 1/27/2021 8:46 AM by Shakir Hamm.      Xr Chest 1 View    Result Date: 1/26/2021  Perihilar predominant pulmonary opacifications left greater than right with increase from prior comparison may represent components of vascular crowding given decreased lung  volumes from prior along with increasing small left pleural effusion in the interim.  D:  01/26/2021 E:  01/26/2021  This report was finalized on 1/26/2021 9:28 AM by Dr. Apollo Ceron.      Xr Chest 1 View    Result Date: 1/24/2021   1. The ET tube in good position at the level of the clavicles.  2. NG tube in the stomach. PICC line in the distal SVC.  3. Improved aeration of the left lung base compared to prior study, and stable mild diffuse interstitial disease elsewhere.  DICTATED:   01/24/2021 EDITED/ls :   01/24/2021    This report was finalized on 1/24/2021 11:10 PM by Dr. Narendra Colon MD.      Xr Chest 1 View    Result Date: 1/24/2021  ET tube about 5 cm above the emil. Right arm approach PICC is in the SVC. Exam is otherwise not significantly changed. Signer Name: Jesus Guevara MD  Signed: 1/24/2021 2:10 AM  Workstation Name: [facility]    Xr Chest 1 View    Result Date: 1/23/2021  1. Low lung volumes and mild but new bibasilar discoid atelectasis 2. NG tube below the left hemidiaphragm. 3. Cardiomegaly without evidence of overt congestive failure.  DICTATED:   01/23/2021 EDITED/ls :   01/23/2021  This report was finalized on 1/23/2021 10:31 PM by Dr. Narendra Colon MD.      Xr Chest 1 View    Result Date: 1/21/2021  . Low lung volumes accentuates cardiac silhouette and pulmonary markings. Bibasilar atelectasis. The opacity at the right base appears improved compared to the prior exam. I do question if there is subtle airspace disease scattered right chest however Signer Name: PRESLEY MADRIGAL MD  Signed: 1/21/2021 10:51 PM  Workstation Name: [facility]    Xr Chest 1 View    Result Date: 1/21/2021  Unchanged low lung volumes with some scattered areas of basilar atelectasis. No new focal lobar consolidation. No significant pleural effusion or distinct pneumothorax. Unchanged heart and mediastinal contours.  This report was finalized on  1/21/2021 1:38 PM by Shakir Hamm.      Xr Chest 1 View    Result Date: 1/21/2021  1. Cardiomegaly and shallow lung expansion. No significant interval change. Signer Name: Chuy Aleman MD  Signed: 1/21/2021 12:24 AM  Workstation Name: [facility]  [facility]    Xr Chest 1 View    Result Date: 1/20/2021  1. Cardiomegaly and bronchovascular crowding. Signer Name: Chuy Aleman MD  Signed: 1/20/2021 5:29 AM  Workstation Name: [facility]  [facility]    Xr Abdomen Kub    Result Date: 2/10/2021  Feeding tube is in good position. Bowel gas pattern is nonspecific Signer Name: Ken Ronquillo MD  Signed: 2/10/2021 6:43 AM  Workstation Name: [facility]  [facility]    Xr Abdomen Kub    Result Date: 2/9/2021  Nasogastric tube and Keofeed tube in position as described. Signer Name: Dax Valle MD  Signed: 2/9/2021 6:40 AM  Workstation Name: [facility]  [facility]    Xr Abdomen Kub    Result Date: 2/9/2021  Nasogastric tube and Keofeed tube in position as described. Signer Name: Dax Valle MD  Signed: 2/9/2021 6:40 AM  Workstation Name: [facility]  [facility]    Xr Abdomen Kub    Result Date: 2/5/2021  1. NG tube in the mid stomach. 2. Feeding tube in the proximal stomach.   D:  02/05/2021 E:  02/05/2021  This report was finalized on 2/5/2021 10:55 PM by Dr. Narendra Colon MD.      Xr Abdomen Kub    Result Date: 2/5/2021  Feeding tube has passed to the mid duodenum.  D:  02/05/2021 E:  02/05/2021     This report was finalized on 2/5/2021 10:55 PM by Dr. Narendra Colon MD.      Xr Abdomen Kub    Result Date: 2/3/2021  An enteric tube terminates in the distal duodenum. Evaluation is partially limited by patient body habitus, again demonstrating diffuse segments of gas-filled and dilated small and large bowel compatible with obstruction or ileus, at most minimally improved from recent comparison.  This  report was finalized on 2/3/2021 5:25 PM by Shakir Hamm.      Xr Abdomen Kub    Result Date: 1/30/2021  Feeding tube and nasogastric tube are appropriately positioned.  This report was finalized on 1/30/2021 9:12 AM by Vipin Escobedo.      Xr Abdomen Kub    Result Date: 1/27/2021  Feeding tube in the distal duodenum.  This report was finalized on 1/27/2021 2:58 PM by Shakir Hamm.      Xr Abdomen Kub    Result Date: 1/22/2021  Nasogastric tube identified tip in the stomach. Bowel gas pattern reveals mildly dilated loop of small bowel seen in the upper abdomen. Findings are stable.  D:  01/22/2021 E:  01/22/2021  This report was finalized on 1/22/2021 5:18 PM by Dr. Jackelin Pacheco MD.      Xr Abdomen Kub    Result Date: 1/21/2021  Nonspecific gaseous distention of small and large bowel. Pattern is overall nonobstructive as there is some air seen in the left lower quadrant large bowel. However, a partial obstruction cannot be excluded. Signer Name: PRESLEY MADRIGAL MD  Signed: 1/21/2021 10:50 PM  Workstation Name: [facility]  [facility]    Xr Abdomen Kub    Result Date: 1/20/2021  1. Nonspecific but nonobstructive bowel gas pattern. Signer Name: Chuy Aleman MD  Signed: 1/20/2021 11:22 PM  Workstation Name: [facility]  [facility]      History of Present Illness:  Titus Bro is a 46 y.o. male with PMHx of COPD, GERD, HTN, seizure secondary to alcohol withdrawal, and alcohol abuse (drinks a fifth of liquor daily), presents to the ED with CC of LUQ pain and nausea. Pt states his pain is similar to when he has had pancreatitis in the past. He denies diarrhea. No fever or chills. Pt given IV pain medication, anti-emetics, and IVF in the ED. Hospital medicine asked to admit.    Hospital Course:  Mr. Bro is a 45yo M with a history of ETOH abuse who was admitted on 1/19/21 with pancreatitis. He was admitted to Hospital Medicine but developed ETOH withdrawal  "necessitating transfer to the ICU on 1/22/21. He was placed on a Precedex drip and given IV Valium. He continued to require intermittent doses of Ativan despite this. He ultimately required intubation with mechanical ventilation on 1/24/21. A repeat CT of the abdomen/pelvis was performed on 1/27/21 due to worsening fevers and showed some possible necrosis in the head of the pancreas and worsening peripancreatic fluid collections. GI is following. He was extubated on 1/31/21. Precedex was finally stopped on 2/6/21 but had to be restarted on 2/7/21 due to agitation. Neurology was consulted and changed up his sedation regimen. He is now off Precedex and more alert. He passed his FEES on 2/10. He attempted to leave AMA on 2/10 but agreed to stay when his family would not take him home.     On the morning of 2/11/21, he was deemed acceptable for discharge. A walker was arranged per PT recommendations. He was seen by Chemical Dependency and given resources for continued abstinence from alcohol. He was agreeable to going home with his daughter who will ensure that he will not drink and therefore, he was sent with a Valium taper. He is still having some difficulty with bowel movements but is insistent this will improve when he is home. He will need a repeat CT scan of the abdomen/pelvis in 8 weeks to follow up his pancreatitis.     Vitals:  /83   Pulse 86   Temp 98.2 °F (36.8 °C) (Oral)   Resp 20   Ht 188 cm (74.02\")   Wt (!) 201 kg (442 lb 8 oz)   SpO2 93%   BMI 56.79 kg/m²     Physical Exam:  Telemetry:  Normal sinus rhythm.    Constitutional:  No acute distress.  Sitting up in a chair.    Eyes: No scleral icterus.   PERRL, EOM intact.    Neck:  Supple, FROM   Cardiovascular: Normal rate, regular and rhythm. Normal heart sounds.  No murmurs, gallop or rub.   Respiratory: No respiratory distress. Normal respiratory effort.  Clear to auscultation bilaterally.    Abdominal:  Soft. No masses. Nontender. No " distension. No HSM.   Extremities: No digital cyanosis. No clubbing.  1+ peripheral edema.   Skin: No rashes, lesions or ulcers   Neurological:             Alert and oriented x 3.         Labs:  Results from last 7 days   Lab Units 02/11/21  1138   WBC 10*3/mm3 7.20   HEMOGLOBIN g/dL 11.4*   HEMATOCRIT % 36.7*   PLATELETS 10*3/mm3 612*     Results from last 7 days   Lab Units 02/10/21  0601  02/05/21  0424   SODIUM mmol/L 134*   < > 143   POTASSIUM mmol/L 4.6   < > 3.9   CHLORIDE mmol/L 98   < > 103   CO2 mmol/L 23.0   < > 32.0*   BUN mg/dL 15   < > 14   CREATININE mg/dL 0.64*   < > 0.53*   CALCIUM mg/dL 9.4   < > 9.0   BILIRUBIN mg/dL  --   --  0.2   ALK PHOS U/L  --   --  62   ALT (SGPT) U/L  --   --  16   AST (SGOT) U/L  --   --  22   GLUCOSE mg/dL 95   < > 126*    < > = values in this interval not displayed.         Magnesium   Date Value Ref Range Status   02/10/2021 2.0 1.6 - 2.6 mg/dL Final   02/09/2021 1.9 1.6 - 2.6 mg/dL Final     Phosphorus   Date Value Ref Range Status   02/10/2021 4.0 2.5 - 4.5 mg/dL Final   02/09/2021 3.5 2.5 - 4.5 mg/dL Final           Discharge Medications:     Discharge Medications      Changes to Medications      Instructions Start Date   celecoxib 400 MG capsule  Commonly known as: CeleBREX  What changed: when to take this   400 mg, Oral, Daily         Continue These Medications      Instructions Start Date   amantadine 100 MG capsule  Commonly known as: SYMMETREL   100 mg, Oral, 2 Times Daily      cloNIDine 0.1 MG tablet  Commonly known as: CATAPRES   0.1 mg, Oral, 2 Times Daily      dicyclomine 20 MG tablet  Commonly known as: BENTYL   20 mg, Oral, Every 8 Hours PRN      escitalopram 20 MG tablet  Commonly known as: LEXAPRO   20 mg, Oral, Daily      folic acid 1 MG tablet  Commonly known as: FOLVITE   1 mg, Oral, Daily      hydroCHLOROthiazide 25 MG tablet  Commonly known as: HYDRODIURIL   25 mg, Oral, Daily      hydrOXYzine pamoate 50 MG capsule  Commonly known as: VISTARIL   50  mg, Oral, As Needed      meloxicam 7.5 MG tablet  Commonly known as: MOBIC   7.5 mg, Oral, 2 Times Daily      methocarbamol 750 MG tablet  Commonly known as: ROBAXIN   750 mg, Oral, 2 Times Daily      metoprolol tartrate 25 MG tablet  Commonly known as: LOPRESSOR   25 mg, Oral, 2 Times Daily      omeprazole 40 MG capsule  Commonly known as: priLOSEC   40 mg, Oral, Daily      ondansetron 4 MG tablet  Commonly known as: ZOFRAN   4 mg, Oral, Every 8 Hours PRN      vitamin B-12 1000 MCG tablet  Commonly known as: CYANOCOBALAMIN   1,000 mcg, Oral, Daily             Discharge Diet:   Diet Instructions     Diet: Regular, Specialty Diet; Thin Liquids, No Restrictions; Low Fat      Discharge Diet:  Regular  Specialty Diet       Fluid Consistency: Thin Liquids, No Restrictions    Specialty Diets: Low Fat          Activity at Discharge:    Activity Instructions     Activity as Tolerated            Follow-up Appointments  No future appointments.  Additional Instructions for the Follow-ups that You Need to Schedule     Ambulatory Referral to Home Health   As directed      Face to Face Visit Date: 2/11/2021    Follow-up provider for Plan of Care?: I treated the patient in an acute care facility and will not continue treatment after discharge.    Follow-up provider: FILI CABALLERO [[id number]]    Reason/Clinical Findings: Acute pancreatitis. Alcoholic +/- hyperlipidemia induced ICD-10-CM: K85.90    Describe mobility limitations that make leaving home difficult: Acute pancreatitis. Alcoholic +/- hyperlipidemia induced ICD-10-CM: K85.90    Nursing/Therapeutic Services Requested: Physical Therapy    PT orders: Strengthening Home safety assessment Gait Training Therapeutic exercise    Weight Bearing Status: As Tolerated    Frequency: 1 Week 1         Discharge Follow-up with PCP   As directed       Currently Documented PCP:    Fili Caballero MD    PCP Phone Number:    [phone]     Follow Up Details: 1 week                Discharge Instructions:  1. Follow up with pcp in 1 week.  2. Repeat CT scan of the abdomen/pelvis in 8 weeks  3. Outpatient resources an [facility] for ETOH cessation      Current Code Status     Date Active Code Status Order ID Comments User Context       1/19/2021 1408 CPR [id number]  Sandy Rae DO Inpatient     Advance Care Planning Activity      Questions for Current Code Status     Question Answer Comment    Code Status CPR     Medical Interventions (Level of Support Prior to Arrest) Full     Level Of Support Discussed With Patient            María Elena Deras DNP, EMIGDIO, WILLIAM  Pulmonary and Critical Care Medicine  02/11/21     I have personally seen, interviewed and examined the patient and verified all the key components of the history, physical examination, assessment and plan with María Elena Deras DNP, EMIGDIO, WILLIAM and reviewed the note, which reflects my changes and contributions.    Jeanie Page DO  Pulmonary and Critical Care Medicine    Time: Discharge 35 min    CC: Chema Greene MD    *Please note that portions of this note were completed with a voice recognition program. Efforts were made to edit the dictations, but occasionally words are mistranscribed.    Electronically signed by Jeanie Page DO at 02/11/21 1341       Discharge Order (From admission, onward)     Start     Ordered    02/11/21 1236  Discharge patient  Once     Expected Discharge Date: 02/11/21    Discharge Disposition: Home or Self Care    Physician of Record for Attribution - Please select from Treatment Team: JEANIE PAGE [[id number]]    Review needed by CMO to determine Physician of Record: No       Question Answer Comment   Physician of Record for Attribution - Please select from Treatment Team JEANIE PAGE    Review needed by CMO to determine Physician of Record No        02/11/21 1257

## 2021-02-11 NOTE — PROGRESS NOTES
Neurology       Patient Care Team:  Chema Greene MD as PCP - General (Internal Medicine)    Chief complaint: DTs    History: Patient is stable on 30 mg of Valium.    He is ready to sign out AMA and is being discharged instead.    His daughter is enthusiastic about taking him home with her but he declines at wants to go back to his previous roommate.      Past Medical History:   Diagnosis Date   • Anxiety    • Arthritis    • Asthma    • COPD (chronic obstructive pulmonary disease) (CMS/[facility])    • GERD (gastroesophageal reflux disease)    • H/O chest tube placement    • Hypertension    • Seizures (CMS/HCC)     WITHDRAWAL       Vital Signs   Vitals:    02/11/21 0900 02/11/21 1000 02/11/21 1100 02/11/21 1200   BP: 151/82 145/78 135/83    Pulse: 98 87 86    Resp: 22 18 20 20   Temp:    98.2 °F (36.8 °C)   TempSrc:    Oral   SpO2: 93% (!) 89% 93%    Weight:       Height:           Physical Exam:   General: Awake and alert              Neuro: Oriented to person place and time    Results Review:  Reviewed  Results from last 7 days   Lab Units 02/11/21  1138   WBC 10*3/mm3 7.20   HEMOGLOBIN g/dL 11.4*   HEMATOCRIT % 36.7*   PLATELETS 10*3/mm3 612*     Results from last 7 days   Lab Units 02/11/21  1138 02/10/21  0601 02/09/21  0906  02/05/21  0424   SODIUM mmol/L 134* 134* 138   < > 143   POTASSIUM mmol/L 4.2 4.6 4.5   < > 3.9   CHLORIDE mmol/L 96* 98 99   < > 103   CO2 mmol/L 26.0 23.0 32.0*   < > 32.0*   BUN mg/dL 11 15 17   < > 14   CREATININE mg/dL 0.74* 0.64* 0.58*   < > 0.53*   CALCIUM mg/dL 9.8 9.4 9.6   < > 9.0   BILIRUBIN mg/dL  --   --   --   --  0.2   ALK PHOS U/L  --   --   --   --  62   ALT (SGPT) U/L  --   --   --   --  16   AST (SGOT) U/L  --   --   --   --  22   GLUCOSE mg/dL 109* 95 131*   < > 126*    < > = values in this interval not displayed.       Imaging Results (Last 24 Hours)     ** No results found for the last 24 hours. **          Assessment:  DTs    Plan:  Discharge.    Discussed Valium with  Dr. Page who feels that the patient feels extremely likely to go home and start drinking again making that relatively contraindicated.    Comment:  Guarded prognosis discussed with the patient and his daughter         I discussed the patients findings and my recommendations with patient, family and primary care team    Ángel Rios MD  02/11/21  13:07 EST

## 2021-02-11 NOTE — PLAN OF CARE
Problem: Adult Inpatient Plan of Care  Goal: Plan of Care Review  Recent Flowsheet Documentation  Taken 2/11/2021 0937 by Yani Velasquez, PT  Progress: improving  Plan of Care Reviewed With: patient  Outcome Summary: Pt showing improvement as he ambulated without supp oxygen and requiring decreased assistance, ambulating 200ft wiht RWx and CGA, requiring multiple VC for safety awareness, posture, and obsticle navigation. Pt required min A to stand from toilet. Pt educated on home safety, pacing, HEP, and transfer/gait techniques to improve safety. Continue to progress as able. D/c rec remains for IRF, but if pt refuses, HWA and HH PT requried with RWx.

## 2021-02-11 NOTE — DISCHARGE INSTR - APPOINTMENTS
PLEASE ARRANGE A FOLLOW UP APPOINTMENT WITH YOUR PCP TO HAVE  A FOLLOW UP CAT SCAN OF ABDOMEN IN 8 WEEKS.

## 2021-02-11 NOTE — PROGRESS NOTES
Multidisciplinary Rounds    Time: 20min  Patient Name: Titus Bro  Date of Encounter: 02/11/21 09:18 EST  MRN: [id number]  Admission date: 1/19/2021      Reason for visit: MDR. RD to continue to follow per protocol.     Additional information obtained during MDR: EN d/c'd yesterday (2/10). Pt had some abdominal pain last night. RN reports that pt tolerated breakfast this AM.     Per I&O over the past 24 hrs:  2 bowel movements    Current diet: Diet Regular; Low Fat  Oral nutrition supplement: Boost Breeze 3x/day      Intervention:  Follow treatment plan  Care plan reviewed    Follow up:   Per protocol      Lynette Silver MS RD/[facility]  09:18 EST

## 2021-02-12 ENCOUNTER — READMISSION MANAGEMENT (OUTPATIENT)
Dept: CALL CENTER | Facility: HOSPITAL | Age: 46
End: 2021-02-12

## 2021-02-12 NOTE — OUTREACH NOTE
Prep Survey      Responses   [facility] patient discharged from?  [place]   Is LACE score < 7 ?  No   Emergency Room discharge w/ pulse ox?  No   Eligibility  Not Eligible   What are the reasons patient is not eligible?  Other   Does the patient have one of the following disease processes/diagnoses(primary or secondary)?  Other   Prep survey completed?  Yes          Abigail Wagner RN

## 2021-02-18 ENCOUNTER — TRANSCRIBE ORDERS (OUTPATIENT)
Dept: PHYSICAL THERAPY | Facility: CLINIC | Age: 46
End: 2021-02-18

## 2021-02-18 DIAGNOSIS — R53.1 WEAKNESS: Primary | ICD-10-CM

## 2021-02-19 ENCOUNTER — TREATMENT (OUTPATIENT)
Dept: PHYSICAL THERAPY | Facility: CLINIC | Age: 46
End: 2021-02-19

## 2021-02-19 DIAGNOSIS — Z74.09 DECREASED STRENGTH, ENDURANCE, AND MOBILITY: Primary | ICD-10-CM

## 2021-02-19 DIAGNOSIS — R53.1 DECREASED STRENGTH, ENDURANCE, AND MOBILITY: Primary | ICD-10-CM

## 2021-02-19 DIAGNOSIS — R68.89 DECREASED STRENGTH, ENDURANCE, AND MOBILITY: Primary | ICD-10-CM

## 2021-02-19 PROCEDURE — 97162 PT EVAL MOD COMPLEX 30 MIN: CPT | Performed by: PHYSICAL THERAPIST

## 2021-02-19 NOTE — PATIENT INSTRUCTIONS
Issued initial HEP including bridges, standing hip abd, marches, and mini squats to be perf 1/2x/day. Discussed w/ pt and his daughter.

## 2021-02-19 NOTE — PROGRESS NOTES
"  Physical Therapy Initial Evaluation and Plan of Care      Patient: Titus Bro   : 1975  Diagnosis/ICD-10 Code:  Decreased strength, endurance, and mobility [R53.1, Z74.09, R68.89]  Referring practitioner: Chema Greene MD  Date of Initial Visit: 2021  Today's Date: 2021  Patient seen for 1 sessions           Subjective Questionnaire: 30 second chair stand: 8 reps; LOFTON 44/56; TUG trial 1: 11.31\", trial 2: 10.05\" w/o AD    Subjective Evaluation    History of Present Illness  Mechanism of injury: Hospitalized w/ pancreatitis for 1 month. Adm 21. Developed ETOH withdrawal necessitating transfer to the ICU, intubated and on vent for approx 6 days. Discharged 21. Since discharge has been staying with his daughter, who initially assisted w/ ADLs/IADLs. At baseline ambulates independently, independent w/ all ADLs, IADLs, driving. Lives alone in a one level home. Has walk in shower. His daughter's home is 1 level, has a tub/shower with shower chair. Currently using RW for longer distances, ambulates household distances independently. Uses CPAP, otherwise on room air. Complains of arm and leg weakness, impaired balance w/ multiple falls (usually when trying to rise from a chair). Bathing, dressing on his own. Had one visit from [facility] but was recommended he begin OP services.    Subjective comment: gen weakness, impaired balance, poor endurance after hospitalization  Patient Occupation: unemployed, works on small engines Quality of life: fair    Pain  Current pain ratin  At best pain ratin  At worst pain ratin  Aggravating factors: repetitive movement, ambulation and standing  Progression: improved    Social Support  Lives in: one-story house  Lives with: alone    Diagnostic Tests  No diagnostic tests performed    Treatments  No previous or current treatments  Discharged from (in last 30 days): inpatient hospitalization  Patient Goals  Patient goals for therapy: improved balance, " increased strength, independence with ADLs/IADLs and return to sport/leisure activities           Treatment            Objective          Strength/Myotome Testing     Left Shoulder     Planes of Motion   Flexion: 4+   Abduction: 4+   External rotation at 0°: 4+   Internal rotation at 0°: 5     Right Shoulder     Planes of Motion   Flexion: 4+   Abduction: 4-   External rotation at 0°: 4+   Internal rotation at 0°: 5     Left Elbow   Flexion: 5  Extension: 5    Right Elbow   Flexion: 5  Extension: 5    Left Hip   Planes of Motion   Flexion: 4-  Extension: 3+  Abduction: 2+  External rotation: 4    Right Hip   Planes of Motion   Flexion: 4-  Extension: 3+  Abduction: 3  External rotation: 4    Left Knee   Flexion: 4+  Extension: 4+    Right Knee   Flexion: 5  Extension: 4+    Left Ankle/Foot   Dorsiflexion: 4+  Plantar flexion: 4-    Right Ankle/Foot   Dorsiflexion: 4+  Plantar flexion: 4-    Ambulation     Comments   Pt presents to PT ambulating w/ RW, accompanied by daughter. He is independent w/ transfers, bed mobility but takes increased to complete. He depends on use of hands for sit<>stand t/fs.          Assessment & Plan     Assessment  Impairments: activity intolerance, impaired balance, impaired physical strength, lacks appropriate home exercise program and safety issue  Assessment details: Pt is a 46 YOM who presents to PT w/ complaint of generalized weakness, decreased endurance, impaired balance w/ recent hx of multiple falls after prolonged hospitalization due to pancreatitis, ultimately requiring intubation. His deficits limit his ability to perform daily activities safely and independently.  He would benefit from skilled PT services to address his deficits and assist w/ return to PLOF.  Barriers to therapy: prexisting low back and B hip pn  Prognosis: good  Functional Limitations: carrying objects, lifting, walking, moving in bed, standing and unable to perform repetitive tasks  Goals  Plan Goals: STG 4  weeks  1) Pt to be compliant w/ initial HEP for strength and endurance  2) Pt to progress to independent ambulation over both household and community distances.  3) Pt to improve B hip abd strength to 3+/5 or greater.      LTG 8 weeks  1) Pt to be independent w/ long term HEP and self mgmt.  2) Pt to improve B hip abd/extn strength to 4/5 or better to assist w/ safe transfers and ambulation.  3) Pt to complete 10 reps or more w/ 30 sec chair stand test.  4) Pt to improve LOFTON score to 54/56 or better to reflect improved balance and safety w/ functional tasks.    Plan  Therapy options: will be seen for skilled physical therapy services  Planned modality interventions: cryotherapy, TENS and thermotherapy (hydrocollator packs)  Planned therapy interventions: abdominal trunk stabilization, ADL retraining, balance/weight-bearing training, body mechanics training, flexibility, functional ROM exercises, gait training, home exercise program, IADL retraining, joint mobilization, manual therapy, motor coordination training, neuromuscular re-education, postural training, soft tissue mobilization, spinal/joint mobilization, strengthening, stretching, therapeutic activities and transfer training  Frequency: 2x week  Duration in visits: 20  Treatment plan discussed with: patient and family  Plan details: PT POC to address noted deficits using TE/TA/NMR/MT/gait training, utilizing modalities as indicated for pn control.        Timed:  Manual Therapy:    0     mins  43257;  Therapeutic Exercise:    0     mins  74055;     Neuromuscular Teri:    0    mins  61295;    Therapeutic Activity:     0     mins  99665;     Gait Trainin     mins  28521;     Ultrasound:     0     mins  99038;    Electrical Stimulation:    0     mins  75720 ( );    Untimed:  Electrical Stimulation:    0     mins  92576 ( );  Mechanical Traction:    0     mins  76738;     Timed Treatment:   0   mins   Total Treatment:     45   mins    PT  SIGNATURE: Maya Hart, PT   DATE TREATMENT INITIATED: 2/19/2021    Initial Certification  Certification Period: 5/20/2021  I certify that the therapy services are furnished while this patient is under my care.  The services outlined above are required by this patient, and will be reviewed every 90 days.     PHYSICIAN: Chema Greene MD      DATE:     Please sign and return via fax to [phone]. Thank you, [facility].

## 2021-03-08 ENCOUNTER — DOCUMENTATION (OUTPATIENT)
Dept: PHYSICAL THERAPY | Facility: CLINIC | Age: 46
End: 2021-03-08

## 2021-03-08 NOTE — PROGRESS NOTES
Discharge Summary  Discharge Summary from Physical Therapy Report      Dates  PT visit: n/a  Number of Visits: eval only      Discharge Status of Patient: See MD Note dated n/a    Goals: Not Met    Discharge Plan: Patient to return to referring/providing physician    Comments Pt did not return for f/u therapy services.    Date of Discharge 3/8/2021        Carlyn Palma, PT  Physical Therapist

## 2023-04-10 NOTE — PROGRESS NOTES
Neurology       Patient Care Team:  Chema Greene MD as PCP - General (Internal Medicine)    Chief complaint: Alcohol withdrawal    History: The patient is a doing well.    He passed his fees and is switching to feeding today orally.    Has had no as needed Valium needed.    Is walking 150 feet with physical therapy.      Past Medical History:   Diagnosis Date   • Anxiety    • Arthritis    • Asthma    • COPD (chronic obstructive pulmonary disease) (CMS/[facility])    • GERD (gastroesophageal reflux disease)    • H/O chest tube placement    • Hypertension    • Seizures (CMS/HCC)     WITHDRAWAL       Vital Signs   Vitals:    02/10/21 1045 02/10/21 1100 02/10/21 1130 02/10/21 1133   BP:  128/70 130/100 130/100   BP Location:    Left arm   Patient Position:    Lying   Pulse: 84 81 91 81   Resp:    18   Temp:    97.4 °F (36.3 °C)   TempSrc:    Oral   SpO2: 93% 93% 90% 93%   Weight:       Height:           Physical Exam:   General: Awake and alert              Neuro: Oriented to person place and time.    Speech is articulate.    Moves all extremities well.        Results Review:  Reviewed  Results from last 7 days   Lab Units 02/09/21  0321   WBC 10*3/mm3 10.49   HEMOGLOBIN g/dL 10.8*   HEMATOCRIT % 36.8*   PLATELETS 10*3/mm3 720*     Results from last 7 days   Lab Units 02/10/21  0601 02/09/21  0906 02/08/21  0330  02/05/21  0424   SODIUM mmol/L 134* 138 138   < > 143   POTASSIUM mmol/L 4.6 4.5 3.9   < > 3.9   CHLORIDE mmol/L 98 99 99   < > 103   CO2 mmol/L 23.0 32.0* 30.0*   < > 32.0*   BUN mg/dL 15 17 14   < > 14   CREATININE mg/dL 0.64* 0.58* 0.55*   < > 0.53*   CALCIUM mg/dL 9.4 9.6 9.1   < > 9.0   BILIRUBIN mg/dL  --   --   --   --  0.2   ALK PHOS U/L  --   --   --   --  62   ALT (SGPT) U/L  --   --   --   --  16   AST (SGOT) U/L  --   --   --   --  22   GLUCOSE mg/dL 95 131* 122*   < > 126*    < > = values in this interval not displayed.       Imaging Results (Last 24 Hours)     Procedure Component Value Units  Date/Time    SLP FEES - Fiberoptic Endo Eval Swallow [[id number]] Resulted: 02/10/21 1038     Updated: 02/10/21 1038    Narrative:      This procedure was auto-finalized with no dictation required.    XR Abdomen KUB [[id number]] Collected: 02/10/21 0643     Updated: 02/10/21 0646    Narrative:      CR Abdomen 1 Vw    INDICATION:   Abdominal pain and emesis today    COMPARISON:   2/9/2021    FINDINGS:  AP radiographs of the abdomen. The feeding tube tip appears to be in the second portion of duodenum. The bowel gas pattern is nonspecific without evidence of obstruction. Bones are unremarkable        Impression:      Feeding tube is in good position. Bowel gas pattern is nonspecific    Signer Name: Ken Ronquillo MD   Signed: 2/10/2021 6:43 AM   Workstation Name: [email or web address]    [facility]          Assessment:  Alcohol withdrawal, improved    Plan:  Discontinue IV Valium.    Valium 10 mg p.o. every 8 hours.    Valium 5 mg p.o. every 6 hours as needed    Comment:  Doing well.    With the patient is able to ambulate 250 feet I would likely be able to proceed with discharge with outpatient therapy if physical therapy agrees.         I discussed the patients findings and my recommendations with patient, nursing staff and primary care team    Ángel Rios MD  02/10/21  12:02 EST         normal/well-groomed/no distress